# Patient Record
Sex: MALE | Race: WHITE | NOT HISPANIC OR LATINO | Employment: OTHER | URBAN - METROPOLITAN AREA
[De-identification: names, ages, dates, MRNs, and addresses within clinical notes are randomized per-mention and may not be internally consistent; named-entity substitution may affect disease eponyms.]

---

## 2017-06-28 ENCOUNTER — TELEPHONE (OUTPATIENT)
Dept: ONCOLOGY | Facility: CLINIC | Age: 72
End: 2017-06-28

## 2017-06-28 NOTE — TELEPHONE ENCOUNTER
Spoke with  Cruz to confirm loss of appetite.  Per Dr. Berman, patient is scheduled for yearly visit and since this is a new symptom since the last visit 12/2016.  Patient will need to be seen and assessed.  Okay to see him or Priscila.  Patient transferred to get appointment scheduled.

## 2017-06-28 NOTE — TELEPHONE ENCOUNTER
----- Message from Ramona Aguiar MA sent at 6/28/2017  9:47 AM EDT -----  Regarding: Cullen- lack of appetite, megace  Contact: [phone]  Pt wife called and states pt is doing well aside from his lack of appetite. She is wondering if Dr Berman could call in some Megace or something to give Francesco an appetite?  Family uses [facility]

## 2017-12-13 ENCOUNTER — APPOINTMENT (OUTPATIENT)
Dept: LAB | Facility: HOSPITAL | Age: 72
End: 2017-12-13

## 2017-12-13 ENCOUNTER — OFFICE VISIT (OUTPATIENT)
Dept: ONCOLOGY | Facility: CLINIC | Age: 72
End: 2017-12-13

## 2017-12-13 VITALS
TEMPERATURE: 98 F | SYSTOLIC BLOOD PRESSURE: 164 MMHG | HEART RATE: 90 BPM | DIASTOLIC BLOOD PRESSURE: 88 MMHG | WEIGHT: 154 LBS | HEIGHT: 67 IN | RESPIRATION RATE: 18 BRPM | BODY MASS INDEX: 24.17 KG/M2

## 2017-12-13 DIAGNOSIS — C15.5 MALIGNANT NEOPLASM OF LOWER THIRD OF ESOPHAGUS (HCC): Primary | ICD-10-CM

## 2017-12-13 LAB
ALBUMIN SERPL-MCNC: 4.2 G/DL (ref 3.2–4.8)
ALBUMIN/GLOB SERPL: 1.4 G/DL (ref 1.5–2.5)
ALP SERPL-CCNC: 64 U/L (ref 25–100)
ALT SERPL W P-5'-P-CCNC: 14 U/L (ref 7–40)
ANION GAP SERPL CALCULATED.3IONS-SCNC: 5 MMOL/L (ref 3–11)
AST SERPL-CCNC: 15 U/L (ref 0–33)
BILIRUB SERPL-MCNC: 0.5 MG/DL (ref 0.3–1.2)
BUN BLD-MCNC: 12 MG/DL (ref 9–23)
BUN/CREAT SERPL: 12 (ref 7–25)
CALCIUM SPEC-SCNC: 9.2 MG/DL (ref 8.7–10.4)
CHLORIDE SERPL-SCNC: 102 MMOL/L (ref 99–109)
CO2 SERPL-SCNC: 29 MMOL/L (ref 20–31)
CREAT BLD-MCNC: 1 MG/DL (ref 0.6–1.3)
ERYTHROCYTE [DISTWIDTH] IN BLOOD BY AUTOMATED COUNT: 14.8 % (ref 11.3–14.5)
GFR SERPL CREATININE-BSD FRML MDRD: 73 ML/MIN/1.73
GLOBULIN UR ELPH-MCNC: 3.1 GM/DL
GLUCOSE BLD-MCNC: 168 MG/DL (ref 70–100)
HCT VFR BLD AUTO: 49.2 % (ref 38.9–50.9)
HGB BLD-MCNC: 16.5 G/DL (ref 13.1–17.5)
LYMPHOCYTES # BLD AUTO: 2 10*3/MM3 (ref 0.6–4.8)
LYMPHOCYTES NFR BLD AUTO: 21 % (ref 24–44)
MCH RBC QN AUTO: 29.9 PG (ref 27–31)
MCHC RBC AUTO-ENTMCNC: 33.5 G/DL (ref 32–36)
MCV RBC AUTO: 89.1 FL (ref 80–99)
MONOCYTES # BLD AUTO: 0.6 10*3/MM3 (ref 0–1)
MONOCYTES NFR BLD AUTO: 6.5 % (ref 0–12)
NEUTROPHILS # BLD AUTO: 7 10*3/MM3 (ref 1.5–8.3)
NEUTROPHILS NFR BLD AUTO: 72.5 % (ref 41–71)
PLATELET # BLD AUTO: 246 10*3/MM3 (ref 150–450)
PMV BLD AUTO: 7.2 FL (ref 6–12)
POTASSIUM BLD-SCNC: 4.7 MMOL/L (ref 3.5–5.5)
PROT SERPL-MCNC: 7.3 G/DL (ref 5.7–8.2)
RBC # BLD AUTO: 5.52 10*6/MM3 (ref 4.2–5.76)
SODIUM BLD-SCNC: 136 MMOL/L (ref 132–146)
WBC NRBC COR # BLD: 9.7 10*3/MM3 (ref 3.5–10.8)

## 2017-12-13 PROCEDURE — 99214 OFFICE O/P EST MOD 30 MIN: CPT | Performed by: NURSE PRACTITIONER

## 2017-12-13 PROCEDURE — 36415 COLL VENOUS BLD VENIPUNCTURE: CPT | Performed by: NURSE PRACTITIONER

## 2017-12-13 PROCEDURE — 80053 COMPREHEN METABOLIC PANEL: CPT | Performed by: NURSE PRACTITIONER

## 2017-12-13 PROCEDURE — 85025 COMPLETE CBC W/AUTO DIFF WBC: CPT | Performed by: NURSE PRACTITIONER

## 2017-12-13 RX ORDER — ALBUTEROL SULFATE 2.5 MG/3ML
SOLUTION RESPIRATORY (INHALATION)
Refills: 0 | COMMUNITY
Start: 2017-11-29

## 2017-12-13 RX ORDER — HYDROCODONE BITARTRATE AND ACETAMINOPHEN 5; 325 MG/1; MG/1
TABLET ORAL
Refills: 0 | COMMUNITY
Start: 2017-11-29 | End: 2019-04-30

## 2017-12-13 NOTE — PROGRESS NOTES
"      PROBLEM LIST:  1. Adenocarcinoma of the distal esophagus.   a) Clinical stage T3N1M0, before neoadjuvant therapy.   b) Neoadjuvant chemoradiation.   c) Resection with stage [id number], stage [place]ic, resection 03/31/2008.       CHIEF COMPLAINT: Follow-up about esophageal cancer     Subjective     HISTORY OF PRESENT ILLNESS:   Mr. Arroyo is here for follow up evaluation of esophageal cancer. He complains of increasing mid epigastric abdominal pain over the past few months. He takes Nexium daily and Mylanta prn with minimal relief in symptoms.  He denies any unintentional weight loss, difficulty or painful swallowing. He maintains his usual daily activities.     Past Medical History, Past Surgical History, Social History, Family History have been reviewed and are without significant changes except as mentioned.    Review of Systems   A comprehensive 14 point review of systems was performed and was negative except as mentioned.    Medications:  The current medication list was reviewed in the EMR    ALLERGIES:  No Known Allergies    Objective      /88  Pulse 90  Temp 98 °F (36.7 °C)  Resp 18  Ht 170.2 cm (67\")  Wt 69.9 kg (154 lb)  BMI 24.12 kg/m2         General: well appearing, in no acute distress   HEENT: sclera anicteric, oropharynx clear  Lymphatics: no cervical, supraclavicular, or axillary adenopathy  Cardiovascular: regular rate and rhythm, no murmurs  Lungs: clear to auscultation bilaterally  Abdomen: soft, nontender, nondistended.  No palpable masses or organomegaly  Extremeties: no lower extremity edema, cords or calf tenderness  Skin: no rashes, lesions, bruising, or petechiae    RECENT LABS:  Hematology WBC   Date Value Ref Range Status   12/14/2015 8.69 3.50 - 10.80 K/mcL Final     Hemoglobin   Date Value Ref Range Status   12/14/2015 16.3 13.1 - 17.5 g/dL Final     Hematocrit   Date Value Ref Range Status   12/14/2015 48.8 38.9 - 50.9 % Final     MCV   Date Value Ref Range Status   12/14/2015 " 89.9 80.0 - 99.0 fL Final     Platelets   Date Value Ref Range Status   12/14/2015 216 150 - 450 K/mcL Final     Neutrophils Absolute   Date Value Ref Range Status   12/14/2015 5.81 1.50 - 8.30 K/mcL Final     Lymphocytes Absolute   Date Value Ref Range Status   12/14/2015 2.02 0.60 - 4.80 K/mcL Final     Monocytes Absolute   Date Value Ref Range Status   12/14/2015 0.66 0.00 - 1.00 K/mcL Final     Eosinophils Absolute   Date Value Ref Range Status   12/14/2015 0.13 0.10 - 0.30 K/mcL Final     Basophils Absolute   Date Value Ref Range Status   12/14/2015 0.05 0.00 - 0.20 K/mcL Final       Glucose   Date Value Ref Range Status   12/14/2015 150 (H) 70 - 100 mg/dL Final     Sodium   Date Value Ref Range Status   12/14/2015 141 132 - 146 mmol/L Final     Potassium   Date Value Ref Range Status   12/14/2015 4.6 3.5 - 5.5 mmol/L Final     CO2   Date Value Ref Range Status   12/14/2015 31 20 - 31 mmol/L Final     Chloride   Date Value Ref Range Status   12/14/2015 106 99 - 109 mmol/L Final     Anion Gap   Date Value Ref Range Status   12/14/2015 4 3 - 11 mmol/L Final     Creatinine   Date Value Ref Range Status   12/14/2015 1.0 0.6 - 1.3 mg/dL Final     BUN   Date Value Ref Range Status   12/14/2015 9 9 - 23 mg/dL Final     Calcium   Date Value Ref Range Status   12/14/2015 9.5 8.7 - 10.4 mg/dL Final     Alkaline Phosphatase   Date Value Ref Range Status   12/14/2015 93 25 - 100 Units/L Final     Total Protein   Date Value Ref Range Status   12/14/2015 7.0 5.7 - 8.2 g/dL Final     ALT (SGPT)   Date Value Ref Range Status   12/14/2015 17 7 - 40 Units/L Final     AST (SGOT)   Date Value Ref Range Status   12/14/2015 20 0 - 33 Units/L Final     Total Bilirubin   Date Value Ref Range Status   12/14/2015 0.5 0.3 - 1.2 mg/dL Final     Albumin   Date Value Ref Range Status   12/14/2015 4.1 3.2 - 4.8 g/dL Final       No results found for: LDH, URICACID       Assessment/Plan   IMPRESSION:   1. Esophageal cancer. He had been doing  well following the chemoradiation and resection. He recently complains of increasing abdominal pain after eating. We will obtain a ct scan of the abdomen to evaluate. If that is normal then we will look at repeat EGD.       PLAN:   1. CBC and CMP today. I will contact him with any adverse findings.  2. CT scan of abdomen with contrast within days.   3. We will see him back in 3months  for follow-up evaluation. He has been instructed to contact us in the interm if any new symptoms arise.                  NIMCO Rowell  [facility] Hematology and Oncology    12/13/2017          CC:

## 2017-12-17 ENCOUNTER — HOSPITAL ENCOUNTER (OUTPATIENT)
Dept: CT IMAGING | Facility: HOSPITAL | Age: 72
Discharge: HOME | End: 2017-12-17
Admitting: NURSE PRACTITIONER

## 2017-12-17 DIAGNOSIS — C15.5 MALIGNANT NEOPLASM OF LOWER THIRD OF ESOPHAGUS (HCC): ICD-10-CM

## 2017-12-17 PROCEDURE — 74160 CT ABDOMEN W/CONTRAST: CPT

## 2017-12-17 PROCEDURE — 0 IOPAMIDOL 61 % SOLUTION: Performed by: NURSE PRACTITIONER

## 2017-12-17 RX ADMIN — IOPAMIDOL 85 ML: 612 INJECTION, SOLUTION INTRAVENOUS at 10:30

## 2017-12-19 ENCOUNTER — TELEPHONE (OUTPATIENT)
Dept: ONCOLOGY | Facility: CLINIC | Age: 72
End: 2017-12-19

## 2017-12-19 NOTE — TELEPHONE ENCOUNTER
Called and left message on patient VM, informed him that the CT Scan results came back with no cancer and the only thing seen was his surgery.

## 2017-12-19 NOTE — TELEPHONE ENCOUNTER
----- Message from Eileen Ledezma sent at 12/19/2017 10:28 AM EST -----  Regarding: DON- SCAN RESULTS   Contact: [phone]  PATIENT IS WANTING RESULTS FROM CT SCAN HE HAD.

## 2018-03-19 ENCOUNTER — OFFICE VISIT (OUTPATIENT)
Dept: ONCOLOGY | Facility: CLINIC | Age: 73
End: 2018-03-19

## 2018-03-19 VITALS
TEMPERATURE: 97.6 F | HEIGHT: 67 IN | WEIGHT: 154 LBS | DIASTOLIC BLOOD PRESSURE: 96 MMHG | HEART RATE: 94 BPM | BODY MASS INDEX: 24.17 KG/M2 | RESPIRATION RATE: 18 BRPM | SYSTOLIC BLOOD PRESSURE: 140 MMHG

## 2018-03-19 DIAGNOSIS — C15.5 MALIGNANT NEOPLASM OF LOWER THIRD OF ESOPHAGUS (HCC): Primary | ICD-10-CM

## 2018-03-19 PROCEDURE — 99213 OFFICE O/P EST LOW 20 MIN: CPT | Performed by: NURSE PRACTITIONER

## 2018-03-19 NOTE — PROGRESS NOTES
"      PROBLEM LIST:  1. Adenocarcinoma of the distal esophagus.   a) Clinical stage T3N1M0, before neoadjuvant therapy.   b) Neoadjuvant chemoradiation.   c) Resection with stage [id number], stage Bengali, resection 03/31/2008.       CHIEF COMPLAINT: Follow-up about esophageal cancer     Subjective     HISTORY OF PRESENT ILLNESS:   Mr. Arroyo is here for follow up evaluation of esophageal cancer. He reports his mid epigastric abdominal pain has improved immensely since his last visit. He denies any unintentional weight loss, difficulty or painful swallowing. He maintains his usual daily activities. He complains of recent bronchitis with a productive cough but no fevers, chills or dyspnea. He completed an antibiotic approximately 2 weeks ago.     Past Medical History, Past Surgical History, Social History, Family History have been reviewed and are without significant changes except as mentioned.    Review of Systems   A comprehensive 14 point review of systems was performed and was negative except as mentioned.    Medications:  The current medication list was reviewed in the EMR    ALLERGIES:  No Known Allergies    Objective      /96   Pulse 94   Temp 97.6 °F (36.4 °C)   Resp 18   Ht 170.2 cm (67\")   Wt 69.9 kg (154 lb)   BMI 24.12 kg/m²          General: well appearing, in no acute distress   HEENT: sclera anicteric, oropharynx clear  Lymphatics: no cervical, supraclavicular, or axillary adenopathy  Cardiovascular: regular rate and rhythm, no murmurs  Lungs: scattered expiratory wheezes, no rales or rhonchi      Abdomen: soft, nontender, nondistended.  No palpable masses or organomegaly  Extremeties: no lower extremity edema, cords or calf tenderness  Skin: no rashes, lesions, bruising, or petechiae    RECENT LABS:  Hematology WBC   Date Value Ref Range Status   12/13/2017 9.70 3.50 - 10.80 10*3/mm3 Final     Hemoglobin   Date Value Ref Range Status   12/13/2017 16.5 13.1 - 17.5 g/dL Final     Hematocrit   Date " Value Ref Range Status   12/13/2017 49.2 38.9 - 50.9 % Final     MCV   Date Value Ref Range Status   12/13/2017 89.1 80.0 - 99.0 fL Final     RDW   Date Value Ref Range Status   12/13/2017 14.8 (H) 11.3 - 14.5 % Final     MPV   Date Value Ref Range Status   12/13/2017 7.2 6.0 - 12.0 fL Final     Platelets   Date Value Ref Range Status   12/13/2017 246 150 - 450 10*3/mm3 Final     Neutrophils, Absolute   Date Value Ref Range Status   12/13/2017 7.00 1.50 - 8.30 10*3/mm3 Final     Lymphocytes, Absolute   Date Value Ref Range Status   12/13/2017 2.00 0.60 - 4.80 10*3/mm3 Final     Monocytes, Absolute   Date Value Ref Range Status   12/13/2017 0.60 0.00 - 1.00 10*3/mm3 Final     Eosinophils Absolute   Date Value Ref Range Status   12/14/2015 0.13 0.10 - 0.30 K/mcL Final     Basophils Absolute   Date Value Ref Range Status   12/14/2015 0.05 0.00 - 0.20 K/mcL Final       Glucose   Date Value Ref Range Status   12/13/2017 168 (H) 70 - 100 mg/dL Final     Sodium   Date Value Ref Range Status   12/13/2017 136 132 - 146 mmol/L Final     Potassium   Date Value Ref Range Status   12/13/2017 4.7 3.5 - 5.5 mmol/L Final     CO2   Date Value Ref Range Status   12/13/2017 29.0 20.0 - 31.0 mmol/L Final     Chloride   Date Value Ref Range Status   12/13/2017 102 99 - 109 mmol/L Final     Anion Gap   Date Value Ref Range Status   12/13/2017 5.0 3.0 - 11.0 mmol/L Final     Creatinine   Date Value Ref Range Status   12/13/2017 1.00 0.60 - 1.30 mg/dL Final     BUN   Date Value Ref Range Status   12/13/2017 12 9 - 23 mg/dL Final     BUN/Creatinine Ratio   Date Value Ref Range Status   12/13/2017 12.0 7.0 - 25.0 Final     Calcium   Date Value Ref Range Status   12/13/2017 9.2 8.7 - 10.4 mg/dL Final     eGFR Non  Amer   Date Value Ref Range Status   12/13/2017 73 >60 mL/min/1.73 Final     Alkaline Phosphatase   Date Value Ref Range Status   12/13/2017 64 25 - 100 U/L Final     Total Protein   Date Value Ref Range Status   12/13/2017  "7.3 5.7 - 8.2 g/dL Final     ALT (SGPT)   Date Value Ref Range Status   12/13/2017 14 7 - 40 U/L Final     AST (SGOT)   Date Value Ref Range Status   12/13/2017 15 0 - 33 U/L Final     Total Bilirubin   Date Value Ref Range Status   12/13/2017 0.5 0.3 - 1.2 mg/dL Final     Albumin   Date Value Ref Range Status   12/13/2017 4.20 3.20 - 4.80 g/dL Final     Globulin   Date Value Ref Range Status   12/13/2017 3.1 gm/dL Final     A/G Ratio   Date Value Ref Range Status   12/13/2017 1.4 (L) 1.5 - 2.5 g/dL Final       No results found for: LDH, URICACID     CT Abdomen With Contrast [[id number]] Collected: 12/17/17 1337   Order Status: Completed Updated: 12/17/17 1341   Narrative:     EXAMINATION: CT ABDOMEN W CONTRAST - 12/17/2017     INDICATION:  C15.5-Malignant neoplasm of lower third of esophagus.  Abdominal pain. Follow-up exam.     TECHNIQUE: CT scan of the abdomen was performed with intravenous  contrast.     The radiation dose reduction device was turned on for each scan per the  ALARA (As Low as Reasonably Achievable) protocol.     COMPARISON: 11/29/2012.     FINDINGS: The most superior images demonstrate postinflammatory changes  particularly in the right lower lobe. There are postoperative changes  related to esophagectomy and gastric \"pull-through\" procedure. The  spleen is normal. There is mild diffuse fatty infiltration of the liver  but there is no focal abnormality. There is no ascites, aneurysm or  retroperitoneal lymphadenopathy. There is a benign simple cyst in the  right kidney. The left kidney is ptotic.      Impression:     There are no acute findings and there has been no change  since 11/29/2012. There are postoperative changes related to esophageal  surgery and there is mild fatty infiltration of the liver without focal  abnormality.     DICTATED:     12/17/2017  EDITED:          12/17/2017        This report was finalized on 12/17/2017 1:41 PM by Dr. Ravindra Carcamo MD.            Assessment/Plan "   IMPRESSION:   1. Esophageal cancer. He had been doing well following the chemoradiation and resection.    2. Wheezing and cough. Encourage use of Mucinex BID and albuterol inhaler prn cough and wheezing.       PLAN:   1. We will see him back in 1 year for follow-up evaluation. He has been instructed to contact us in the interm if any new symptoms arise.                  Arabella Mejias, APRJACE  [facility] Hematology and Oncology    3/19/2018          CC:

## 2018-10-02 ENCOUNTER — TRANSCRIBE ORDERS (OUTPATIENT)
Dept: ADMINISTRATIVE | Facility: HOSPITAL | Age: 73
End: 2018-10-02

## 2018-10-02 DIAGNOSIS — R31.29 MICROSCOPIC HEMATURIA: Primary | ICD-10-CM

## 2018-10-04 ENCOUNTER — TRANSCRIBE ORDERS (OUTPATIENT)
Dept: ADMINISTRATIVE | Facility: HOSPITAL | Age: 73
End: 2018-10-04

## 2018-10-04 ENCOUNTER — HOSPITAL ENCOUNTER (OUTPATIENT)
Dept: ULTRASOUND IMAGING | Facility: HOSPITAL | Age: 73
Discharge: HOME | End: 2018-10-04
Admitting: UROLOGY

## 2018-10-04 ENCOUNTER — HOSPITAL ENCOUNTER (OUTPATIENT)
Dept: GENERAL RADIOLOGY | Facility: HOSPITAL | Age: 73
Discharge: HOME | End: 2018-10-04

## 2018-10-04 DIAGNOSIS — R31.29 MICROSCOPIC HEMATURIA: ICD-10-CM

## 2018-10-04 DIAGNOSIS — R31.29 MICROSCOPIC HEMATURIA: Primary | ICD-10-CM

## 2018-10-04 PROCEDURE — 76775 US EXAM ABDO BACK WALL LIM: CPT

## 2018-10-04 PROCEDURE — 74018 RADEX ABDOMEN 1 VIEW: CPT

## 2019-03-08 ENCOUNTER — OFFICE VISIT (OUTPATIENT)
Dept: ONCOLOGY | Facility: CLINIC | Age: 74
End: 2019-03-08

## 2019-03-08 VITALS
BODY MASS INDEX: 24.17 KG/M2 | HEART RATE: 106 BPM | RESPIRATION RATE: 18 BRPM | WEIGHT: 154 LBS | DIASTOLIC BLOOD PRESSURE: 87 MMHG | OXYGEN SATURATION: 97 % | TEMPERATURE: 97.6 F | SYSTOLIC BLOOD PRESSURE: 143 MMHG | HEIGHT: 67 IN

## 2019-03-08 DIAGNOSIS — C15.5 MALIGNANT NEOPLASM OF LOWER THIRD OF ESOPHAGUS (HCC): Primary | ICD-10-CM

## 2019-03-08 PROCEDURE — 99214 OFFICE O/P EST MOD 30 MIN: CPT | Performed by: INTERNAL MEDICINE

## 2019-03-08 NOTE — PROGRESS NOTES
"      PROBLEM LIST:  1. Adenocarcinoma of the distal esophagus.   a) Clinical stage T3N1M0, before neoadjuvant therapy.   b) Neoadjuvant chemoradiation.   c) Resection with stage [id number], stage [place], resection 03/31/2008.       CHIEF COMPLAINT: Follow-up about esophageal cancer     Subjective     HISTORY OF PRESENT ILLNESS:   Mr. Arroyo is here for follow up evaluation of esophageal cancer.  He is 10 years out from his diagnosis of esophageal cancer.  He is having issues with abdominal distention and diarrhea.  This is been an ongoing issue and is concerned.  He had a CAT scan over a year ago that was normal.  Denies any major weight loss.      Past Medical History, Past Surgical History, Social History, Family History have been reviewed and are without significant changes except as mentioned.    Review of Systems   Constitutional: Negative.    HENT: Negative.    Eyes: Negative.    Respiratory: Negative.    Cardiovascular: Negative.    Gastrointestinal: Positive for abdominal pain and diarrhea.   Endocrine: Negative.    Genitourinary: Negative.    Musculoskeletal: Negative.    Skin: Negative.    Allergic/Immunologic: Negative.    Neurological: Negative.    Hematological: Negative.    Psychiatric/Behavioral: Negative.       A comprehensive 14 point review of systems was performed and was negative except as mentioned.    Medications:  The current medication list was reviewed in the EMR    ALLERGIES:  No Known Allergies    Objective      /87 Comment: RUE  Pulse 106   Temp 97.6 °F (36.4 °C) (Temporal)   Resp 18   Ht 170.2 cm (67\")   Wt 69.9 kg (154 lb)   SpO2 97% Comment: RA  BMI 24.12 kg/m²          General: well appearing, in no acute distress   HEENT: sclera anicteric, oropharynx clear  Lymphatics: no cervical, supraclavicular, or axillary adenopathy  Cardiovascular: regular rate and rhythm, no murmurs  Lungs: scattered expiratory wheezes, no rales or rhonchi      Abdomen: soft, nontender, nondistended.  " No palpable masses or organomegaly  Extremeties: no lower extremity edema, cords or calf tenderness  Skin: no rashes, lesions, bruising, or petechiae      Assessment/Plan   IMPRESSION:     1.  Esophageal cancer.  His risk of recurrence at this point is fairly low.  I suspect the symptoms that he is having is likely related to his esophagectomy.  I recommended he increase his fiber intake with Metamucil to see if that helps with the diarrhea issues.  He is having dumping syndrome.  Metamucil can slow the transition time.  If this does not improve then we can get a CAT scan to see if there is any cause.  He may require an EGD to make sure we are not missing an obvious sequela of his surgery.  I reviewed his CAT scan from December 2017.  I do not see any obvious cause of his issues.  The other possibility is that he has long-standing diabetes and he may have chronic diarrhea related to that.    I spent a total of 25 minutes in direct patient care, greater than 20 minutes (greater than 50%) were spent in coordination of care, and counseling the patient regarding esophageal cancer. Answered any questions patient had with medication and plan.      Greg Sykes MD  [facility] Hematology and Oncology    3/8/2019          CC:

## 2019-04-30 ENCOUNTER — APPOINTMENT (OUTPATIENT)
Dept: GENERAL RADIOLOGY | Facility: HOSPITAL | Age: 74
End: 2019-04-30

## 2019-04-30 ENCOUNTER — HOSPITAL ENCOUNTER (INPATIENT)
Facility: HOSPITAL | Age: 74
LOS: 9 days | Discharge: HOME | End: 2019-05-09
Attending: EMERGENCY MEDICINE | Admitting: THORACIC SURGERY (CARDIOTHORACIC VASCULAR SURGERY)

## 2019-04-30 DIAGNOSIS — R60.0 PEDAL EDEMA: ICD-10-CM

## 2019-04-30 DIAGNOSIS — Z74.09 IMPAIRED FUNCTIONAL MOBILITY, BALANCE, GAIT, AND ENDURANCE: ICD-10-CM

## 2019-04-30 DIAGNOSIS — I48.91 ATRIAL FIBRILLATION WITH RAPID VENTRICULAR RESPONSE (HCC): ICD-10-CM

## 2019-04-30 DIAGNOSIS — J44.9 CHRONIC OBSTRUCTIVE PULMONARY DISEASE, UNSPECIFIED COPD TYPE (HCC): ICD-10-CM

## 2019-04-30 DIAGNOSIS — I25.119 CORONARY ARTERY DISEASE INVOLVING NATIVE HEART WITH ANGINA PECTORIS, UNSPECIFIED VESSEL OR LESION TYPE (HCC): ICD-10-CM

## 2019-04-30 DIAGNOSIS — I50.23 ACUTE ON CHRONIC SYSTOLIC CHF (CONGESTIVE HEART FAILURE) (HCC): ICD-10-CM

## 2019-04-30 DIAGNOSIS — I51.9 LEFT VENTRICULAR DYSFUNCTION: ICD-10-CM

## 2019-04-30 DIAGNOSIS — I31.39 PERICARDIAL EFFUSION (NONINFLAMMATORY): Primary | ICD-10-CM

## 2019-04-30 DIAGNOSIS — I30.9 ACUTE PERICARDIAL EFFUSION: ICD-10-CM

## 2019-04-30 DIAGNOSIS — I48.91 ATRIAL FIBRILLATION WITH RVR (HCC): ICD-10-CM

## 2019-04-30 PROBLEM — R79.89 ELEVATED LACTIC ACID LEVEL: Status: ACTIVE | Noted: 2019-04-30

## 2019-04-30 PROBLEM — E87.1 HYPONATREMIA: Status: ACTIVE | Noted: 2019-04-30

## 2019-04-30 PROBLEM — E11.9 TYPE 2 DIABETES MELLITUS: Status: ACTIVE | Noted: 2019-04-30

## 2019-04-30 PROBLEM — J90 BILATERAL PLEURAL EFFUSION: Status: ACTIVE | Noted: 2019-04-30

## 2019-04-30 PROBLEM — K21.9 GERD WITHOUT ESOPHAGITIS: Status: ACTIVE | Noted: 2019-04-30

## 2019-04-30 PROBLEM — Z85.01 HISTORY OF ESOPHAGEAL CANCER: Status: ACTIVE | Noted: 2019-04-30

## 2019-04-30 PROBLEM — I50.9 ACUTE CHF (HCC): Status: ACTIVE | Noted: 2019-04-30

## 2019-04-30 LAB
ALBUMIN SERPL-MCNC: 3.6 G/DL (ref 3.5–5.2)
ALBUMIN/GLOB SERPL: 0.9 G/DL
ALP SERPL-CCNC: 98 U/L (ref 39–117)
ALT SERPL W P-5'-P-CCNC: 12 U/L (ref 1–41)
ANION GAP SERPL CALCULATED.3IONS-SCNC: 17 MMOL/L
APTT PPP: 30.6 SECONDS (ref 85–120)
AST SERPL-CCNC: 16 U/L (ref 1–40)
BASOPHILS # BLD AUTO: 0.03 10*3/MM3 (ref 0–0.2)
BASOPHILS # BLD AUTO: 0.04 10*3/MM3 (ref 0–0.2)
BASOPHILS NFR BLD AUTO: 0.3 % (ref 0–1.5)
BASOPHILS NFR BLD AUTO: 0.4 % (ref 0–1.5)
BILIRUB SERPL-MCNC: 0.6 MG/DL (ref 0.2–1.2)
BILIRUB UR QL STRIP: NEGATIVE
BUN BLD-MCNC: 13 MG/DL (ref 8–23)
BUN/CREAT SERPL: 15.3 (ref 7–25)
CALCIUM SPEC-SCNC: 9.3 MG/DL (ref 8.6–10.5)
CHLORIDE SERPL-SCNC: 89 MMOL/L (ref 98–107)
CLARITY UR: CLEAR
CO2 SERPL-SCNC: 27 MMOL/L (ref 22–29)
COLOR UR: YELLOW
CREAT BLD-MCNC: 0.85 MG/DL (ref 0.76–1.27)
D-LACTATE SERPL-SCNC: 1.6 MMOL/L (ref 0.5–2)
D-LACTATE SERPL-SCNC: 3.1 MMOL/L (ref 0.5–2)
DEPRECATED RDW RBC AUTO: 49.7 FL (ref 37–54)
DEPRECATED RDW RBC AUTO: 50.3 FL (ref 37–54)
EOSINOPHIL # BLD AUTO: 0.03 10*3/MM3 (ref 0–0.4)
EOSINOPHIL # BLD AUTO: 0.03 10*3/MM3 (ref 0–0.4)
EOSINOPHIL NFR BLD AUTO: 0.3 % (ref 0.3–6.2)
EOSINOPHIL NFR BLD AUTO: 0.3 % (ref 0.3–6.2)
ERYTHROCYTE [DISTWIDTH] IN BLOOD BY AUTOMATED COUNT: 16.9 % (ref 12.3–15.4)
ERYTHROCYTE [DISTWIDTH] IN BLOOD BY AUTOMATED COUNT: 17 % (ref 12.3–15.4)
GFR SERPL CREATININE-BSD FRML MDRD: 88 ML/MIN/1.73
GLOBULIN UR ELPH-MCNC: 4.1 GM/DL
GLUCOSE BLD-MCNC: 137 MG/DL (ref 65–99)
GLUCOSE BLDC GLUCOMTR-MCNC: 145 MG/DL (ref 70–130)
GLUCOSE UR STRIP-MCNC: NEGATIVE MG/DL
HCT VFR BLD AUTO: 36.8 % (ref 37.5–51)
HCT VFR BLD AUTO: 39.8 % (ref 37.5–51)
HGB BLD-MCNC: 12.2 G/DL (ref 13–17.7)
HGB BLD-MCNC: 13.3 G/DL (ref 13–17.7)
HGB UR QL STRIP.AUTO: NEGATIVE
HOLD SPECIMEN: NORMAL
IMM GRANULOCYTES # BLD AUTO: 0.06 10*3/MM3 (ref 0–0.05)
IMM GRANULOCYTES # BLD AUTO: 0.06 10*3/MM3 (ref 0–0.05)
IMM GRANULOCYTES NFR BLD AUTO: 0.6 % (ref 0–0.5)
IMM GRANULOCYTES NFR BLD AUTO: 0.6 % (ref 0–0.5)
INR PPP: 1.17 (ref 0.85–1.16)
KETONES UR QL STRIP: NEGATIVE
LEUKOCYTE ESTERASE UR QL STRIP.AUTO: NEGATIVE
LYMPHOCYTES # BLD AUTO: 1.16 10*3/MM3 (ref 0.7–3.1)
LYMPHOCYTES # BLD AUTO: 1.29 10*3/MM3 (ref 0.7–3.1)
LYMPHOCYTES NFR BLD AUTO: 10.8 % (ref 19.6–45.3)
LYMPHOCYTES NFR BLD AUTO: 13.1 % (ref 19.6–45.3)
MCH RBC QN AUTO: 26.5 PG (ref 26.6–33)
MCH RBC QN AUTO: 26.9 PG (ref 26.6–33)
MCHC RBC AUTO-ENTMCNC: 33.2 G/DL (ref 31.5–35.7)
MCHC RBC AUTO-ENTMCNC: 33.4 G/DL (ref 31.5–35.7)
MCV RBC AUTO: 80 FL (ref 79–97)
MCV RBC AUTO: 80.6 FL (ref 79–97)
MONOCYTES # BLD AUTO: 1 10*3/MM3 (ref 0.1–0.9)
MONOCYTES # BLD AUTO: 1.07 10*3/MM3 (ref 0.1–0.9)
MONOCYTES NFR BLD AUTO: 10.9 % (ref 5–12)
MONOCYTES NFR BLD AUTO: 9.3 % (ref 5–12)
NEUTROPHILS # BLD AUTO: 7.38 10*3/MM3 (ref 1.7–7)
NEUTROPHILS # BLD AUTO: 8.42 10*3/MM3 (ref 1.7–7)
NEUTROPHILS NFR BLD AUTO: 74.8 % (ref 42.7–76)
NEUTROPHILS NFR BLD AUTO: 78.6 % (ref 42.7–76)
NITRITE UR QL STRIP: NEGATIVE
NT-PROBNP SERPL-MCNC: 1689 PG/ML (ref 5–900)
PH UR STRIP.AUTO: 7 [PH] (ref 5–8)
PLATELET # BLD AUTO: 312 10*3/MM3 (ref 140–450)
PLATELET # BLD AUTO: 324 10*3/MM3 (ref 140–450)
PMV BLD AUTO: 8.6 FL (ref 6–12)
PMV BLD AUTO: 9.1 FL (ref 6–12)
POTASSIUM BLD-SCNC: 3.9 MMOL/L (ref 3.5–5.2)
PROCALCITONIN SERPL-MCNC: 0.05 NG/ML (ref 0.1–0.25)
PROT SERPL-MCNC: 7.7 G/DL (ref 6–8.5)
PROT UR QL STRIP: NEGATIVE
PROTHROMBIN TIME: 14.4 SECONDS (ref 11.2–14.3)
RBC # BLD AUTO: 4.6 10*6/MM3 (ref 4.14–5.8)
RBC # BLD AUTO: 4.94 10*6/MM3 (ref 4.14–5.8)
SODIUM BLD-SCNC: 133 MMOL/L (ref 136–145)
SP GR UR STRIP: 1.01 (ref 1–1.03)
TROPONIN T SERPL-MCNC: <0.01 NG/ML (ref 0–0.03)
TSH SERPL DL<=0.05 MIU/L-ACNC: 1.38 MIU/ML (ref 0.27–4.2)
UFH PPP CHRO-ACNC: 0.1 IU/ML (ref 0.3–0.7)
UROBILINOGEN UR QL STRIP: NORMAL
WBC NRBC COR # BLD: 10.71 10*3/MM3 (ref 3.4–10.8)
WBC NRBC COR # BLD: 9.86 10*3/MM3 (ref 3.4–10.8)
WHOLE BLOOD HOLD SPECIMEN: NORMAL
WHOLE BLOOD HOLD SPECIMEN: NORMAL

## 2019-04-30 PROCEDURE — 85025 COMPLETE CBC W/AUTO DIFF WBC: CPT | Performed by: EMERGENCY MEDICINE

## 2019-04-30 PROCEDURE — 85025 COMPLETE CBC W/AUTO DIFF WBC: CPT | Performed by: PHYSICIAN ASSISTANT

## 2019-04-30 PROCEDURE — 25010000002 HEPARIN (PORCINE) IN NACL 25000-0.45 UT/250ML-% SOLUTION: Performed by: PHYSICIAN ASSISTANT

## 2019-04-30 PROCEDURE — 25010000002 HEPARIN (PORCINE) PER 1000 UNITS: Performed by: PHYSICIAN ASSISTANT

## 2019-04-30 PROCEDURE — 84145 PROCALCITONIN (PCT): CPT | Performed by: PHYSICIAN ASSISTANT

## 2019-04-30 PROCEDURE — 99223 1ST HOSP IP/OBS HIGH 75: CPT | Performed by: FAMILY MEDICINE

## 2019-04-30 PROCEDURE — 84484 ASSAY OF TROPONIN QUANT: CPT | Performed by: EMERGENCY MEDICINE

## 2019-04-30 PROCEDURE — 81003 URINALYSIS AUTO W/O SCOPE: CPT | Performed by: PHYSICIAN ASSISTANT

## 2019-04-30 PROCEDURE — 84443 ASSAY THYROID STIM HORMONE: CPT | Performed by: PHYSICIAN ASSISTANT

## 2019-04-30 PROCEDURE — 99285 EMERGENCY DEPT VISIT HI MDM: CPT

## 2019-04-30 PROCEDURE — 83605 ASSAY OF LACTIC ACID: CPT | Performed by: PHYSICIAN ASSISTANT

## 2019-04-30 PROCEDURE — 80053 COMPREHEN METABOLIC PANEL: CPT | Performed by: EMERGENCY MEDICINE

## 2019-04-30 PROCEDURE — 63710000001 INSULIN LISPRO (HUMAN) PER 5 UNITS: Performed by: NURSE PRACTITIONER

## 2019-04-30 PROCEDURE — 85610 PROTHROMBIN TIME: CPT | Performed by: PHYSICIAN ASSISTANT

## 2019-04-30 PROCEDURE — 82962 GLUCOSE BLOOD TEST: CPT

## 2019-04-30 PROCEDURE — 83880 ASSAY OF NATRIURETIC PEPTIDE: CPT | Performed by: EMERGENCY MEDICINE

## 2019-04-30 PROCEDURE — 85730 THROMBOPLASTIN TIME PARTIAL: CPT | Performed by: PHYSICIAN ASSISTANT

## 2019-04-30 PROCEDURE — 71045 X-RAY EXAM CHEST 1 VIEW: CPT

## 2019-04-30 PROCEDURE — 25010000002 FUROSEMIDE PER 20 MG: Performed by: PHYSICIAN ASSISTANT

## 2019-04-30 PROCEDURE — 93005 ELECTROCARDIOGRAM TRACING: CPT | Performed by: EMERGENCY MEDICINE

## 2019-04-30 PROCEDURE — 85520 HEPARIN ASSAY: CPT | Performed by: PHYSICIAN ASSISTANT

## 2019-04-30 RX ORDER — IPRATROPIUM BROMIDE AND ALBUTEROL SULFATE 2.5; .5 MG/3ML; MG/3ML
3 SOLUTION RESPIRATORY (INHALATION) EVERY 4 HOURS PRN
Status: DISCONTINUED | OUTPATIENT
Start: 2019-04-30 | End: 2019-05-09 | Stop reason: HOSPADM

## 2019-04-30 RX ORDER — HEPARIN SODIUM 1000 [USP'U]/ML
60 INJECTION, SOLUTION INTRAVENOUS; SUBCUTANEOUS AS NEEDED
Status: DISCONTINUED | OUTPATIENT
Start: 2019-04-30 | End: 2019-04-30

## 2019-04-30 RX ORDER — FUROSEMIDE 10 MG/ML
20 INJECTION INTRAMUSCULAR; INTRAVENOUS ONCE
Status: COMPLETED | OUTPATIENT
Start: 2019-04-30 | End: 2019-04-30

## 2019-04-30 RX ORDER — HEPARIN SODIUM 1000 [USP'U]/ML
30 INJECTION, SOLUTION INTRAVENOUS; SUBCUTANEOUS AS NEEDED
Status: DISCONTINUED | OUTPATIENT
Start: 2019-04-30 | End: 2019-04-30

## 2019-04-30 RX ORDER — NICOTINE POLACRILEX 4 MG
15 LOZENGE BUCCAL
Status: DISCONTINUED | OUTPATIENT
Start: 2019-04-30 | End: 2019-05-07

## 2019-04-30 RX ORDER — SODIUM CHLORIDE 0.9 % (FLUSH) 0.9 %
3 SYRINGE (ML) INJECTION EVERY 12 HOURS SCHEDULED
Status: DISCONTINUED | OUTPATIENT
Start: 2019-04-30 | End: 2019-05-09 | Stop reason: HOSPADM

## 2019-04-30 RX ORDER — SODIUM CHLORIDE 0.9 % (FLUSH) 0.9 %
3-10 SYRINGE (ML) INJECTION AS NEEDED
Status: DISCONTINUED | OUTPATIENT
Start: 2019-04-30 | End: 2019-05-06

## 2019-04-30 RX ORDER — DOXYCYCLINE HYCLATE 100 MG/1
100 CAPSULE ORAL 2 TIMES DAILY
COMMUNITY
Start: 2019-04-20 | End: 2019-05-09 | Stop reason: HOSPADM

## 2019-04-30 RX ORDER — HYDROCODONE BITARTRATE AND ACETAMINOPHEN 5; 325 MG/1; MG/1
1 TABLET ORAL EVERY 8 HOURS PRN
COMMUNITY

## 2019-04-30 RX ORDER — DILTIAZEM HYDROCHLORIDE 5 MG/ML
10 INJECTION INTRAVENOUS ONCE
Status: COMPLETED | OUTPATIENT
Start: 2019-04-30 | End: 2019-04-30

## 2019-04-30 RX ORDER — SODIUM CHLORIDE 0.9 % (FLUSH) 0.9 %
10 SYRINGE (ML) INJECTION AS NEEDED
Status: DISCONTINUED | OUTPATIENT
Start: 2019-04-30 | End: 2019-05-06

## 2019-04-30 RX ORDER — DEXTROSE MONOHYDRATE 25 G/50ML
25 INJECTION, SOLUTION INTRAVENOUS
Status: DISCONTINUED | OUTPATIENT
Start: 2019-04-30 | End: 2019-05-07

## 2019-04-30 RX ORDER — FUROSEMIDE 40 MG/1
40 TABLET ORAL DAILY
COMMUNITY

## 2019-04-30 RX ORDER — DOXYCYCLINE 100 MG/1
100 CAPSULE ORAL EVERY 12 HOURS SCHEDULED
Status: COMPLETED | OUTPATIENT
Start: 2019-04-30 | End: 2019-05-01

## 2019-04-30 RX ORDER — FUROSEMIDE 10 MG/ML
20 INJECTION INTRAMUSCULAR; INTRAVENOUS ONCE
Status: COMPLETED | OUTPATIENT
Start: 2019-05-01 | End: 2019-05-01

## 2019-04-30 RX ORDER — GLYBURIDE-METFORMIN HYDROCHLORIDE 5; 500 MG/1; MG/1
1 TABLET ORAL 2 TIMES DAILY WITH MEALS
COMMUNITY
End: 2019-08-16

## 2019-04-30 RX ORDER — ASPIRIN 81 MG/1
81 TABLET ORAL DAILY
Status: DISCONTINUED | OUTPATIENT
Start: 2019-05-01 | End: 2019-05-09 | Stop reason: HOSPADM

## 2019-04-30 RX ORDER — ACETAMINOPHEN 325 MG/1
650 TABLET ORAL EVERY 6 HOURS PRN
Status: DISCONTINUED | OUTPATIENT
Start: 2019-04-30 | End: 2019-05-09 | Stop reason: HOSPADM

## 2019-04-30 RX ORDER — PANTOPRAZOLE SODIUM 40 MG/1
40 TABLET, DELAYED RELEASE ORAL EVERY MORNING
Status: DISCONTINUED | OUTPATIENT
Start: 2019-05-01 | End: 2019-05-09 | Stop reason: HOSPADM

## 2019-04-30 RX ORDER — NICOTINE 21 MG/24HR
1 PATCH, TRANSDERMAL 24 HOURS TRANSDERMAL
Status: DISCONTINUED | OUTPATIENT
Start: 2019-04-30 | End: 2019-05-09 | Stop reason: HOSPADM

## 2019-04-30 RX ORDER — HEPARIN SODIUM 1000 [USP'U]/ML
4000 INJECTION, SOLUTION INTRAVENOUS; SUBCUTANEOUS ONCE
Status: COMPLETED | OUTPATIENT
Start: 2019-04-30 | End: 2019-04-30

## 2019-04-30 RX ORDER — ASPIRIN 81 MG/1
81 TABLET ORAL DAILY
COMMUNITY

## 2019-04-30 RX ADMIN — DOXYCYCLINE 100 MG: 100 CAPSULE ORAL at 23:02

## 2019-04-30 RX ADMIN — HEPARIN SODIUM 12 UNITS/KG/HR: 10000 INJECTION, SOLUTION INTRAVENOUS at 17:42

## 2019-04-30 RX ADMIN — HEPARIN SODIUM 4000 UNITS: 1000 INJECTION, SOLUTION INTRAVENOUS; SUBCUTANEOUS at 17:42

## 2019-04-30 RX ADMIN — SODIUM CHLORIDE, PRESERVATIVE FREE 3 ML: 5 INJECTION INTRAVENOUS at 23:03

## 2019-04-30 RX ADMIN — FUROSEMIDE 20 MG: 10 INJECTION, SOLUTION INTRAMUSCULAR; INTRAVENOUS at 17:15

## 2019-04-30 RX ADMIN — DILTIAZEM HYDROCHLORIDE 10 MG: 5 INJECTION INTRAVENOUS at 17:20

## 2019-04-30 RX ADMIN — DILTIAZEM HYDROCHLORIDE 5 MG/HR: 5 INJECTION INTRAVENOUS at 19:03

## 2019-05-01 ENCOUNTER — APPOINTMENT (OUTPATIENT)
Dept: CARDIOLOGY | Facility: HOSPITAL | Age: 74
End: 2019-05-01

## 2019-05-01 LAB
ANION GAP SERPL CALCULATED.3IONS-SCNC: 14 MMOL/L
BASOPHILS # BLD AUTO: 0.04 10*3/MM3 (ref 0–0.2)
BASOPHILS NFR BLD AUTO: 0.5 % (ref 0–1.5)
BH CV ECHO MEAS - AO ROOT AREA (BSA CORRECTED): 1.8
BH CV ECHO MEAS - AO ROOT AREA: 8.2 CM^2
BH CV ECHO MEAS - AO ROOT DIAM: 3.2 CM
BH CV ECHO MEAS - BSA(HAYCOCK): 1.8 M^2
BH CV ECHO MEAS - BSA(HAYCOCK): 1.8 M^2
BH CV ECHO MEAS - BSA: 1.8 M^2
BH CV ECHO MEAS - BSA: 1.8 M^2
BH CV ECHO MEAS - BZI_BMI: 25 KILOGRAMS/M^2
BH CV ECHO MEAS - BZI_BMI: 25 KILOGRAMS/M^2
BH CV ECHO MEAS - BZI_METRIC_HEIGHT: 167.6 CM
BH CV ECHO MEAS - BZI_METRIC_HEIGHT: 167.6 CM
BH CV ECHO MEAS - BZI_METRIC_WEIGHT: 70.3 KG
BH CV ECHO MEAS - BZI_METRIC_WEIGHT: 70.3 KG
BH CV ECHO MEAS - EDV(CUBED): 227 ML
BH CV ECHO MEAS - EDV(MOD-SP2): 162 ML
BH CV ECHO MEAS - EDV(MOD-SP4): 117 ML
BH CV ECHO MEAS - EDV(TEICH): 186.9 ML
BH CV ECHO MEAS - EF(CUBED): 21.9 %
BH CV ECHO MEAS - EF(MOD-BP): 44 %
BH CV ECHO MEAS - EF(MOD-SP2): 47.5 %
BH CV ECHO MEAS - EF(MOD-SP4): 40.2 %
BH CV ECHO MEAS - EF(TEICH): 17.2 %
BH CV ECHO MEAS - ESV(CUBED): 177.3 ML
BH CV ECHO MEAS - ESV(MOD-SP2): 85 ML
BH CV ECHO MEAS - ESV(MOD-SP4): 70 ML
BH CV ECHO MEAS - ESV(TEICH): 154.8 ML
BH CV ECHO MEAS - FS: 7.9 %
BH CV ECHO MEAS - IVS/LVPW: 0.85
BH CV ECHO MEAS - IVSD: 0.72 CM
BH CV ECHO MEAS - LAD MAJOR: 4.8 CM
BH CV ECHO MEAS - LAT PEAK E' VEL: 4.5 CM/SEC
BH CV ECHO MEAS - LATERAL E/E' RATIO: 17.1
BH CV ECHO MEAS - LV DIASTOLIC VOL/BSA (35-75): 65.2 ML/M^2
BH CV ECHO MEAS - LV IVRT: 0.08 SEC
BH CV ECHO MEAS - LV MASS(C)D: 186.7 GRAMS
BH CV ECHO MEAS - LV MASS(C)DI: 104 GRAMS/M^2
BH CV ECHO MEAS - LV SYSTOLIC VOL/BSA (12-30): 39 ML/M^2
BH CV ECHO MEAS - LVIDD: 6.1 CM
BH CV ECHO MEAS - LVIDS: 5.6 CM
BH CV ECHO MEAS - LVLD AP2: 7.6 CM
BH CV ECHO MEAS - LVLD AP4: 7.3 CM
BH CV ECHO MEAS - LVLS AP2: 6.2 CM
BH CV ECHO MEAS - LVLS AP4: 6.4 CM
BH CV ECHO MEAS - LVOT AREA (M): 3.5 CM^2
BH CV ECHO MEAS - LVOT AREA: 3.4 CM^2
BH CV ECHO MEAS - LVOT DIAM: 2.1 CM
BH CV ECHO MEAS - LVPWD: 0.85 CM
BH CV ECHO MEAS - MED PEAK E' VEL: 4.5 CM/SEC
BH CV ECHO MEAS - MEDIAL E/E' RATIO: 17.1
BH CV ECHO MEAS - MV A MAX VEL: 104.9 CM/SEC
BH CV ECHO MEAS - MV DEC TIME: 0.2 SEC
BH CV ECHO MEAS - MV E MAX VEL: 79.6 CM/SEC
BH CV ECHO MEAS - MV E/A: 0.76
BH CV ECHO MEAS - PA ACC SLOPE: 815.3 CM/SEC^2
BH CV ECHO MEAS - PA ACC TIME: 0.1 SEC
BH CV ECHO MEAS - PA PR(ACCEL): 33.1 MMHG
BH CV ECHO MEAS - PULM A REVS VEL: 23.8 CM/SEC
BH CV ECHO MEAS - PULM DIAS VEL: 29.9 CM/SEC
BH CV ECHO MEAS - PULM S/D: 1.3
BH CV ECHO MEAS - PULM SYS VEL: 39.4 CM/SEC
BH CV ECHO MEAS - RAP SYSTOLE: 15 MMHG
BH CV ECHO MEAS - SI(CUBED): 27.7 ML/M^2
BH CV ECHO MEAS - SI(MOD-SP2): 42.9 ML/M^2
BH CV ECHO MEAS - SI(MOD-SP4): 26.2 ML/M^2
BH CV ECHO MEAS - SI(TEICH): 17.9 ML/M^2
BH CV ECHO MEAS - SV(CUBED): 49.7 ML
BH CV ECHO MEAS - SV(MOD-SP2): 77 ML
BH CV ECHO MEAS - SV(MOD-SP4): 47 ML
BH CV ECHO MEAS - SV(TEICH): 32.1 ML
BH CV ECHO MEAS - TAPSE (>1.6): 1.3 CM2
BH CV ECHO MEASUREMENTS AVERAGE E/E' RATIO: 17.69
BH CV VAS BP LEFT ARM: NORMAL MMHG
BH CV VAS BP LEFT ARM: NORMAL MMHG
BH CV XLRA - RV BASE: 3.9 CM
BH CV XLRA - RV LENGTH: 8.1 CM
BH CV XLRA - RV MID: 3.4 CM
BH CV XLRA - TDI S': 12.5 CM/SEC
BUN BLD-MCNC: 11 MG/DL (ref 8–23)
BUN/CREAT SERPL: 13.3 (ref 7–25)
CALCIUM SPEC-SCNC: 9.1 MG/DL (ref 8.6–10.5)
CHLORIDE SERPL-SCNC: 93 MMOL/L (ref 98–107)
CHOLEST SERPL-MCNC: 98 MG/DL (ref 0–200)
CO2 SERPL-SCNC: 29 MMOL/L (ref 22–29)
CREAT BLD-MCNC: 0.83 MG/DL (ref 0.76–1.27)
DEPRECATED RDW RBC AUTO: 51.1 FL (ref 37–54)
EOSINOPHIL # BLD AUTO: 0.04 10*3/MM3 (ref 0–0.4)
EOSINOPHIL NFR BLD AUTO: 0.5 % (ref 0.3–6.2)
ERYTHROCYTE [DISTWIDTH] IN BLOOD BY AUTOMATED COUNT: 16.9 % (ref 12.3–15.4)
GFR SERPL CREATININE-BSD FRML MDRD: 91 ML/MIN/1.73
GLUCOSE BLD-MCNC: 151 MG/DL (ref 65–99)
GLUCOSE BLDC GLUCOMTR-MCNC: 134 MG/DL (ref 70–130)
GLUCOSE BLDC GLUCOMTR-MCNC: 138 MG/DL (ref 70–130)
GLUCOSE BLDC GLUCOMTR-MCNC: 154 MG/DL (ref 70–130)
GLUCOSE BLDC GLUCOMTR-MCNC: 371 MG/DL (ref 70–130)
HBA1C MFR BLD: 7.7 % (ref 4.8–5.6)
HCT VFR BLD AUTO: 40.2 % (ref 37.5–51)
HDLC SERPL-MCNC: 36 MG/DL (ref 40–60)
HGB BLD-MCNC: 12.9 G/DL (ref 13–17.7)
IMM GRANULOCYTES # BLD AUTO: 0.05 10*3/MM3 (ref 0–0.05)
IMM GRANULOCYTES NFR BLD AUTO: 0.6 % (ref 0–0.5)
LDLC SERPL CALC-MCNC: 47 MG/DL (ref 0–100)
LDLC/HDLC SERPL: 1.3 {RATIO}
LEFT ATRIUM VOLUME INDEX: 29.5 ML/M^2
LEFT ATRIUM VOLUME: 53 ML
LV EF 2D ECHO EST: 30 %
LYMPHOCYTES # BLD AUTO: 1.37 10*3/MM3 (ref 0.7–3.1)
LYMPHOCYTES NFR BLD AUTO: 16.7 % (ref 19.6–45.3)
MAGNESIUM SERPL-MCNC: 1.6 MG/DL (ref 1.6–2.4)
MAXIMAL PREDICTED HEART RATE: 146 BPM
MCH RBC QN AUTO: 26.5 PG (ref 26.6–33)
MCHC RBC AUTO-ENTMCNC: 32.1 G/DL (ref 31.5–35.7)
MCV RBC AUTO: 82.5 FL (ref 79–97)
MONOCYTES # BLD AUTO: 0.88 10*3/MM3 (ref 0.1–0.9)
MONOCYTES NFR BLD AUTO: 10.7 % (ref 5–12)
NEUTROPHILS # BLD AUTO: 5.89 10*3/MM3 (ref 1.7–7)
NEUTROPHILS NFR BLD AUTO: 71.6 % (ref 42.7–76)
PLATELET # BLD AUTO: 291 10*3/MM3 (ref 140–450)
PMV BLD AUTO: 8.8 FL (ref 6–12)
POTASSIUM BLD-SCNC: 4 MMOL/L (ref 3.5–5.2)
RBC # BLD AUTO: 4.87 10*6/MM3 (ref 4.14–5.8)
SODIUM BLD-SCNC: 136 MMOL/L (ref 136–145)
STRESS TARGET HR: 124 BPM
TRIGL SERPL-MCNC: 76 MG/DL (ref 0–150)
TROPONIN T SERPL-MCNC: <0.01 NG/ML (ref 0–0.03)
TROPONIN T SERPL-MCNC: <0.01 NG/ML (ref 0–0.03)
UFH PPP CHRO-ACNC: 0.12 IU/ML (ref 0.3–0.7)
UFH PPP CHRO-ACNC: 0.21 IU/ML (ref 0.3–0.7)
UFH PPP CHRO-ACNC: 0.3 IU/ML (ref 0.3–0.7)
UFH PPP CHRO-ACNC: 0.46 IU/ML (ref 0.3–0.7)
VLDLC SERPL-MCNC: 15.2 MG/DL
WBC NRBC COR # BLD: 8.22 10*3/MM3 (ref 3.4–10.8)

## 2019-05-01 PROCEDURE — 25010000002 FUROSEMIDE PER 20 MG: Performed by: NURSE PRACTITIONER

## 2019-05-01 PROCEDURE — 82962 GLUCOSE BLOOD TEST: CPT

## 2019-05-01 PROCEDURE — 83735 ASSAY OF MAGNESIUM: CPT | Performed by: NURSE PRACTITIONER

## 2019-05-01 PROCEDURE — 5A2204Z RESTORATION OF CARDIAC RHYTHM, SINGLE: ICD-10-PCS | Performed by: INTERNAL MEDICINE

## 2019-05-01 PROCEDURE — 84484 ASSAY OF TROPONIN QUANT: CPT | Performed by: NURSE PRACTITIONER

## 2019-05-01 PROCEDURE — 99232 SBSQ HOSP IP/OBS MODERATE 35: CPT | Performed by: INTERNAL MEDICINE

## 2019-05-01 PROCEDURE — 85520 HEPARIN ASSAY: CPT | Performed by: INTERNAL MEDICINE

## 2019-05-01 PROCEDURE — 80061 LIPID PANEL: CPT | Performed by: NURSE PRACTITIONER

## 2019-05-01 PROCEDURE — 86900 BLOOD TYPING SEROLOGIC ABO: CPT

## 2019-05-01 PROCEDURE — 25010000002 MIDAZOLAM PER 1 MG: Performed by: INTERNAL MEDICINE

## 2019-05-01 PROCEDURE — B24BZZ4 ULTRASONOGRAPHY OF HEART WITH AORTA, TRANSESOPHAGEAL: ICD-10-PCS | Performed by: INTERNAL MEDICINE

## 2019-05-01 PROCEDURE — 25010000002 DIGOXIN PER 500 MCG: Performed by: INTERNAL MEDICINE

## 2019-05-01 PROCEDURE — 93320 DOPPLER ECHO COMPLETE: CPT | Performed by: INTERNAL MEDICINE

## 2019-05-01 PROCEDURE — 25010000002 AMIODARONE IN DEXTROSE 5% 360-4.14 MG/200ML-% SOLUTION: Performed by: INTERNAL MEDICINE

## 2019-05-01 PROCEDURE — 85025 COMPLETE CBC W/AUTO DIFF WBC: CPT | Performed by: NURSE PRACTITIONER

## 2019-05-01 PROCEDURE — 99222 1ST HOSP IP/OBS MODERATE 55: CPT | Performed by: INTERNAL MEDICINE

## 2019-05-01 PROCEDURE — 80048 BASIC METABOLIC PNL TOTAL CA: CPT | Performed by: NURSE PRACTITIONER

## 2019-05-01 PROCEDURE — 99153 MOD SED SAME PHYS/QHP EA: CPT

## 2019-05-01 PROCEDURE — 92960 CARDIOVERSION ELECTRIC EXT: CPT | Performed by: INTERNAL MEDICINE

## 2019-05-01 PROCEDURE — 93306 TTE W/DOPPLER COMPLETE: CPT | Performed by: INTERNAL MEDICINE

## 2019-05-01 PROCEDURE — 93312 ECHO TRANSESOPHAGEAL: CPT | Performed by: INTERNAL MEDICINE

## 2019-05-01 PROCEDURE — 83036 HEMOGLOBIN GLYCOSYLATED A1C: CPT | Performed by: NURSE PRACTITIONER

## 2019-05-01 PROCEDURE — 63710000001 INSULIN LISPRO (HUMAN) PER 5 UNITS: Performed by: NURSE PRACTITIONER

## 2019-05-01 PROCEDURE — 99152 MOD SED SAME PHYS/QHP 5/>YRS: CPT

## 2019-05-01 PROCEDURE — 85520 HEPARIN ASSAY: CPT

## 2019-05-01 PROCEDURE — 93312 ECHO TRANSESOPHAGEAL: CPT

## 2019-05-01 PROCEDURE — 92960 CARDIOVERSION ELECTRIC EXT: CPT

## 2019-05-01 PROCEDURE — 93325 DOPPLER ECHO COLOR FLOW MAPG: CPT

## 2019-05-01 PROCEDURE — 93306 TTE W/DOPPLER COMPLETE: CPT

## 2019-05-01 PROCEDURE — 25010000002 AMIODARONE IN DEXTROSE 5% 150-4.21 MG/100ML-% SOLUTION: Performed by: INTERNAL MEDICINE

## 2019-05-01 PROCEDURE — 93321 DOPPLER ECHO F-UP/LMTD STD: CPT

## 2019-05-01 PROCEDURE — 86901 BLOOD TYPING SEROLOGIC RH(D): CPT

## 2019-05-01 PROCEDURE — 25010000002 HEPARIN (PORCINE) PER 1000 UNITS

## 2019-05-01 PROCEDURE — 25010000002 HEPARIN (PORCINE) IN NACL 25000-0.45 UT/250ML-% SOLUTION

## 2019-05-01 PROCEDURE — 93325 DOPPLER ECHO COLOR FLOW MAPG: CPT | Performed by: INTERNAL MEDICINE

## 2019-05-01 RX ORDER — HEPARIN SODIUM 1000 [USP'U]/ML
4000 INJECTION, SOLUTION INTRAVENOUS; SUBCUTANEOUS ONCE
Status: COMPLETED | OUTPATIENT
Start: 2019-05-01 | End: 2019-05-01

## 2019-05-01 RX ORDER — MAGNESIUM SULFATE HEPTAHYDRATE 40 MG/ML
4 INJECTION, SOLUTION INTRAVENOUS AS NEEDED
Status: DISCONTINUED | OUTPATIENT
Start: 2019-05-01 | End: 2019-05-08

## 2019-05-01 RX ORDER — MIDAZOLAM HYDROCHLORIDE 1 MG/ML
INJECTION INTRAMUSCULAR; INTRAVENOUS
Status: COMPLETED | OUTPATIENT
Start: 2019-05-01 | End: 2019-05-01

## 2019-05-01 RX ORDER — DIGOXIN 0.25 MG/ML
250 INJECTION INTRAMUSCULAR; INTRAVENOUS ONCE
Status: COMPLETED | OUTPATIENT
Start: 2019-05-01 | End: 2019-05-01

## 2019-05-01 RX ORDER — NALOXONE HYDROCHLORIDE 0.4 MG/ML
INJECTION, SOLUTION INTRAMUSCULAR; INTRAVENOUS; SUBCUTANEOUS
Status: DISCONTINUED
Start: 2019-05-01 | End: 2019-05-01 | Stop reason: WASHOUT

## 2019-05-01 RX ORDER — MAGNESIUM SULFATE HEPTAHYDRATE 40 MG/ML
2 INJECTION, SOLUTION INTRAVENOUS AS NEEDED
Status: DISCONTINUED | OUTPATIENT
Start: 2019-05-01 | End: 2019-05-08

## 2019-05-01 RX ORDER — FENTANYL CITRATE 50 UG/ML
INJECTION, SOLUTION INTRAMUSCULAR; INTRAVENOUS
Status: DISCONTINUED
Start: 2019-05-01 | End: 2019-05-06

## 2019-05-01 RX ORDER — FLUMAZENIL 0.1 MG/ML
INJECTION INTRAVENOUS
Status: DISCONTINUED
Start: 2019-05-01 | End: 2019-05-01 | Stop reason: WASHOUT

## 2019-05-01 RX ORDER — MIDAZOLAM HYDROCHLORIDE 1 MG/ML
INJECTION INTRAMUSCULAR; INTRAVENOUS
Status: DISCONTINUED
Start: 2019-05-01 | End: 2019-05-06

## 2019-05-01 RX ADMIN — FUROSEMIDE 20 MG: 10 INJECTION, SOLUTION INTRAMUSCULAR; INTRAVENOUS at 08:57

## 2019-05-01 RX ADMIN — MIDAZOLAM HYDROCHLORIDE 1 MG: 1 INJECTION, SOLUTION INTRAMUSCULAR; INTRAVENOUS at 14:40

## 2019-05-01 RX ADMIN — INSULIN LISPRO 2 UNITS: 100 INJECTION, SOLUTION INTRAVENOUS; SUBCUTANEOUS at 11:43

## 2019-05-01 RX ADMIN — MAGNESIUM SULFATE HEPTAHYDRATE 4 G: 40 INJECTION, SOLUTION INTRAVENOUS at 11:43

## 2019-05-01 RX ADMIN — PANTOPRAZOLE SODIUM 40 MG: 40 TABLET, DELAYED RELEASE ORAL at 08:58

## 2019-05-01 RX ADMIN — METHOHEXITAL SODIUM 20 MG: 500 INJECTION, POWDER, LYOPHILIZED, FOR SOLUTION INTRAMUSCULAR; INTRAVENOUS; RECTAL at 14:24

## 2019-05-01 RX ADMIN — HEPARIN SODIUM 4000 UNITS: 1000 INJECTION, SOLUTION INTRAVENOUS; SUBCUTANEOUS at 03:41

## 2019-05-01 RX ADMIN — DOXYCYCLINE 100 MG: 100 CAPSULE ORAL at 20:39

## 2019-05-01 RX ADMIN — HEPARIN SODIUM 16 UNITS/KG/HR: 10000 INJECTION, SOLUTION INTRAVENOUS at 16:04

## 2019-05-01 RX ADMIN — AMIODARONE HYDROCHLORIDE 150 MG: 1.5 INJECTION, SOLUTION INTRAVENOUS at 15:10

## 2019-05-01 RX ADMIN — AMIODARONE HYDROCHLORIDE 0.5 MG/MIN: 1.8 INJECTION, SOLUTION INTRAVENOUS at 20:40

## 2019-05-01 RX ADMIN — MIDAZOLAM HYDROCHLORIDE 1 MG: 1 INJECTION, SOLUTION INTRAMUSCULAR; INTRAVENOUS at 14:36

## 2019-05-01 RX ADMIN — METHOHEXITAL SODIUM 20 MG: 500 INJECTION, POWDER, LYOPHILIZED, FOR SOLUTION INTRAMUSCULAR; INTRAVENOUS; RECTAL at 14:42

## 2019-05-01 RX ADMIN — INSULIN LISPRO 6 UNITS: 100 INJECTION, SOLUTION INTRAVENOUS; SUBCUTANEOUS at 20:39

## 2019-05-01 RX ADMIN — SODIUM CHLORIDE, PRESERVATIVE FREE 3 ML: 5 INJECTION INTRAVENOUS at 09:00

## 2019-05-01 RX ADMIN — METHOHEXITAL SODIUM 10 MG: 500 INJECTION, POWDER, LYOPHILIZED, FOR SOLUTION INTRAMUSCULAR; INTRAVENOUS; RECTAL at 14:36

## 2019-05-01 RX ADMIN — DIGOXIN 250 MCG: 0.25 INJECTION INTRAMUSCULAR; INTRAVENOUS at 11:34

## 2019-05-01 RX ADMIN — MIDAZOLAM HYDROCHLORIDE 1 MG: 1 INJECTION, SOLUTION INTRAMUSCULAR; INTRAVENOUS at 14:28

## 2019-05-01 RX ADMIN — AMIODARONE HYDROCHLORIDE 1 MG/MIN: 1.8 INJECTION, SOLUTION INTRAVENOUS at 15:17

## 2019-05-01 RX ADMIN — DOXYCYCLINE 100 MG: 100 CAPSULE ORAL at 08:59

## 2019-05-01 RX ADMIN — MIDAZOLAM HYDROCHLORIDE 1 MG: 1 INJECTION, SOLUTION INTRAMUSCULAR; INTRAVENOUS at 14:24

## 2019-05-01 RX ADMIN — ASPIRIN 81 MG: 81 TABLET, COATED ORAL at 08:58

## 2019-05-01 RX ADMIN — SODIUM CHLORIDE, PRESERVATIVE FREE 3 ML: 5 INJECTION INTRAVENOUS at 20:39

## 2019-05-01 NOTE — CONSULTS
[facility] at [facility] - Cardiology Consult    Francesco Arroyo  1945  [id number]/1    Admit Date:  2019      PCP:  Alex Velez DO Req MD:  Dr. Palacios - Hospitalist  Consulting MD:  Dr. Piyush Avitia    19    CC:  BLE Edema, fatigue  Reason for Consult: Atrial Fibrillation with RVR    PROBLEM LIST/PMHx:  1.  Atrial fibrillation with RVR (CMS/HCC)   A.  Presumed paroxysmal   B.  CHADS2 Vasc = 3.  2.  Dyslipidemia  3.  History of esophageal cancer  4.  Type 2 diabetes mellitus (CMS/[facility])  5.  COPD (chronic obstructive pulmonary disease) (CMS/[facility])  6.  GERD without esophagitis  7.  Bilateral pleural effusion  8.  Ongoing Smoking Tobacco Abuse      Allergies:  has No Known Allergies.    Medications Prior to Admission   Medication Sig Dispense Refill Last Dose   • aspirin 81 MG EC tablet Take 81 mg by mouth Daily.      • [] doxycycline (VIBRAMYCIN) 100 MG capsule Take 100 mg by mouth 2 (Two) Times a Day.      • furosemide (LASIX) 20 MG tablet Take 20 mg by mouth Daily.      • glyBURIDE-metFORMIN (GLUCOVANCE) 5-500 MG per tablet Take 1 tablet by mouth 2 (Two) Times a Day With Meals.      • HYDROcodone-acetaminophen (NORCO) 5-325 MG per tablet Take 1 tablet by mouth Every 8 (Eight) Hours As Needed.      • albuterol (PROVENTIL) (2.5 MG/3ML) 0.083% nebulizer solution USE CONTENTS OF 1 VIAL VIA NEBULIZER EVERY 6 HOURS  0 Taking   • esomeprazole (nexIUM) 40 MG capsule TAKE ONE CAPSULE BY MOUTH EVERY DAY  3 Taking         Current Hospital Scheduled Meds:  aspirin 81 mg Oral Daily   doxycycline 100 mg Oral Q12H   insulin lispro 0-7 Units Subcutaneous 4x Daily With Meals & Nightly   nicotine 1 patch Transdermal Q24H   pantoprazole 40 mg Oral QAM   pharmacy consult - MTM  Does not apply Daily   sodium chloride 3 mL Intravenous Q12H     Continuous Infusions:  diltiaZEM 5-15 mg/hr Last Rate: Stopped (19)   heparin 16 Units/kg/hr Last Rate: 12 Units/kg/hr  (04/30/19 9899)   Pharmacy to Dose Heparin         CARDIAC RISK FACTORS:   advanced age (older than 55 for men, 65 for women), diabetes mellitus, dyslipidemia, male gender, sedentary lifestyle and smoking/ tobacco exposure.         HPI:  Francesco Arroyo  is a 73 y.o.  male with PMH significant for esophageal cancer, NIDDM2, HLP, CAD, COPD with ongoing tobacco abuse, and GERD.  He presents to the ED with complaint of fatigue and bilateral lower extremity edema.  He states that he has had intermittent lower extremity edema since January.  But over the last 10 days he has had increased bilateral lower extremity edema that has not resolved.  He was evaluated by PCP and given oral Lasix,  which he has taken for the past 2 days without any relief.  He denies any SOA, chest pain, or fever.  He does admit to being more fatigued than usual.  Upon arrival to [facility] ED, he was found to be in  A. fib with RVR.  He wass started on Cardizem and heparin drip.  Additionally he was given 20 mg IV Lasix with good urinary output.  He was admitted to hospital medicine for further evaluation.    Cardiology has been asked to see Mr. Arroyo in consultation for further evaluation and management of the atrial fibrillation and acute CHF.  Echo has been ordered by the admitting team.  Mr. Arroyo states that back in January he developed an upper respiratory infection which was initially thought to be pneumonia.  He was treated with bronchitis and admitted for observation at [facility].  He had several EKGs at that time which were reportedly normal but he was advised to follow-up with cardiology.  As an outpatient, he followed up with Dr. Jacobo and was scheduled for what sounds to be a stress test.  He states no further intervention was performed but he was advised to take a statin.  He trialed this for approximately 3 weeks but developed lower extremity edema and stopped the medication.  He also consulted with  "pulmonology at that time but has not followed up stating that his symptoms never improved.  He has had continued intermittent swelling since then with significant increase over the last 3 weeks.  As noted above, upon arrival to the ED was found to be in atrial fibrillation with RVR.  Patient is unaware of his arrhythmia other than his acute CHF symptoms likely resulting from the rhythm.  He denies chest pain or exertional angina, he denies dyspnea orthopnea.  He denies palpitations or irregular heartbeats.  He does admit to fatigue and again, lower extremity edema.  He has had no change in bowel or bladder habits.  Denies stroke or TIA-like symptoms.  Denies any recent fevers, chills.    As far as esophageal cancer is concerned, he states he has a follow-up with Dr. Hinds this coming Friday.    Social History     Socioeconomic History   • Marital status:      Spouse name: Not on file   • Number of children: Not on file   • Years of education: Not on file   • Highest education level: Not on file   Tobacco Use   • Smoking status: Current Every Day Smoker     Years: 0.50     Types: Cigarettes   • Smokeless tobacco: Never Used   Substance and Sexual Activity   • Alcohol use: Yes     Frequency: Never     Comment: occ   • Drug use: No   • Sexual activity: Defer     History reviewed. No pertinent family history.  Past Surgical History:   Procedure Laterality Date   • ESOPHAGUS SURGERY     • OTHER SURGICAL HISTORY  03/31/2008    Resection of esophageal cancer     ROS: Pertinent items are noted in HPI, all other systems reviewed and negative     Objective     height is 167.6 cm (65.98\") and weight is 70.4 kg (155 lb 4.8 oz). His oral temperature is 97.9 °F (36.6 °C). His blood pressure is 115/95 and his pulse is 130 (abnormal). His respiration is 20 and oxygen saturation is 96%.  No intake or output data in the 24 hours ending 05/01/19 0939      Physical Exam:  General Appearance:    Alert, cooperative, in no acute " distress   Head:    Normocephalic, without obvious abnormality, atraumatic   Eyes:            Lids and lashes normal, conjunctivae and sclerae normal, no   icterus, no pallor, corneas clear, PERRLA   Ears:    Ears appear intact with no abnormalities noted   Throat:   No oral lesions, no thrush, oral mucosa moist   Neck:   No adenopathy, supple, trachea midline, no thyromegaly, no     carotid bruit, no JVD   Back:     No kyphosis present, no scoliosis present, no skin lesions,       erythema or scars, no tenderness to percussion or                   palpation,   range of motion normal   Lungs:     Clear to auscultation,respirations regular, even and                   unlabored    Heart:   Irregular irregular rhythm with tachycardic rate, normal S1 and S2, no  murmur, no gallop, no rub, no click       Abdomen:     Normal bowel sounds, no masses, no organomegaly, soft        non-tender, non-distended, no guarding, no rebound                 tenderness       Extremities:   Moves all extremities well,  no cyanosis, no redness, 1+ edema   Pulses:   Pulses palpable and equal bilaterally   Skin:   No bleeding, bruising or rash   Lymph nodes:   No palpable adenopathy   Neurologic:   Cranial nerves 2 - 12 grossly intact, sensation intact, DTR        present and equal bilaterally          Results Review:  I personally reviewed the patient's clinical results.  Results from last 7 days   Lab Units 05/01/19  0608   WBC 10*3/mm3 8.22   HEMOGLOBIN g/dL 12.9*   HEMATOCRIT % 40.2   PLATELETS 10*3/mm3 291     Results from last 7 days   Lab Units 05/01/19  0608 04/30/19  1528   SODIUM mmol/L 136 133*   POTASSIUM mmol/L 4.0 3.9   CHLORIDE mmol/L 93* 89*   CO2 mmol/L 29.0 27.0   BUN mg/dL 11 13   CREATININE mg/dL 0.83 0.85   CALCIUM mg/dL 9.1 9.3   BILIRUBIN mg/dL  --  0.6   ALK PHOS U/L  --  98   ALT (SGPT) U/L  --  12   AST (SGOT) U/L  --  16   GLUCOSE mg/dL 151* 137*     Troponin x 2:  <0.01      Results from last 7 days   Lab Units  "04/30/19  1528   INR  1.17*     Results from last 7 days   Lab Units 04/30/19  1528   TSH mIU/mL 1.380     Results from last 7 days   Lab Units 05/01/19  0608   CHOLESTEROL mg/dL 98   TRIGLYCERIDES mg/dL 76   HDL CHOL mg/dL 36*   LDL CHOL mg/dL 47     Results from last 7 days   Lab Units 04/30/19  1528   PROBNP pg/mL 1,689.0*       Radiology:  Imaging Results (last 72 hours)     Procedure Component Value Units Date/Time    XR Chest 1 View [[id number]] Collected:  04/30/19 1635     Updated:  04/30/19 1859    Narrative:       EXAMINATION: XR CHEST 1 VW-04/30/2019:      INDICATION: SOA, triage protocol.      COMPARISON: NONE.     FINDINGS: Cardiac size borderline enlarged. Chronic lung changes without  focal opacification or consolidation. Probable trace bilateral pleural  effusions with mild interstitial prominence. No pneumothorax.           Impression:       Borderline cardiomegaly with trace bilateral pleural  effusions and chronic lung changes.     D:  04/30/2019  E:  04/30/2019        This report was finalized on 4/30/2019 6:56 PM by Dr. Yared Lemus.               Tele:  Atrial Fibrillation    Assessment/Plan     1.  Atrial Fibrillation with RVR   -  Presumed paroxysmal   -  CHADS2 Vasc = 3.  Coninue heparin gtt for now and switch to NOAC post procedure.   -  Proceed with LENKA/ECV.  Risks and benefits reviewed.  Further recommendations based on outcomes.   -  Trial digoxin 0.5mg IV x1 for now for rate control.    2.  Ongoing tobacco abuse with COPD   -Cessation education has been provided to the patient.    3.  Acute CHF, likely related to #1   -Echo has been performed and we will review.   -Agree with short-term Lasix   -Swelling will likely improve once patient is in normal sinus rhythm    4.  Dyslipidemia   -  Lipid panel reviewed.   -  Patient has previously trialed statin therapy but developed swelling around the same time.  He stopped medication on his own secondary to this \"side effects.\"  Advised him that " is not a side effect of the statin therapy but after reviewing his lipid panel, will proceed with appropriate dietary measures at this time only.        I discussed the patients findings and my recommendations with the patient, any present family members, and the nursing staff.  Piyush Avitia MD saw and examined patient, verified hx and PE, read all radiographic studies, reviewed labs and micro data, and formulated dx, plan for treatment and all medical decision making.      Nadine Zavaleta PA-C, functioning as a scribe for Piyush Avitia MD  05/01/19 9:39 AM  I agree with the above assessment and plan.  Purely the patient's been feeling bad for about a month with progressive dyspnea progressive lower remedy edema and then developed some orthopnea.  The patient does not feel palpitations.  He denies tightness heaviness squeezing pressure chest jaw throat or arms.  All his echocardiogram his EF in the 30s and his LV is dilated at 6.0.  No obvious significant valve disease is noted.  His rate is fast up to 150 while he is in the room.  Prior to this illness he does have COPD with wheezing daily.  The patient is a former smoker for 60 years.  Physical exam General he is a thin white male with resting tachypnea but no overt dyspnea and no acute distress, his heart rates 142.  HEENT-there is no significant JVP noted sclera no icterus  Cardiovascular-he is tachycardic and irregular without a murmur he has pitting lower extremity edema  Respiratory-equal bilateral symmetrical expansion and some wheezes are noted.  We will go and give the patient some IV dig.  Continue diuresis.  Plan for LENKA cardioversion today.  The patient will need to be on Eliquis.. Since the patient has significant lung disease will have to be very careful about which beta-blocker and/or antiarrhythmic drug we choose

## 2019-05-01 NOTE — PROGRESS NOTES
HEPARIN INFUSION  Francesco Arroyo is a  73 y.o. male receiving heparin infusion.     Therapy for: cardiac, new onset afib  Patient Weight: 68 kg  Initial Bolus (Y/N):   yes  Any Bolus (Y/N):   yes        Signs or Symptoms of Bleeding: no      Cardiac or Other (Not VTE)   Initial Bolus: 60 units/kg (Max 4,000 units)  Initial rate: 12 units/kg/hr (Max 1,000 units/hr)   Anti-Xa (IU/mL) Bolus Dose Stop Infusion Rate Change Repeat Anti-Xa      ?0.19 60 units/kg 0 hrs Increase rate by   4 units/kg/hr 6 hrs       0.2 - 0.29  30 units/kg 0 hrs Increase rate by 2 units/kg/hr 6 hrs    0.3 - 0.7 0 0 hrs No change 6 hrs      0.71 - 0.99 0  0 hrs Decrease rate by 2 units/kg/hr 6 hrs            ?1 0 Hold 1 hr Decrease rate by 3 units/kg/hr 6 hrs        Results from last 7 days   Lab Units 05/01/19  0608 04/30/19  1958 04/30/19  1528   INR   --   --  1.17*   HEMOGLOBIN g/dL 12.9* 12.2* 13.3   HEMATOCRIT % 40.2 36.8* 39.8   PLATELETS 10*3/mm3 291 324 312          Date   Time   Anti-Xa Current Rate (Unit/kg/hr) Bolus   (Units) Rate Change   (Unit/kg/hr) New Rate (Unit/kg/hr) Next   Anti-Xa Comments  Pump Check Daily   4-30 1730 0.10 -- 4000 -- 12 0000 New start in er. Spoke w/ Elda    5/1/19 01:12 0.12 12 4000 +4 16 09:00 Discussed w/ nurse   5/1 0901 0.46 16 -- -- 16 1500 D/w Angelica, pump weight and rate verified                                                                                                                                                                                                           Brittany Schmidt [facility]  5/1/2019  1:17 PM

## 2019-05-01 NOTE — PROGRESS NOTES
[facility] Medicine Services  PROGRESS NOTE    Patient Name: Francesco Arroyo  : 1945  MRN: [id number]    Date of Admission: 2019  Length of Stay: 1  Primary Care Physician: Alex Velez DO    Subjective   Subjective     CC:  Atrial fibrillation    HPI:  Still having some palpitations. Denies chest pain. Feels hungry.    Review of Systems  Gen- No fevers, chills  CV- No chest pain, + palpitations  Resp- No cough, dyspnea  GI- No N/V/D, abd pain      Otherwise ROS is negative except as mentioned in the HPI.    Objective   Objective     Vital Signs:   Temp:  [97.7 °F (36.5 °C)-98.7 °F (37.1 °C)] 98.3 °F (36.8 °C)  Heart Rate:  [] 104  Resp:  [20-22] 20  BP: ()/() 142/106        Physical Exam:  Constitutional -no acute distress, non toxic, in bed, thin male  HEENT-NCAT, mucous membranes moist  CV-irregularly irregular, mild tachycardia  Resp-grossly clear bilaterally  Abd-soft, non-tender, non-distended, normo active bowel sounds  Ext-No lower extremity cyanosis, clubbing or edema bilaterally  Neuro-alert and oriented, speech clear, moves all extremities   Psych-normal affect   Skin- No rash on exposed UE or LE bilaterally        Results Reviewed:  I have personally reviewed current lab, radiology, and data and agree.    Results from last 7 days   Lab Units 19  0619  1528   WBC 10*3/mm3 8.22 9.86 10.71   HEMOGLOBIN g/dL 12.9* 12.2* 13.3   HEMATOCRIT % 40.2 36.8* 39.8   PLATELETS 10*3/mm3 291 324 312   INR   --   --  1.17*     Results from last 7 days   Lab Units 19  0619  1528   SODIUM mmol/L 136 133*   POTASSIUM mmol/L 4.0 3.9   CHLORIDE mmol/L 93* 89*   CO2 mmol/L 29.0 27.0   BUN mg/dL 11 13   CREATININE mg/dL 0.83 0.85   GLUCOSE mg/dL 151* 137*   CALCIUM mg/dL 9.1 9.3   ALT (SGPT) U/L  --  12   AST (SGOT) U/L  --  16     Estimated Creatinine Clearance: 77.6 mL/min (by C-G formula based on SCr of 0.83  mg/dL).    No results found for: BNP    Microbiology Results Abnormal     None          Imaging Results (last 24 hours)     Procedure Component Value Units Date/Time    XR Chest 1 View [[id number]] Collected:  04/30/19 1635     Updated:  04/30/19 3849    Narrative:       EXAMINATION: XR CHEST 1 VW-04/30/2019:      INDICATION: SOA, triage protocol.      COMPARISON: NONE.     FINDINGS: Cardiac size borderline enlarged. Chronic lung changes without  focal opacification or consolidation. Probable trace bilateral pleural  effusions with mild interstitial prominence. No pneumothorax.           Impression:       Borderline cardiomegaly with trace bilateral pleural  effusions and chronic lung changes.     D:  04/30/2019  E:  04/30/2019        This report was finalized on 4/30/2019 6:56 PM by Dr. Yared Lemus.             Results for orders placed during the hospital encounter of 04/30/19   Adult Transthoracic Echo Complete With Contrast if Necessary Per Protocol    Narrative · The left ventricular cavity is mild-to-moderately dilated.  · There is mild calcification of the aortic valve mainly affecting the   non, left and right coronary cusp(s).  · Left ventricular diastolic dysfunction (grade I a) consistent with   impaired relaxation.  · Adjacent to the right ventricle and adjacent to the right atrium.  · Estimated EF = 30%.  · There is a moderate size pericardial effusion with mild right atrial and   mild right ventricular diastolic collapse.  · Of note the history as the patient has A. fib with RVR. However there   are E and A waves present on the tracing so this may be atrial flutter as   opposed to A. fib  · There is moderate left ventricular enlargement and global LV hypokinesis          I have reviewed the medications:    Current Facility-Administered Medications:   •  acetaminophen (TYLENOL) tablet 650 mg, 650 mg, Oral, Q6H PRN, Justine Bueno R, APRN  •  [COMPLETED] amiodarone in dextrose 5% (NEXTERONE) loading dose  150mg/100mL, 150 mg, Intravenous, Once, 150 mg at 05/01/19 1510 **FOLLOWED BY** amiodarone (NEXTERONE) 360 mg/200 mL (1.8 mg/mL) infusion, 1 mg/min, Intravenous, Continuous, Last Rate: 33.3 mL/hr at 05/01/19 1517, 1 mg/min at 05/01/19 1517 **FOLLOWED BY** amiodarone (NEXTERONE) 360 mg/200 mL (1.8 mg/mL) infusion, 0.5 mg/min, Intravenous, Continuous, Juan Carlos Coleman MD  •  aspirin EC tablet 81 mg, 81 mg, Oral, Daily, Justine Bueno APRN, 81 mg at 05/01/19 0858  •  dextrose (D50W) 25 g/ 50mL Intravenous Solution 25 g, 25 g, Intravenous, Q15 Min PRN, Justine Bueno APRN  •  dextrose (GLUTOSE) oral gel 15 g, 15 g, Oral, Q15 Min PRN, Justine Bueno APRN  •  diltiaZEM (CARDIZEM) 125 mg in sodium chloride 0.9 % 125 mL (1 mg/mL) infusion, 5-15 mg/hr, Intravenous, Titrated, Wilmar Gross PA, Stopped at 04/30/19 1953  •  doxycycline (MONODOX) capsule 100 mg, 100 mg, Oral, Q12H, Justine Bueno APRN, 100 mg at 05/01/19 0859  •  fentaNYL citrate (PF) (SUBLIMAZE) 100 MCG/2ML injection  - ADS Override Pull, , , ,   •  glucagon (human recombinant) (GLUCAGEN DIAGNOSTIC) injection 1 mg, 1 mg, Subcutaneous, PRN, Justine Bueno APRN  •  heparin 08828 units/250 mL (100 units/mL) in 0.45 % NaCl infusion, 16 Units/kg/hr, Intravenous, Titrated, Levon Heller [facility], Last Rate: 10.88 mL/hr at 05/01/19 1604, 16 Units/kg/hr at 05/01/19 1604  •  insulin lispro (humaLOG) injection 0-7 Units, 0-7 Units, Subcutaneous, 4x Daily With Meals & Nightly, Justine Bueno APRN, Stopped at 05/01/19 1800  •  ipratropium-albuterol (DUO-NEB) nebulizer solution 3 mL, 3 mL, Nebulization, Q4H PRN, Justine Bueno, APRN  •  Magnesium Sulfate 2 gram Bolus, followed by 8 gram infusion (total Mg dose 10 grams)- Mg less than or equal to 1mg/dL, 2 g, Intravenous, PRN **OR** Magnesium Sulfate 2 gram / 50mL Infusion (GIVE X 3 BAGS TO EQUAL 6GM TOTAL DOSE) - Mg 1.1 - 1.5 mg/dl, 2 g, Intravenous, PRN **OR** Magnesium Sulfate 4 gram  infusion- Mg 1.6-1.9 mg/dL, 4 g, Intravenous, PRN, Rodrigo Palacios MD, Last Rate: 25 mL/hr at 05/01/19 1143, 4 g at 05/01/19 1143  •  methohexital (BREVITAL) 50 MG/5ML injection solution prefilled syringe  - ADS Override Pull, , , ,   •  midazolam (VERSED) 2 MG/2ML injection  - ADS Override Pull, , , ,   •  nicotine (NICODERM CQ) 21 MG/24HR patch 1 patch, 1 patch, Transdermal, Q24H, Justine Bueno APRN, Stopped at 05/01/19 0901  •  pantoprazole (PROTONIX) EC tablet 40 mg, 40 mg, Oral, QAM, Justine Bueno APRN, 40 mg at 05/01/19 0858  •  Pharmacy Consult - [place], , Does not apply, Daily, Brittany Schmidt, [facility]  •  Pharmacy to Dose Heparin, , Does not apply, Continuous PRN, Nury Tariq, [facility]  •  sodium chloride 0.9 % flush 10 mL, 10 mL, Intravenous, PRN, Jamie Colon MD  •  sodium chloride 0.9 % flush 3 mL, 3 mL, Intravenous, Q12H, Justine Bueno APRN, 3 mL at 05/01/19 0900  •  sodium chloride 0.9 % flush 3-10 mL, 3-10 mL, Intravenous, PRN, Justine Bueno APRN    aspirin 81 mg Oral Daily   doxycycline 100 mg Oral Q12H   fentaNYL citrate (PF)      insulin lispro 0-7 Units Subcutaneous 4x Daily With Meals & Nightly   methohexital      midazolam      nicotine 1 patch Transdermal Q24H   pantoprazole 40 mg Oral QAM   pharmacy consult - MTM  Does not apply Daily   sodium chloride 3 mL Intravenous Q12H         Assessment/Plan   Assessment / Plan     Active Hospital Problems    Diagnosis POA   • **Atrial fibrillation with RVR (CMS/HCC) [I48.91] Yes   • History of esophageal cancer [Z85.01] Yes   • Type 2 diabetes mellitus (CMS/HCC) [E11.9] Yes   • COPD (chronic obstructive pulmonary disease) (CMS/HCC) [J44.9] Yes   • GERD without esophagitis [K21.9] Yes   • Elevated lactic acid level [R79.89] Yes   • Acute CHF (CMS/HCC) [I50.9] Yes   • Hyponatremia [E87.1] Yes   • Bilateral pleural effusion [J90] Yes          Brief Hospital Course to date:  Francesco Arroyo is a 73 y.o. male presents with LE edema, hyponatremia,  and afib RVR    Atrial fibrillation with RVR  - cardioversion attempt today - currently getting amiodarone load  - IV heparin  - replace K and mag  - possible repeat cardioversion tomorrow    Acute CHF, systolic  - EF 30%  - lasix prn  - LV function may improve with rate control and possible conversion to sinus rhythm    Hyponatremia  - improved    DMII  - a1c 7.7    COPD    Bronchitis  - complete course of doxycycline     Tobacco abuse  - counseled cessation      DVT Prophylaxis:  heparin    Disposition: I expect the patient to be discharged home in 1-3 days    CODE STATUS:   Code Status and Medical Interventions:   Ordered at: 04/30/19 2041     Code Status:    CPR     Medical Interventions (Level of Support Prior to Arrest):    Full         Electronically signed by Rodrigo Palacios MD, 05/01/19, 5:08 PM.

## 2019-05-01 NOTE — PLAN OF CARE
Problem: Patient Care Overview  Goal: Plan of Care Review  Outcome: Ongoing (interventions implemented as appropriate)   05/01/19 6904   Coping/Psychosocial   Plan of Care Reviewed With patient;daughter   Plan of Care Review   Progress improving   OTHER   Outcome Summary Pt in afib currently rate currently in low 100s after IV digoxin given. Pt went for LENKA with ECV which was unsuccessful. Heparin gtt at 16u/kg/hr. Amio bolus given and currently on gtt. NPO after MN again for repeat ECV. No complaints currently. VSS on RA. Will continue to monitor.

## 2019-05-01 NOTE — PROCEDURES
PRE-ELECTROPHYSIOLOGY STUDY DIAGNOSIS: Atrial fibrillation.    PROCEDURE PERFORMED: External electrical cardioversion.    Anesthesia: Sedation with:  1. 4 mg Versed  2. 50 mg Brevital    Estimated Blood Loss: Less than 10 mL     Specimens: None    PROCEDURE IN DETAIL: The patient was brought into the [facility] in a fasting  state. The patient was given moderate sedation during which he received 200  joules of external electrical cardioversion x2  that failed to convert atrial flutter   to normal sinus heart rhythm.  LENKA was performed prior to cardioversion there was no evidence of left atrial or left atrial appendage thrombus.    IMPRESSION: Unsuccessful cardioversion, the patient remains in atrial flutter.  We will continue anticoagulation with a heparin drip.  We will load the patient on amiodarone and then reattempt cardioversion

## 2019-05-02 ENCOUNTER — APPOINTMENT (OUTPATIENT)
Dept: PULMONOLOGY | Facility: HOSPITAL | Age: 74
End: 2019-05-02

## 2019-05-02 ENCOUNTER — APPOINTMENT (OUTPATIENT)
Dept: CT IMAGING | Facility: HOSPITAL | Age: 74
End: 2019-05-02

## 2019-05-02 ENCOUNTER — APPOINTMENT (OUTPATIENT)
Dept: CARDIOLOGY | Facility: HOSPITAL | Age: 74
End: 2019-05-02

## 2019-05-02 PROBLEM — I25.119 CORONARY ARTERY DISEASE INVOLVING NATIVE HEART WITH ANGINA PECTORIS (HCC): Status: ACTIVE | Noted: 2019-04-30

## 2019-05-02 PROBLEM — I31.39 PERICARDIAL EFFUSION (NONINFLAMMATORY): Status: ACTIVE | Noted: 2019-04-30

## 2019-05-02 PROBLEM — I51.9 LEFT VENTRICULAR DYSFUNCTION: Status: ACTIVE | Noted: 2019-04-30

## 2019-05-02 PROBLEM — I50.23 ACUTE ON CHRONIC SYSTOLIC CHF (CONGESTIVE HEART FAILURE): Status: ACTIVE | Noted: 2019-04-30

## 2019-05-02 LAB
ABO GROUP BLD: NORMAL
ABO GROUP BLD: NORMAL
AMPHET+METHAMPHET UR QL: NEGATIVE
AMPHETAMINES UR QL: NEGATIVE
ANION GAP SERPL CALCULATED.3IONS-SCNC: 14 MMOL/L
ARTERIAL PATENCY WRIST A: ABNORMAL
ATMOSPHERIC PRESS: ABNORMAL MMHG
BARBITURATES UR QL SCN: NEGATIVE
BASE EXCESS BLDA CALC-SCNC: 6.9 MMOL/L (ref 0–2)
BDY SITE: ABNORMAL
BENZODIAZ UR QL SCN: NEGATIVE
BH CV ECHO MEAS - BSA(HAYCOCK): 1.8 M^2
BH CV ECHO MEAS - BSA: 1.7 M^2
BH CV ECHO MEAS - BZI_BMI: 23.4 KILOGRAMS/M^2
BH CV ECHO MEAS - BZI_METRIC_HEIGHT: 167.6 CM
BH CV ECHO MEAS - BZI_METRIC_WEIGHT: 65.8 KG
BH CV XLRA MEAS LEFT DIST CCA EDV: 13.5 CM/SEC
BH CV XLRA MEAS LEFT DIST CCA PSV: 53.3 CM/SEC
BH CV XLRA MEAS LEFT DIST ICA EDV: 17.7 CM/SEC
BH CV XLRA MEAS LEFT DIST ICA PSV: 70.2 CM/SEC
BH CV XLRA MEAS LEFT ICA/CCA RATIO: 1.4
BH CV XLRA MEAS LEFT MID CCA EDV: 11.3 CM/SEC
BH CV XLRA MEAS LEFT MID CCA PSV: 79.8 CM/SEC
BH CV XLRA MEAS LEFT MID ICA EDV: 20.6 CM/SEC
BH CV XLRA MEAS LEFT MID ICA PSV: 74.2 CM/SEC
BH CV XLRA MEAS LEFT PROX CCA EDV: 11.9 CM/SEC
BH CV XLRA MEAS LEFT PROX CCA PSV: 80.5 CM/SEC
BH CV XLRA MEAS LEFT PROX ECA EDV: 0 CM/SEC
BH CV XLRA MEAS LEFT PROX ECA PSV: 75.5 CM/SEC
BH CV XLRA MEAS LEFT PROX ICA EDV: 10.4 CM/SEC
BH CV XLRA MEAS LEFT PROX ICA PSV: 42.1 CM/SEC
BH CV XLRA MEAS LEFT PROX SCLA EDV: 0 CM/SEC
BH CV XLRA MEAS LEFT PROX SCLA PSV: 114.4 CM/SEC
BH CV XLRA MEAS LEFT VERTEBRAL A EDV: 10.1 CM/SEC
BH CV XLRA MEAS LEFT VERTEBRAL A PSV: 42.4 CM/SEC
BH CV XLRA MEAS RIGHT DIST CCA EDV: 12.7 CM/SEC
BH CV XLRA MEAS RIGHT DIST CCA PSV: 76 CM/SEC
BH CV XLRA MEAS RIGHT DIST ICA EDV: 17.6 CM/SEC
BH CV XLRA MEAS RIGHT DIST ICA PSV: 69.5 CM/SEC
BH CV XLRA MEAS RIGHT ICA/CCA RATIO: 0.75
BH CV XLRA MEAS RIGHT MID CCA EDV: 11.4 CM/SEC
BH CV XLRA MEAS RIGHT MID CCA PSV: 72.9 CM/SEC
BH CV XLRA MEAS RIGHT MID ICA EDV: 7.7 CM/SEC
BH CV XLRA MEAS RIGHT MID ICA PSV: 26.7 CM/SEC
BH CV XLRA MEAS RIGHT PROX CCA EDV: 11.4 CM/SEC
BH CV XLRA MEAS RIGHT PROX CCA PSV: 60.7 CM/SEC
BH CV XLRA MEAS RIGHT PROX ECA EDV: 0 CM/SEC
BH CV XLRA MEAS RIGHT PROX ECA PSV: 106.8 CM/SEC
BH CV XLRA MEAS RIGHT PROX ICA EDV: 10.9 CM/SEC
BH CV XLRA MEAS RIGHT PROX ICA PSV: 56.8 CM/SEC
BH CV XLRA MEAS RIGHT PROX SCLA EDV: 0 CM/SEC
BH CV XLRA MEAS RIGHT PROX SCLA PSV: 181 CM/SEC
BH CV XLRA MEAS RIGHT VERTEBRAL A EDV: 0 CM/SEC
BH CV XLRA MEAS RIGHT VERTEBRAL A PSV: 60.1 CM/SEC
BLD GP AB SCN SERPL QL: NEGATIVE
BODY TEMPERATURE: 37 C
BUN BLD-MCNC: 12 MG/DL (ref 8–23)
BUN/CREAT SERPL: 14.1 (ref 7–25)
BUPRENORPHINE SERPL-MCNC: NEGATIVE NG/ML
CALCIUM SPEC-SCNC: 8.7 MG/DL (ref 8.6–10.5)
CANNABINOIDS SERPL QL: NEGATIVE
CHLORIDE SERPL-SCNC: 93 MMOL/L (ref 98–107)
CO2 BLDA-SCNC: 30.4 MMOL/L (ref 23–27)
CO2 SERPL-SCNC: 25 MMOL/L (ref 22–29)
COCAINE UR QL: NEGATIVE
COHGB MFR BLD: 0.8 % (ref 0–2)
CREAT BLD-MCNC: 0.85 MG/DL (ref 0.76–1.27)
GFR SERPL CREATININE-BSD FRML MDRD: 88 ML/MIN/1.73
GLUCOSE BLD-MCNC: 217 MG/DL (ref 65–99)
GLUCOSE BLDC GLUCOMTR-MCNC: 182 MG/DL (ref 70–130)
GLUCOSE BLDC GLUCOMTR-MCNC: 187 MG/DL (ref 70–130)
GLUCOSE BLDC GLUCOMTR-MCNC: 215 MG/DL (ref 70–130)
GLUCOSE BLDC GLUCOMTR-MCNC: 221 MG/DL (ref 70–130)
HCO3 BLDA-SCNC: 29.3 MMOL/L (ref 20–26)
HCT VFR BLD CALC: 39.3 %
HGB BLDA-MCNC: 12.8 G/DL (ref 13.5–17.5)
HOROWITZ INDEX BLD+IHG-RTO: 21 %
MAGNESIUM SERPL-MCNC: 1.8 MG/DL (ref 1.6–2.4)
METHADONE UR QL SCN: NEGATIVE
METHGB BLD QL: 0.7 % (ref 0–1.5)
MODALITY: ABNORMAL
NOTE: ABNORMAL
NT-PROBNP SERPL-MCNC: 1810 PG/ML (ref 5–900)
OPIATES UR QL: NEGATIVE
OXYCODONE UR QL SCN: NEGATIVE
OXYHGB MFR BLDV: 93 % (ref 94–99)
PA ADP PRP-ACNC: 250 PRU
PCO2 BLDA: 33.6 MM HG
PCO2 TEMP ADJ BLD: 33.6 MM HG (ref 35–48)
PCP UR QL SCN: NEGATIVE
PH BLDA: 7.55 PH UNITS (ref 7.35–7.45)
PH, TEMP CORRECTED: 7.55 PH UNITS
PO2 BLDA: 70.5 MM HG (ref 83–108)
PO2 TEMP ADJ BLD: 70.5 MM HG (ref 83–108)
POTASSIUM BLD-SCNC: 3.2 MMOL/L (ref 3.5–5.2)
PROPOXYPH UR QL: NEGATIVE
RH BLD: POSITIVE
RH BLD: POSITIVE
SODIUM BLD-SCNC: 132 MMOL/L (ref 136–145)
T&S EXPIRATION DATE: NORMAL
TRICYCLICS UR QL SCN: NEGATIVE
UFH PPP CHRO-ACNC: 0.27 IU/ML (ref 0.3–0.7)
UFH PPP CHRO-ACNC: 0.35 IU/ML (ref 0.3–0.7)
VENTILATOR MODE: ABNORMAL

## 2019-05-02 PROCEDURE — 25010000002 AMIODARONE IN DEXTROSE 5% 360-4.14 MG/200ML-% SOLUTION: Performed by: INTERNAL MEDICINE

## 2019-05-02 PROCEDURE — 83735 ASSAY OF MAGNESIUM: CPT | Performed by: INTERNAL MEDICINE

## 2019-05-02 PROCEDURE — 25010000002 ENOXAPARIN PER 10 MG: Performed by: INTERNAL MEDICINE

## 2019-05-02 PROCEDURE — 86923 COMPATIBILITY TEST ELECTRIC: CPT

## 2019-05-02 PROCEDURE — 93005 ELECTROCARDIOGRAM TRACING: CPT | Performed by: PHYSICIAN ASSISTANT

## 2019-05-02 PROCEDURE — 99223 1ST HOSP IP/OBS HIGH 75: CPT | Performed by: THORACIC SURGERY (CARDIOTHORACIC VASCULAR SURGERY)

## 2019-05-02 PROCEDURE — 80048 BASIC METABOLIC PNL TOTAL CA: CPT | Performed by: INTERNAL MEDICINE

## 2019-05-02 PROCEDURE — 83880 ASSAY OF NATRIURETIC PEPTIDE: CPT | Performed by: INTERNAL MEDICINE

## 2019-05-02 PROCEDURE — 0 IOPAMIDOL PER 1 ML: Performed by: INTERNAL MEDICINE

## 2019-05-02 PROCEDURE — C1894 INTRO/SHEATH, NON-LASER: HCPCS | Performed by: INTERNAL MEDICINE

## 2019-05-02 PROCEDURE — 93880 EXTRACRANIAL BILAT STUDY: CPT | Performed by: INTERNAL MEDICINE

## 2019-05-02 PROCEDURE — 25010000002 FENTANYL CITRATE (PF) 100 MCG/2ML SOLUTION: Performed by: INTERNAL MEDICINE

## 2019-05-02 PROCEDURE — 94010 BREATHING CAPACITY TEST: CPT | Performed by: INTERNAL MEDICINE

## 2019-05-02 PROCEDURE — 99232 SBSQ HOSP IP/OBS MODERATE 35: CPT | Performed by: INTERNAL MEDICINE

## 2019-05-02 PROCEDURE — 93880 EXTRACRANIAL BILAT STUDY: CPT

## 2019-05-02 PROCEDURE — 82962 GLUCOSE BLOOD TEST: CPT

## 2019-05-02 PROCEDURE — 86900 BLOOD TYPING SEROLOGIC ABO: CPT | Performed by: PHYSICIAN ASSISTANT

## 2019-05-02 PROCEDURE — 86901 BLOOD TYPING SEROLOGIC RH(D): CPT | Performed by: PHYSICIAN ASSISTANT

## 2019-05-02 PROCEDURE — 85576 BLOOD PLATELET AGGREGATION: CPT | Performed by: PHYSICIAN ASSISTANT

## 2019-05-02 PROCEDURE — 80306 DRUG TEST PRSMV INSTRMNT: CPT | Performed by: PHYSICIAN ASSISTANT

## 2019-05-02 PROCEDURE — 93458 L HRT ARTERY/VENTRICLE ANGIO: CPT | Performed by: INTERNAL MEDICINE

## 2019-05-02 PROCEDURE — 25010000002 MIDAZOLAM PER 1 MG: Performed by: INTERNAL MEDICINE

## 2019-05-02 PROCEDURE — 85520 HEPARIN ASSAY: CPT

## 2019-05-02 PROCEDURE — 85520 HEPARIN ASSAY: CPT | Performed by: INTERNAL MEDICINE

## 2019-05-02 PROCEDURE — C1769 GUIDE WIRE: HCPCS | Performed by: INTERNAL MEDICINE

## 2019-05-02 PROCEDURE — 86850 RBC ANTIBODY SCREEN: CPT | Performed by: PHYSICIAN ASSISTANT

## 2019-05-02 PROCEDURE — 4A023N7 MEASUREMENT OF CARDIAC SAMPLING AND PRESSURE, LEFT HEART, PERCUTANEOUS APPROACH: ICD-10-PCS | Performed by: INTERNAL MEDICINE

## 2019-05-02 PROCEDURE — 71275 CT ANGIOGRAPHY CHEST: CPT

## 2019-05-02 PROCEDURE — 82805 BLOOD GASES W/O2 SATURATION: CPT

## 2019-05-02 PROCEDURE — 93010 ELECTROCARDIOGRAM REPORT: CPT | Performed by: INTERNAL MEDICINE

## 2019-05-02 PROCEDURE — 94010 BREATHING CAPACITY TEST: CPT

## 2019-05-02 PROCEDURE — 25010000002 HEPARIN (PORCINE) PER 1000 UNITS: Performed by: INTERNAL MEDICINE

## 2019-05-02 PROCEDURE — 63710000001 INSULIN LISPRO (HUMAN) PER 5 UNITS: Performed by: NURSE PRACTITIONER

## 2019-05-02 PROCEDURE — 36600 WITHDRAWAL OF ARTERIAL BLOOD: CPT

## 2019-05-02 PROCEDURE — B2111ZZ FLUOROSCOPY OF MULTIPLE CORONARY ARTERIES USING LOW OSMOLAR CONTRAST: ICD-10-PCS | Performed by: INTERNAL MEDICINE

## 2019-05-02 PROCEDURE — B2151ZZ FLUOROSCOPY OF LEFT HEART USING LOW OSMOLAR CONTRAST: ICD-10-PCS | Performed by: INTERNAL MEDICINE

## 2019-05-02 RX ORDER — CHOLECALCIFEROL (VITAMIN D3) 125 MCG
5 CAPSULE ORAL NIGHTLY PRN
Status: DISCONTINUED | OUTPATIENT
Start: 2019-05-02 | End: 2019-05-09 | Stop reason: HOSPADM

## 2019-05-02 RX ORDER — SODIUM CHLORIDE 9 MG/ML
INJECTION, SOLUTION INTRAVENOUS CONTINUOUS PRN
Status: COMPLETED | OUTPATIENT
Start: 2019-05-02 | End: 2019-05-02

## 2019-05-02 RX ORDER — LIDOCAINE HYDROCHLORIDE 10 MG/ML
INJECTION, SOLUTION EPIDURAL; INFILTRATION; INTRACAUDAL; PERINEURAL AS NEEDED
Status: DISCONTINUED | OUTPATIENT
Start: 2019-05-02 | End: 2019-05-02 | Stop reason: HOSPADM

## 2019-05-02 RX ORDER — FENTANYL CITRATE 50 UG/ML
INJECTION, SOLUTION INTRAMUSCULAR; INTRAVENOUS AS NEEDED
Status: DISCONTINUED | OUTPATIENT
Start: 2019-05-02 | End: 2019-05-02 | Stop reason: HOSPADM

## 2019-05-02 RX ORDER — MIDAZOLAM HYDROCHLORIDE 1 MG/ML
INJECTION INTRAMUSCULAR; INTRAVENOUS AS NEEDED
Status: DISCONTINUED | OUTPATIENT
Start: 2019-05-02 | End: 2019-05-02 | Stop reason: HOSPADM

## 2019-05-02 RX ORDER — ACETAMINOPHEN 325 MG/1
650 TABLET ORAL EVERY 4 HOURS PRN
Status: DISCONTINUED | OUTPATIENT
Start: 2019-05-02 | End: 2019-05-09 | Stop reason: HOSPADM

## 2019-05-02 RX ORDER — POTASSIUM CHLORIDE 750 MG/1
40 CAPSULE, EXTENDED RELEASE ORAL DAILY
Status: COMPLETED | OUTPATIENT
Start: 2019-05-02 | End: 2019-05-04

## 2019-05-02 RX ADMIN — INSULIN LISPRO 3 UNITS: 100 INJECTION, SOLUTION INTRAVENOUS; SUBCUTANEOUS at 17:54

## 2019-05-02 RX ADMIN — NICOTINE 1 PATCH: 21 PATCH, EXTENDED RELEASE TRANSDERMAL at 08:23

## 2019-05-02 RX ADMIN — IOPAMIDOL 90 ML: 755 INJECTION, SOLUTION INTRAVENOUS at 17:20

## 2019-05-02 RX ADMIN — ASPIRIN 81 MG: 81 TABLET, COATED ORAL at 08:23

## 2019-05-02 RX ADMIN — AMIODARONE HYDROCHLORIDE 0.5 MG/MIN: 1.8 INJECTION, SOLUTION INTRAVENOUS at 08:47

## 2019-05-02 RX ADMIN — SODIUM CHLORIDE, PRESERVATIVE FREE 3 ML: 5 INJECTION INTRAVENOUS at 08:23

## 2019-05-02 RX ADMIN — PANTOPRAZOLE SODIUM 40 MG: 40 TABLET, DELAYED RELEASE ORAL at 05:53

## 2019-05-02 RX ADMIN — POTASSIUM CHLORIDE 40 MEQ: 750 CAPSULE, EXTENDED RELEASE ORAL at 08:32

## 2019-05-02 RX ADMIN — INSULIN LISPRO 2 UNITS: 100 INJECTION, SOLUTION INTRAVENOUS; SUBCUTANEOUS at 08:27

## 2019-05-02 RX ADMIN — ENOXAPARIN SODIUM 40 MG: 40 INJECTION SUBCUTANEOUS at 21:06

## 2019-05-02 RX ADMIN — MAGNESIUM SULFATE HEPTAHYDRATE 4 G: 40 INJECTION, SOLUTION INTRAVENOUS at 08:26

## 2019-05-02 RX ADMIN — INSULIN LISPRO 3 UNITS: 100 INJECTION, SOLUTION INTRAVENOUS; SUBCUTANEOUS at 12:00

## 2019-05-02 NOTE — PROGRESS NOTES
HEPARIN INFUSION  Francesco Arroyo is a  73 y.o. male receiving heparin infusion.     Therapy for: cardiac, new onset afib  Patient Weight: 68 kg  Initial Bolus (Y/N):   yes  Any Bolus (Y/N):   yes        Signs or Symptoms of Bleeding: no      Cardiac or Other (Not VTE)   Initial Bolus: 60 units/kg (Max 4,000 units)  Initial rate: 12 units/kg/hr (Max 1,000 units/hr)   Anti-Xa (IU/mL) Bolus Dose Stop Infusion Rate Change Repeat Anti-Xa      ?0.19 60 units/kg 0 hrs Increase rate by   4 units/kg/hr 6 hrs       0.2 - 0.29  30 units/kg 0 hrs Increase rate by 2 units/kg/hr 6 hrs    0.3 - 0.7 0 0 hrs No change 6 hrs      0.71 - 0.99 0  0 hrs Decrease rate by 2 units/kg/hr 6 hrs            ?1 0 Hold 1 hr Decrease rate by 3 units/kg/hr 6 hrs        Results from last 7 days   Lab Units 05/01/19  0608 04/30/19  1958 04/30/19  1528   INR   --   --  1.17*   HEMOGLOBIN g/dL 12.9* 12.2* 13.3   HEMATOCRIT % 40.2 36.8* 39.8   PLATELETS 10*3/mm3 291 324 312          Date   Time   Anti-Xa Current Rate (Unit/kg/hr) Bolus   (Units) Rate Change   (Unit/kg/hr) New Rate (Unit/kg/hr) Next   Anti-Xa Comments  Pump Check Daily   4-30 1730 0.10 -- 4000 -- 12 0000 New start in er. Spoke w/ Elda    5/1/19 01:12 0.12 12 4000 +4 16 09:00 Discussed w/ nurse   5/1 0901 0.46 16 -- -- 16 1500 D/w Angelica pump weight and rate verified   5/01 16:06 0.3 16 -- -- 16 22:00 Pt currently in CVOU   5.1 2132 0.21 16  +2 18 0600 S/w nurse   5/2 0633 0.35 18 -- -- 18 1200 D/w RN   5/2 1135 0.27 18 --  OFF -- D/w Angelica MCCLAIN heparin turned off at ~1115. Patient currently in CVOU and heparin is still off post cath. Currently unsure of plan going forward. RN to call pharmacy when the patient returns to the floor with plan.                                                                                                                                                                Brittany Schmidt RPH  5/2/2019  3:27 PM

## 2019-05-02 NOTE — CONSULTS
CTS Consult  Francesco Arroyo  [id number]  1945    Patient Care Team:  Alex Velez DO as PCP - General (Family Medicine)    CC: I feel pretty good but I get short of breath easily      Reason for Consult: Two-vessel CAD    HPI: 73-year-old  male being seen at the request of Dr. Lit Layton for evaluation of a large pericardial effusion with early signs of tamponade (per echocardiogram), two-vessel coronary artery disease, and cardiomyopathy of undetermined etiology.  This man has a past history of esophageal carcinoma and underwent radiation, chemotherapy, and ultimate resection 11 years ago.  He has done well since that time and continues in his usual state of health until the past 6 months to a year.  He has noted increased increase in fatigue.  Over the past 3 or 4 weeks he has had shortness of breath with activity and has also noted increased swelling in his lower extremities.  Swelling in his lower extremities has increased dramatically in the past week or 2.  Because of general malaise, shortness of breath, and the previously noted swelling in his lower extremities he presented to the emergency department and was admitted diagnosis of congestive heart failure, atrial fibrillation, and volume overload.  Since admission he has had an extensive work-up.  He has undergone diuresis and is now essentially asymptomatic.  He has a significant past history of COPD, pleural fibrillation, and esophageal carcinoma.  He has not had anginal quality chest pain but does have shortness of breath with activity.  He has not had episodes of chest pressure, diaphoresis, syncope, nausea, or vomiting.      Atrial fibrillation with RVR (CMS/HCC)    History of esophageal cancer    Type 2 diabetes mellitus (CMS/[facility])    COPD (chronic obstructive pulmonary disease) (CMS/[facility])    GERD without esophagitis    Elevated lactic acid level    Acute CHF (CMS/HCC)    Hyponatremia    Bilateral pleural effusion     Pericardial effusion (noninflammatory)    Acute on chronic systolic CHF (congestive heart failure) (CMS/HCC)    Left ventricular dysfunction    Coronary artery disease involving native heart with angina pectoris (CMS/[facility])      Review of Systems:  General: No anorexia, no weight loss, general malaise and weakness.  No fever chills or night sweats.  HEENT: No headaches no visual changes no hearing loss no rhinitis no pharyngitis  Pulmonary: No shortness of breath, no cough, no hemoptysis  Heart: Frequent runs of palpitations,  (+) malaise, (+) shortness of breath, (+) atrial fibrillation, no bradycardia, no syncope.  No anginal quality chest pain.  Gastrointestinal: No nausea, vomiting, diarrhea, or constipation. No acholic stool, no jaundice, frequent epigastric distress (heartburn), eructation  Renal: No dysuria, no frequency, no hematuria.  Skin: No rash, no skin lesions, no skin tumors.  Neurologic: No seizures, no muscle weakness, no sensory deficit, no amaurosis,  Psychiatric: No anxiety, no history of psychosis  Hematologic: No bleeding history, no ease of bruising, no history of blood disorder.  All other systems were reviewed and are negative.    History  Past Medical History:   Diagnosis Date   • Esophageal cancer (CMS/[facility]) 11/28/2016     Past Surgical History:   Procedure Laterality Date   • CARDIAC CATHETERIZATION N/A 5/2/2019    Procedure: Left Heart Cath;  Surgeon: Jayme Castano MD;  Location: [facility] INVASIVE LOCATION;  Service: Cardiovascular   • ESOPHAGUS SURGERY     • OTHER SURGICAL HISTORY  03/31/2008    Resection of esophageal cancer     History reviewed. No pertinent family history.  Social History     Tobacco Use   • Smoking status: Current Every Day Smoker     Years: 0.50     Types: Cigarettes   • Smokeless tobacco: Never Used   Substance Use Topics   • Alcohol use: Yes     Frequency: Never     Comment: occ   • Drug use: No     Medications Prior to Admission   Medication Sig Dispense  Refill Last Dose   • aspirin 81 MG EC tablet Take 81 mg by mouth Daily.      • [] doxycycline (VIBRAMYCIN) 100 MG capsule Take 100 mg by mouth 2 (Two) Times a Day.      • furosemide (LASIX) 20 MG tablet Take 20 mg by mouth Daily.      • glyBURIDE-metFORMIN (GLUCOVANCE) 5-500 MG per tablet Take 1 tablet by mouth 2 (Two) Times a Day With Meals.      • HYDROcodone-acetaminophen (NORCO) 5-325 MG per tablet Take 1 tablet by mouth Every 8 (Eight) Hours As Needed.      • albuterol (PROVENTIL) (2.5 MG/3ML) 0.083% nebulizer solution USE CONTENTS OF 1 VIAL VIA NEBULIZER EVERY 6 HOURS  0 Taking   • esomeprazole (nexIUM) 40 MG capsule TAKE ONE CAPSULE BY MOUTH EVERY DAY  3 Taking     Allergies:  Patient has no known allergies.    Objective    Vital Signs  Temp:  [97.8 °F (36.6 °C)-98.8 °F (37.1 °C)] 98.3 °F (36.8 °C)  Heart Rate:  [] 102  Resp:  [18-20] 20  BP: (100-133)/(69-92) 103/69    Physical Exam:  General Appearance: alert, appears stated age and cooperative  Head: normocephalic, without obvious abnormality and atraumatic  Ears/Nose: no rhinitis, external ears normal  Throat:  no oral lesions, no pharyngitis  Neck: no adenopathy, suppple, trachea midline, no thyromegaly, no carotid bruit and no JVD  Lungs: clear to auscultation, respirations regular, respirations even and respirations unlabored  Heart: regular rhythm & normal rate, normal S1, S2, no murmur  Abdomen: normal bowel sounds, no masses, soft, non-tender, well-healed midline abdominal incision and right thoracotomy incision  Extremities: moves extremities well and no edema  Pulses: pulses palpable and equal bilaterally (femoral, DP, PT)  Skin: no bleeding, bruising or rash  Neurologic: mental status orientated to person, place, time and situation, CN intact, no motor or sensory loss          Data Review:  Results from last 7 days   Lab Units 19  0608   WBC 10*3/mm3 8.22   HEMOGLOBIN g/dL 12.9*   HEMATOCRIT % 40.2   PLATELETS 10*3/mm3 291      Results from last 7 days   Lab Units 05/02/19  0633   SODIUM mmol/L 132*   POTASSIUM mmol/L 3.2*   CHLORIDE mmol/L 93*   CO2 mmol/L 25.0   BUN mg/dL 12   CREATININE mg/dL 0.85   GLUCOSE mg/dL 217*   CALCIUM mg/dL 8.7     Coagulation:   Lab Results   Component Value Date    INR 1.17 (H) 04/30/2019     Cardiac markers:   Results from last 7 days   Lab Units 05/01/19  0608   TROPONIN T ng/mL <0.010     ABGs:   Results from last 7 days   Lab Units 05/02/19  0925   BASE EXCESS ART mmol/L 6.9*         Imaging Results (last 72 hours)     Procedure Component Value Units Date/Time    XR Chest 1 View [[id number]] Collected:  04/30/19 1635     Updated:  04/30/19 1859    Narrative:       EXAMINATION: XR CHEST 1 VW-04/30/2019:      INDICATION: SOA, triage protocol.      COMPARISON: NONE.     FINDINGS: Cardiac size borderline enlarged. Chronic lung changes without  focal opacification or consolidation. Probable trace bilateral pleural  effusions with mild interstitial prominence. No pneumothorax.           Impression:       Borderline cardiomegaly with trace bilateral pleural  effusions and chronic lung changes.     D:  04/30/2019  E:  04/30/2019        This report was finalized on 4/30/2019 6:56 PM by Dr. Yared Lemus.               Imaging: Chest x-ray, CT scan of the abdomen, transthoracic echocardiogram, transesophageal echocardiogram, and cardiac catheterization reviewed.  CT scan of the chest is pending at the time of this dictation.  Pulmonary function tests have been done but are not available.    Assessment:  Coronary artery disease with high-grade LAD stenosis  Cardiomyopathy (ischemic versus nonischemic)  COPD (severe)  Mediastinal radiation for esophageal carcinoma (remote)  Chronic atrial fibrillation        Plan:  This gentleman will require this gentleman will require a thorough work-up in anticipation of possible pericardiectomy and coronary artery bypass surgery.  Distal coronary disease, decreased ejection  fraction, possible pericardial constriction secondary to radiation therapy, severe COPD portend significant elevation risk factors for morbidity or mortality.  I have discussed this in detail with the patient and with his wife.  Further work-up will be accomplished prior to any contemplated surgery.  Patient is aware of potential surgical risks including death, infection, bleeding, stroke, heart attack, etc. he states that if surgery is necessary he is ready to proceed. I have reviewed, verified, and confirmed the above history and current status.  I have examined the patient and confirmed the above physical findings.Above plan and treatment regimen discussed in detail with patient.  Options of treatment, attendant risks vs benefits, and my recommendations were discussed and all questions answered.      Zaid Pearson MD  05/02/19  4:40 PM

## 2019-05-02 NOTE — PLAN OF CARE
Problem: Patient Care Overview  Goal: Plan of Care Review  Outcome: Ongoing (interventions implemented as appropriate)   05/02/19 9715   Coping/Psychosocial   Plan of Care Reviewed With patient;spouse   Plan of Care Review   Progress no change   OTHER   Outcome Summary Pt had heart cath today which indicated need for CABGx2. CTSx following. Pericardial window and CABG scheduled for possibly next week. Amio gtt continues for now per Dr. Avitia. Pt NPO after MN for now per cards. Mag replaced. VSS. Will continue to monitor.

## 2019-05-02 NOTE — NURSING NOTE
Patient Name:  Francesco Arroyo  :  1945  DOS:  19    Active Hospital Problems    Diagnosis   • **Atrial fibrillation with RVR (CMS/HCC)   • History of esophageal cancer   • Type 2 diabetes mellitus (CMS/HCC)   • COPD (chronic obstructive pulmonary disease) (CMS/HCC)   • GERD without esophagitis   • Elevated lactic acid level   • Acute CHF (CMS/HCC)   • Hyponatremia   • Bilateral pleural effusion       Consult has been received.  Medical records have been reviewed. Patient with history of DM, COPD, esophageal cancer, GERD, tobacco abuse admitted with afib with RVR and acute CHF. Patient is a good candidate for the [facility].  Education provided.  Education time 20 min.  Patient to be scheduled follow up 1 week post discharge.    Echo Results:  LENKA  · Left ventricular systolic function is severely decreased. Estimated EF appears to be in the range of 21 - 25%.  · There is left ventricular global hypokinesis noted.  · Left atrial cavity size is mildly dilated. There is no spontaneous echo contrast present. The left atrial appendage was visualized through multiple planes. Left atrial appendage was found to be singularly lobar in nature. Doppler interrogation shows normal flow within the left atrial appendage. No evidence of a left atrial appendage thrombus was present  · There is moderate (grade 4) plaque in the aortic arch present.  · There is a moderate (1-2cm) pericardial effusion adjacent to the right ventricle adjacent to the right atrium. There is right atrial free wall collapse present intermittently. No right ventricular collapse.  TTE  · The left ventricular cavity is mild-to-moderately dilated.  · There is mild calcification of the aortic valve mainly affecting the non, left and right coronary cusp(s).  · Left ventricular diastolic dysfunction (grade I a) consistent with impaired relaxation.  · Adjacent to the right ventricle and adjacent to the right atrium.  · Estimated EF =  30%.  · There is a moderate size pericardial effusion with mild right atrial and mild right ventricular diastolic collapse.  · Of note the history as the patient has A. fib with RVR. However there are E and A waves present on the tracing so this may be atrial flutter as opposed to A. fib  · There is moderate left ventricular enlargement and global LV hypokinesis  NYHA Class: III    Heart Failure Quality Measures    ACE I, ARB, ARNI (if EF <40%)     If no contraindications:  hypotension    Evidence-based Beta Blocker (EF<40%)    (Bisoprolol, Carvedilol, Metoprolol Succinate)  If no contraindications:  Hypotension, considering once rates better controlled, BP stable and pulmonary evaluation    Aldosterone Antagonist (EF <40%)  If no contraindications:  Hypotension    Heart Failure Education    Medications management and adherance, Low Na diet, Signs / symptoms, Daily weight monitoring, Role of [facility] and when to call and EF teaching    If EF < 35%  New onset HF    Atrial fibrillation / Atrial flutter:  Quality Measure    CHADS-VASc Score:  CHADS-VASc Risk Assessment            3       Total Score        1 CHF    1 DM    1 Age 65-74            Anticoagulation for CHADS-VASc >/=2    If no contraindications:  Heparin    Statin Therapy (for patients with a history of CAD, CVA/TIA, PVD, DM)  If no contraindications:  Patient recently trialed on unknown statin and thought he was intolerant but symptoms likely secondary to acute CHF. Will need to start statin once symptoms stabilize    Atrial fibrillation / Atrial flutter education:   Signs / symptoms, Stroke prevention and Role of [facility] and when to call

## 2019-05-02 NOTE — PROGRESS NOTES
Continued Stay Note  [facility]     Patient Name: Francesco Arroyo  MRN: [id number]  Today's Date: 5/2/2019    Admit Date: 4/30/2019    Discharge Plan     Row Name 05/02/19 1418       Plan    Plan  HOME    Patient/Family in Agreement with Plan  yes    Plan Comments  Met with pt prior to procedure today.  His goal is to return home upon DC.  No immediate needs identified/voiced.  CM will cont to follow hospital course and assist with DCP as appropriate.      Final Discharge Disposition Code  30 - still a patient        Discharge Codes    No documentation.       Expected Discharge Date and Time     Expected Discharge Date Expected Discharge Time    May 4, 2019             Julieta Mckenna

## 2019-05-02 NOTE — PROGRESS NOTES
[facility] Medicine Services  PROGRESS NOTE    Patient Name: Francesco Arroyo  : 1945  MRN: [id number]    Date of Admission: 2019  Length of Stay: 2  Primary Care Physician: Alex Velez DO    Subjective   Subjective     CC:  Atrial fibrillation    HPI:  Tired after procedures today. Denies chest pain. Feels hungry, no nausea.    Review of Systems  Gen- No fevers, chills  CV- No chest pain, + palpitations  Resp- No cough, dyspnea  GI- No N/V/D, abd pain      Otherwise ROS is negative except as mentioned in the HPI.    Objective   Objective     Vital Signs:   Temp:  [97.8 °F (36.6 °C)-98.8 °F (37.1 °C)] 98.3 °F (36.8 °C)  Heart Rate:  [] 109  Resp:  [18-20] 20  BP: (100-133)/() 125/81        Physical Exam:  Constitutional -thin male, a bit cachetic, in bed  HEENT-NCAT, mucous membranes moist  CV-irregularly irreg, no mumur  Resp-grossly clear bilaterally  Abd-soft, non-tender, non-distended, normo active bowel sounds  Ext-No lower extremity cyanosis, clubbing or edema bilaterally  Neuro-alert and oriented, speech clear, moves all extremities   Psych-normal affect  Skin- No rash on exposed UE or LE bilaterally        Results Reviewed:  I have personally reviewed current lab, radiology, and data and agree.    Results from last 7 days   Lab Units 19  0619  1528   WBC 10*3/mm3 8.22 9.86 10.71   HEMOGLOBIN g/dL 12.9* 12.2* 13.3   HEMATOCRIT % 40.2 36.8* 39.8   PLATELETS 10*3/mm3 291 324 312   INR   --   --  1.17*     Results from last 7 days   Lab Units 19  0633 19  0608 19  1528   SODIUM mmol/L 132* 136 133*   POTASSIUM mmol/L 3.2* 4.0 3.9   CHLORIDE mmol/L 93* 93* 89*   CO2 mmol/L 25.0 29.0 27.0   BUN mg/dL 12 11 13   CREATININE mg/dL 0.85 0.83 0.85   GLUCOSE mg/dL 217* 151* 137*   CALCIUM mg/dL 8.7 9.1 9.3   ALT (SGPT) U/L  --   --  12   AST (SGOT) U/L  --   --  16     Estimated Creatinine Clearance: 71 mL/min  (by C-G formula based on SCr of 0.85 mg/dL).    No results found for: BNP    Microbiology Results Abnormal     None          Imaging Results (last 24 hours)     Procedure Component Value Units Date/Time    CT Angiogram Chest With & Without Contrast [[id number]] Collected:  05/02/19 1757     Updated:  05/02/19 1757    Narrative:       EXAMINATION: CT ANGIOGRAM CHEST W/WO CONTRAST - 05/02/2019      INDICATION: Pericarditis.     TECHNIQUE: Spiral acquisition 3 mm post-IV contrast images through the  chest with coronal and sagittal 2D reconstructions.     The radiation dose reduction device was turned on for each scan per the  ALARA (As Low as Reasonably Achievable) protocol.     COMPARISON: 11/29/2012 CT scan.     FINDINGS: History indicates pericarditis, cardiomyopathy, pericardial  effusion.     Note is made of patient's gastric pull-up. There are shotty mediastinal  lymph nodes which are unchanged from 2012. There is a small to moderate  pericardial effusion. There is good contrast opacification of the  pulmonary arteries. No filling defects are seen to suggest pulmonary  embolic disease.     Lung window images show small symmetric pleural effusions and mild right  basilar bullous change. There is minimal associated atelectasis.     Included images of the upper abdomen show prominent left liver lobe and  slightly nodular liver capsule, equivocal for cirrhosis. The spleen is  not enlarged. Dense material in the gallbladder could represent  gallbladder sludge or vicarious excretion of contrast. Pancreas appears  atrophic and contains multiple calcifications consistent with chronic  pancreatitis. No acute pancreatitis is seen. Adrenal glands appear  unremarkable. Left kidney is not identified. Right kidney appears  hyperplastic.       Impression:       1. Small to moderate pericardial effusion.  2. Small bilateral pleural effusions.  3. Stable shotty mediastinal lymph nodes since 2012.  4. Changes of chronic  "pancreatitis. No evidence of acute pancreatitis.  5. Normal variant contrast excretion into the gallbladder versus  gallbladder \"sludge.\"  6. No evidence of pulmonary embolus or other acute disease elsewhere.     DICTATED:   2019  EDITED/ls :   2019              Results for orders placed during the hospital encounter of 19   Adult Transesophageal Echo (LENKA) W/ Cont if Necessary Per Protocol    Narrative · Left ventricular systolic function is severely decreased. Estimated EF   appears to be in the range of 21 - 25%.  · There is left ventricular global hypokinesis noted.  · Left atrial cavity size is mildly dilated. There is no spontaneous echo   contrast present. The left atrial appendage was visualized through   multiple planes. Left atrial appendage was found to be singularly lobar in   nature. Doppler interrogation shows normal flow within the left atrial   appendage. No evidence of a left atrial appendage thrombus was present  · There is moderate (grade 4) plaque in the aortic arch present.  · There is a moderate (1-2cm) pericardial effusion adjacent to the right   ventricle adjacent to the right atrium. There is right atrial free wall   collapse present intermittently. No right ventricular collapse.          I have reviewed the medications:    Current Facility-Administered Medications:   •  acetaminophen (TYLENOL) tablet 650 mg, 650 mg, Oral, Q6H PRN, Justine Bueno, APRN  •  acetaminophen (TYLENOL) tablet 650 mg, 650 mg, Oral, Q4H PRN, Jayme Castano MD  •  [COMPLETED] amiodarone in dextrose 5% (NEXTERONE) loading dose 150mg/100mL, 150 mg, Intravenous, Once, 150 mg at 19 1510 **FOLLOWED BY** [] amiodarone (NEXTERONE) 360 mg/200 mL (1.8 mg/mL) infusion, 1 mg/min, Intravenous, Continuous, Last Rate: 33.3 mL/hr at 19 1517, 1 mg/min at 19 1517 **FOLLOWED BY** amiodarone (NEXTERONE) 360 mg/200 mL (1.8 mg/mL) infusion, 0.5 mg/min, Intravenous, Continuous, [place], " Juan Carlos Mota MD, Last Rate: 16.67 mL/hr at 05/02/19 0847, 0.5 mg/min at 05/02/19 0847  •  aspirin EC tablet 81 mg, 81 mg, Oral, Daily, Justine Bueno APRN, 81 mg at 05/02/19 0823  •  dextrose (D50W) 25 g/ 50mL Intravenous Solution 25 g, 25 g, Intravenous, Q15 Min PRN, Justine Bueno APRN  •  dextrose (GLUTOSE) oral gel 15 g, 15 g, Oral, Q15 Min PRN, Justine Bueno APRN  •  diltiaZEM (CARDIZEM) 125 mg in sodium chloride 0.9 % 125 mL (1 mg/mL) infusion, 5-15 mg/hr, Intravenous, Titrated, Wilmar Gross PA, Stopped at 04/30/19 1953  •  enoxaparin (LOVENOX) syringe 40 mg, 40 mg, Subcutaneous, Once, Piyush Avitia MD  •  fentaNYL citrate (PF) (SUBLIMAZE) 100 MCG/2ML injection  - ADS Override Pull, , , ,   •  glucagon (human recombinant) (GLUCAGEN DIAGNOSTIC) injection 1 mg, 1 mg, Subcutaneous, PRN, Justine Bueno APRN  •  insulin lispro (humaLOG) injection 0-7 Units, 0-7 Units, Subcutaneous, 4x Daily With Meals & Nightly, Justine Bueno APRN, 3 Units at 05/02/19 1754  •  ipratropium-albuterol (DUO-NEB) nebulizer solution 3 mL, 3 mL, Nebulization, Q4H PRN, Justine Bueno APRN  •  Magnesium Sulfate 2 gram Bolus, followed by 8 gram infusion (total Mg dose 10 grams)- Mg less than or equal to 1mg/dL, 2 g, Intravenous, PRN **OR** Magnesium Sulfate 2 gram / 50mL Infusion (GIVE X 3 BAGS TO EQUAL 6GM TOTAL DOSE) - Mg 1.1 - 1.5 mg/dl, 2 g, Intravenous, PRN **OR** Magnesium Sulfate 4 gram infusion- Mg 1.6-1.9 mg/dL, 4 g, Intravenous, PRN, Rodrigo Palacios MD, Last Rate: 25 mL/hr at 05/02/19 0826, 4 g at 05/02/19 0826  •  melatonin tablet 5 mg, 5 mg, Oral, Nightly PRN, Rodrigo Palacios MD  •  methohexital (BREVITAL) 50 MG/5ML injection solution prefilled syringe  - ADS Override Pull, , , ,   •  midazolam (VERSED) 2 MG/2ML injection  - ADS Override Pull, , , ,   •  nicotine (NICODERM CQ) 21 MG/24HR patch 1 patch, 1 patch, Transdermal, Q24H, Justine Bueno, APRN, 1 patch at 05/02/19 0823  •   pantoprazole (PROTONIX) EC tablet 40 mg, 40 mg, Oral, QAM, Justine Bueno APRN, 40 mg at 05/02/19 0553  •  Pharmacy Consult - MTM, , Does not apply, Daily, Brittany Schmidt, [facility]  •  Pharmacy to Dose Heparin, , Does not apply, Continuous PRN, Nury Tariq, [facility]  •  potassium chloride (MICRO-K) CR capsule 40 mEq, 40 mEq, Oral, Daily, Piyush Avitia MD, 40 mEq at 05/02/19 0832  •  sodium chloride 0.9 % flush 10 mL, 10 mL, Intravenous, PRN, Jamie Colon MD  •  sodium chloride 0.9 % flush 3 mL, 3 mL, Intravenous, Q12H, Justine Bueno APRN, 3 mL at 05/02/19 0823  •  sodium chloride 0.9 % flush 3-10 mL, 3-10 mL, Intravenous, PRN, Justine Bueno APRN    aspirin 81 mg Oral Daily   enoxaparin 40 mg Subcutaneous Once   fentaNYL citrate (PF)      insulin lispro 0-7 Units Subcutaneous 4x Daily With Meals & Nightly   methohexital      midazolam      nicotine 1 patch Transdermal Q24H   pantoprazole 40 mg Oral QAM   pharmacy consult - MTM  Does not apply Daily   potassium chloride 40 mEq Oral Daily   sodium chloride 3 mL Intravenous Q12H         Assessment/Plan   Assessment / Plan     Active Hospital Problems    Diagnosis POA   • **Atrial fibrillation with RVR (CMS/[facility]) [I48.91] Yes   • History of esophageal cancer [Z85.01] Yes   • Type 2 diabetes mellitus (CMS/[facility]) [E11.9] Yes   • COPD (chronic obstructive pulmonary disease) (CMS/[facility]) [J44.9] Yes   • GERD without esophagitis [K21.9] Yes   • Elevated lactic acid level [R79.89] Yes   • Acute CHF (CMS/[facility]) [I50.9] Yes   • Hyponatremia [E87.1] Yes   • Bilateral pleural effusion [J90] Yes   • Pericardial effusion (noninflammatory) [I31.3] Unknown     Added automatically from request for surgery [id number]     • Acute on chronic systolic CHF (congestive heart failure) (CMS/[facility]) [I50.23] Unknown     Added automatically from request for surgery [id number]     • Left ventricular dysfunction [I51.9] Unknown     Added automatically from request for surgery [id number]     •  Coronary artery disease involving native heart with angina pectoris (CMS/[facility]) [I25.119] Unknown     Added automatically from request for surgery [id number]            Brief Hospital Course to date:  Francesco Arroyo is a 73 y.o. male presents with LE edema, hyponatremia, and afib RVR    Pericardial effusion  - possible pericariectomy planned    Multivessel CAD  - CABG evaluation underway    Atrial fibrillation with RVR  - amiodarone and cardizem    Acute CHF, systolic  - EF 25%  - lasix prn    Hyponatremia  - improved    DMII  - a1c 7.7    COPD    Bronchitis  - complete course of doxycycline     Tobacco abuse  - counseled cessation      DVT Prophylaxis: lovenox    Disposition: I expect the patient to be discharged TBD    CODE STATUS:   Code Status and Medical Interventions:   Ordered at: 04/30/19 2041     Code Status:    CPR     Medical Interventions (Level of Support Prior to Arrest):    Full         Electronically signed by Rodrigo Palacios MD, 05/02/19, 6:37 PM.

## 2019-05-02 NOTE — PLAN OF CARE
Problem: Patient Care Overview  Goal: Plan of Care Review  Outcome: Ongoing (interventions implemented as appropriate)   05/02/19 0258   Coping/Psychosocial   Plan of Care Reviewed With patient   Plan of Care Review   Progress improving   OTHER   Outcome Summary Pt. in A fib. Rate 110s-130s. Amio and hep gtt infusing. NPO. Resting well. Will continue to monitor.      Goal: Individualization and Mutuality  Outcome: Ongoing (interventions implemented as appropriate)    Goal: Discharge Needs Assessment  Outcome: Ongoing (interventions implemented as appropriate)    Goal: Interprofessional Rounds/Family Conf  Outcome: Ongoing (interventions implemented as appropriate)      Problem: Fall Risk (Adult)  Goal: Identify Related Risk Factors and Signs and Symptoms  Outcome: Ongoing (interventions implemented as appropriate)    Goal: Absence of Fall  Outcome: Ongoing (interventions implemented as appropriate)      Problem: Skin Injury Risk (Adult)  Goal: Identify Related Risk Factors and Signs and Symptoms  Outcome: Ongoing (interventions implemented as appropriate)    Goal: Skin Health and Integrity  Outcome: Ongoing (interventions implemented as appropriate)

## 2019-05-02 NOTE — PROGRESS NOTES
[facility] at [facility]  IP Progress Note      Chief Complaint/Reason for service: A. fib RVR, LV dysfunction, pericardial effusion    Subjective   Subjective: The patient is awake and alert.  He states his weight is gone down since his been here.  States his breathing is only slightly improved.  He states his breathing is close to baseline.  He states that over the last several months he has significant dyspnea with any activity and does wheeze frequently.  He is never seen a lung doctor.  He has smoked for 60 years    Past medical, surgical, social and family history reviewed in the patient's electronic medical record.    Objective     Vital Sign Min/Max for last 24 hours  Temp  Min: 97.7 °F (36.5 °C)  Max: 98.8 °F (37.1 °C)   BP  Min: 100/71  Max: 169/102   Pulse  Min: 94  Max: 162   Resp  Min: 18  Max: 20   SpO2  Min: 90 %  Max: 99 %   Flow (L/min)  Min: 2  Max: 4      Intake/Output Summary (Last 24 hours) at 2019 0728  Last data filed at 2019 1700  Gross per 24 hour   Intake 360 ml   Output 375 ml   Net -15 ml             Current Facility-Administered Medications:   •  acetaminophen (TYLENOL) tablet 650 mg, 650 mg, Oral, Q6H PRN, Justine Bueno APRN  •  [COMPLETED] amiodarone in dextrose 5% (NEXTERONE) loading dose 150mg/100mL, 150 mg, Intravenous, Once, 150 mg at 19 **FOLLOWED BY** [] amiodarone (NEXTERONE) 360 mg/200 mL (1.8 mg/mL) infusion, 1 mg/min, Intravenous, Continuous, Last Rate: 33.3 mL/hr at 19, 1 mg/min at 19 **FOLLOWED BY** amiodarone (NEXTERONE) 360 mg/200 mL (1.8 mg/mL) infusion, 0.5 mg/min, Intravenous, Continuous, Juan Carlos Coleman MD, Last Rate: 16.67 mL/hr at 19, 0.5 mg/min at 19  •  aspirin EC tablet 81 mg, 81 mg, Oral, Daily, Justine Bueno APRN, 81 mg at 19 0858  •  dextrose (D50W) 25 g/ 50mL Intravenous Solution 25 g, 25 g, Intravenous, Q15 Min PRN, Justine Bueno, [facility]  •   dextrose (GLUTOSE) oral gel 15 g, 15 g, Oral, Q15 Min PRN, Justine Bueno APRN  •  diltiaZEM (CARDIZEM) 125 mg in sodium chloride 0.9 % 125 mL (1 mg/mL) infusion, 5-15 mg/hr, Intravenous, Titrated, Wilmar Gross PA, Stopped at 04/30/19 1953  •  fentaNYL citrate (PF) (SUBLIMAZE) 100 MCG/2ML injection  - ADS Override Pull, , , ,   •  glucagon (human recombinant) (GLUCAGEN DIAGNOSTIC) injection 1 mg, 1 mg, Subcutaneous, PRN, Justine Bueno APRN  •  heparin 33436 units/250 mL (100 units/mL) in 0.45 % NaCl infusion, 18 Units/kg/hr, Intravenous, Titrated, Rodrigo Palacios MD, Last Rate: 12.24 mL/hr at 05/01/19 2316, 18 Units/kg/hr at 05/01/19 2316  •  insulin lispro (humaLOG) injection 0-7 Units, 0-7 Units, Subcutaneous, 4x Daily With Meals & Nightly, Justine Bueno APRN, 6 Units at 05/01/19 2039  •  ipratropium-albuterol (DUO-NEB) nebulizer solution 3 mL, 3 mL, Nebulization, Q4H PRN, Justine Bueno APRN  •  Magnesium Sulfate 2 gram Bolus, followed by 8 gram infusion (total Mg dose 10 grams)- Mg less than or equal to 1mg/dL, 2 g, Intravenous, PRN **OR** Magnesium Sulfate 2 gram / 50mL Infusion (GIVE X 3 BAGS TO EQUAL 6GM TOTAL DOSE) - Mg 1.1 - 1.5 mg/dl, 2 g, Intravenous, PRN **OR** Magnesium Sulfate 4 gram infusion- Mg 1.6-1.9 mg/dL, 4 g, Intravenous, PRN, Rodrigo Palacios MD, Last Rate: 25 mL/hr at 05/01/19 1143, 4 g at 05/01/19 1143  •  methohexital (BREVITAL) 50 MG/5ML injection solution prefilled syringe  - ADS Override Pull, , , ,   •  midazolam (VERSED) 2 MG/2ML injection  - ADS Override Pull, , , ,   •  nicotine (NICODERM CQ) 21 MG/24HR patch 1 patch, 1 patch, Transdermal, Q24H, Justine Bueno APRN, Stopped at 05/01/19 0901  •  pantoprazole (PROTONIX) EC tablet 40 mg, 40 mg, Oral, QAM, Justine Bueno APRN, 40 mg at 05/02/19 0553  •  Pharmacy Consult - MTM, , Does not apply, Daily, Brittany Schmidt, [facility]  •  Pharmacy to Dose Heparin, , Does not apply, Continuous PRN, Nury Tariq, [facility]  •   sodium chloride 0.9 % flush 10 mL, 10 mL, Intravenous, PRN, Jamie Colon MD  •  sodium chloride 0.9 % flush 3 mL, 3 mL, Intravenous, Q12H, Justine Bueno APRN, 3 mL at 05/01/19 2039  •  sodium chloride 0.9 % flush 3-10 mL, 3-10 mL, Intravenous, PRN, Justine Bueno APRN    Physical Exam: General thin white male pleasant heart rate is above 100 and irregular wearing nasal O2 mild resting tachypnea no respiratory distress        HEENT: Appears to be some JVP       Respiratory: Equal bilateral symmetrical expansion with few crackles wheezes posteriorly especially on the right and some rhonchi       Cardiovascular: Tachycardic and irregular without any significant murmur, no lower extremity edema, there is some pitting sacral edema       GI: Positive bowel sounds       Lower Extremities: No lesions       Neuro: Cranial nerves II through XII appear grossly normal he moves all 4 extremities       Skin: Warm and dry with no edema palpation but does have some sacral edema       Psych: Pleasant affect    Results Review: Eyes and nose are equal.  Heart rate is improved overall but still in the high 90s and 100s.    Radiology Results:  Imaging Results (last 72 hours)     Procedure Component Value Units Date/Time    XR Chest 1 View [[id number]] Collected:  04/30/19 1635     Updated:  04/30/19 1859    Narrative:       EXAMINATION: XR CHEST 1 VW-04/30/2019:      INDICATION: SOA, triage protocol.      COMPARISON: NONE.     FINDINGS: Cardiac size borderline enlarged. Chronic lung changes without  focal opacification or consolidation. Probable trace bilateral pleural  effusions with mild interstitial prominence. No pneumothorax.           Impression:       Borderline cardiomegaly with trace bilateral pleural  effusions and chronic lung changes.     D:  04/30/2019  E:  04/30/2019        This report was finalized on 4/30/2019 6:56 PM by Dr. Yared Lemus.             EKG: Sinus rhythm    ECHO: LV DD with EF of 30 to 35%    Tele:  There are frequent runs of tachycardia but there are P waves noted to suggest this may represent a form of MAT.  There is no significant ventricular arrhythmias    Assessment   Assessment/Plan: 1 this patient came in with heart failure symptoms for the last 1 month.  His echo showed LV dysfunction and it was thought that atrial arrhythmias were the cause of the LV dysfunction.  He underwent a LENKA cardioversion yesterday.  Post cardioversion he was still tachycardic.  I reviewed the EKG which looks like there are P waves everywhere to suggest this is an MAT.  Getting a history from him and his wife yesterday it sounds like he does get short of breath pretty easily and does wheeze daily.  He was given IV amiodarone yesterday.. I will consult pulmonary today  2 the patient O2 sat is is good but he continues to have a tacky cardia so his lung disease may be worse than what we have documented.  I will review the case with Dr. Pearson to discuss the significance of the pericardial effusion  Review the EKGs and telemetry with EP  LVAD with dilated LV-the patient needs ischemic evaluation    Piyush Avitia MD  05/02/19  7:28 AM

## 2019-05-03 LAB
ANION GAP SERPL CALCULATED.3IONS-SCNC: 12 MMOL/L
BUN BLD-MCNC: 11 MG/DL (ref 8–23)
BUN/CREAT SERPL: 14.5 (ref 7–25)
CALCIUM SPEC-SCNC: 8.8 MG/DL (ref 8.6–10.5)
CHLORIDE SERPL-SCNC: 95 MMOL/L (ref 98–107)
CHOLEST SERPL-MCNC: 102 MG/DL (ref 0–200)
CO2 SERPL-SCNC: 26 MMOL/L (ref 22–29)
CREAT BLD-MCNC: 0.76 MG/DL (ref 0.76–1.27)
DEPRECATED RDW RBC AUTO: 52 FL (ref 37–54)
ERYTHROCYTE [DISTWIDTH] IN BLOOD BY AUTOMATED COUNT: 17.2 % (ref 12.3–15.4)
GFR SERPL CREATININE-BSD FRML MDRD: 100 ML/MIN/1.73
GLUCOSE BLD-MCNC: 205 MG/DL (ref 65–99)
GLUCOSE BLDC GLUCOMTR-MCNC: 189 MG/DL (ref 70–130)
GLUCOSE BLDC GLUCOMTR-MCNC: 205 MG/DL (ref 70–130)
GLUCOSE BLDC GLUCOMTR-MCNC: 237 MG/DL (ref 70–130)
GLUCOSE BLDC GLUCOMTR-MCNC: 299 MG/DL (ref 70–130)
HCT VFR BLD AUTO: 37.2 % (ref 37.5–51)
HDLC SERPL-MCNC: 38 MG/DL (ref 40–60)
HGB BLD-MCNC: 12.1 G/DL (ref 13–17.7)
LDLC SERPL CALC-MCNC: 48 MG/DL (ref 0–100)
LDLC/HDLC SERPL: 1.26 {RATIO}
MAGNESIUM SERPL-MCNC: 2 MG/DL (ref 1.6–2.4)
MCH RBC QN AUTO: 26.7 PG (ref 26.6–33)
MCHC RBC AUTO-ENTMCNC: 32.5 G/DL (ref 31.5–35.7)
MCV RBC AUTO: 82.1 FL (ref 79–97)
PA ADP PRP-ACNC: 266 PRU
PLATELET # BLD AUTO: 254 10*3/MM3 (ref 140–450)
PMV BLD AUTO: 9.1 FL (ref 6–12)
POTASSIUM BLD-SCNC: 3.5 MMOL/L (ref 3.5–5.2)
RBC # BLD AUTO: 4.53 10*6/MM3 (ref 4.14–5.8)
SODIUM BLD-SCNC: 133 MMOL/L (ref 136–145)
TRIGL SERPL-MCNC: 80 MG/DL (ref 0–150)
VLDLC SERPL-MCNC: 16 MG/DL
WBC NRBC COR # BLD: 8.6 10*3/MM3 (ref 3.4–10.8)

## 2019-05-03 PROCEDURE — 85576 BLOOD PLATELET AGGREGATION: CPT | Performed by: PHYSICIAN ASSISTANT

## 2019-05-03 PROCEDURE — 83735 ASSAY OF MAGNESIUM: CPT | Performed by: INTERNAL MEDICINE

## 2019-05-03 PROCEDURE — 80061 LIPID PANEL: CPT | Performed by: PHYSICIAN ASSISTANT

## 2019-05-03 PROCEDURE — 99232 SBSQ HOSP IP/OBS MODERATE 35: CPT | Performed by: INTERNAL MEDICINE

## 2019-05-03 PROCEDURE — 82962 GLUCOSE BLOOD TEST: CPT

## 2019-05-03 PROCEDURE — 25010000002 ENOXAPARIN PER 10 MG: Performed by: INTERNAL MEDICINE

## 2019-05-03 PROCEDURE — 25010000002 FUROSEMIDE PER 20 MG: Performed by: INTERNAL MEDICINE

## 2019-05-03 PROCEDURE — 94640 AIRWAY INHALATION TREATMENT: CPT

## 2019-05-03 PROCEDURE — 85027 COMPLETE CBC AUTOMATED: CPT | Performed by: INTERNAL MEDICINE

## 2019-05-03 PROCEDURE — 80048 BASIC METABOLIC PNL TOTAL CA: CPT | Performed by: INTERNAL MEDICINE

## 2019-05-03 PROCEDURE — 94799 UNLISTED PULMONARY SVC/PX: CPT

## 2019-05-03 PROCEDURE — 99231 SBSQ HOSP IP/OBS SF/LOW 25: CPT | Performed by: THORACIC SURGERY (CARDIOTHORACIC VASCULAR SURGERY)

## 2019-05-03 PROCEDURE — 63710000001 INSULIN LISPRO (HUMAN) PER 5 UNITS: Performed by: NURSE PRACTITIONER

## 2019-05-03 PROCEDURE — 99221 1ST HOSP IP/OBS SF/LOW 40: CPT | Performed by: INTERNAL MEDICINE

## 2019-05-03 RX ORDER — IPRATROPIUM BROMIDE AND ALBUTEROL SULFATE 2.5; .5 MG/3ML; MG/3ML
3 SOLUTION RESPIRATORY (INHALATION)
Status: DISCONTINUED | OUTPATIENT
Start: 2019-05-03 | End: 2019-05-09 | Stop reason: HOSPADM

## 2019-05-03 RX ORDER — POTASSIUM CHLORIDE 750 MG/1
40 CAPSULE, EXTENDED RELEASE ORAL ONCE
Status: DISCONTINUED | OUTPATIENT
Start: 2019-05-03 | End: 2019-05-03

## 2019-05-03 RX ORDER — FUROSEMIDE 10 MG/ML
40 INJECTION INTRAMUSCULAR; INTRAVENOUS DAILY
Status: DISCONTINUED | OUTPATIENT
Start: 2019-05-03 | End: 2019-05-05

## 2019-05-03 RX ORDER — AMIODARONE HYDROCHLORIDE 200 MG/1
400 TABLET ORAL EVERY 12 HOURS SCHEDULED
Status: DISCONTINUED | OUTPATIENT
Start: 2019-05-03 | End: 2019-05-08

## 2019-05-03 RX ADMIN — AMIODARONE HYDROCHLORIDE 400 MG: 200 TABLET ORAL at 21:05

## 2019-05-03 RX ADMIN — MELATONIN TAB 5 MG 5 MG: 5 TAB at 21:05

## 2019-05-03 RX ADMIN — IPRATROPIUM BROMIDE AND ALBUTEROL SULFATE 3 ML: 2.5; .5 SOLUTION RESPIRATORY (INHALATION) at 12:38

## 2019-05-03 RX ADMIN — IPRATROPIUM BROMIDE AND ALBUTEROL SULFATE 3 ML: 2.5; .5 SOLUTION RESPIRATORY (INHALATION) at 16:27

## 2019-05-03 RX ADMIN — INSULIN LISPRO 4 UNITS: 100 INJECTION, SOLUTION INTRAVENOUS; SUBCUTANEOUS at 17:10

## 2019-05-03 RX ADMIN — NICOTINE 1 PATCH: 21 PATCH, EXTENDED RELEASE TRANSDERMAL at 08:45

## 2019-05-03 RX ADMIN — INSULIN LISPRO 3 UNITS: 100 INJECTION, SOLUTION INTRAVENOUS; SUBCUTANEOUS at 08:44

## 2019-05-03 RX ADMIN — ASPIRIN 81 MG: 81 TABLET, COATED ORAL at 08:45

## 2019-05-03 RX ADMIN — SODIUM CHLORIDE, PRESERVATIVE FREE 3 ML: 5 INJECTION INTRAVENOUS at 21:06

## 2019-05-03 RX ADMIN — INSULIN LISPRO 2 UNITS: 100 INJECTION, SOLUTION INTRAVENOUS; SUBCUTANEOUS at 12:07

## 2019-05-03 RX ADMIN — FUROSEMIDE 40 MG: 10 INJECTION, SOLUTION INTRAMUSCULAR; INTRAVENOUS at 08:44

## 2019-05-03 RX ADMIN — MELATONIN TAB 5 MG 5 MG: 5 TAB at 00:07

## 2019-05-03 RX ADMIN — POTASSIUM CHLORIDE 40 MEQ: 750 CAPSULE, EXTENDED RELEASE ORAL at 08:45

## 2019-05-03 RX ADMIN — ENOXAPARIN SODIUM 40 MG: 40 INJECTION SUBCUTANEOUS at 17:10

## 2019-05-03 RX ADMIN — AMIODARONE HYDROCHLORIDE 400 MG: 200 TABLET ORAL at 08:45

## 2019-05-03 RX ADMIN — IPRATROPIUM BROMIDE AND ALBUTEROL SULFATE 3 ML: 2.5; .5 SOLUTION RESPIRATORY (INHALATION) at 19:51

## 2019-05-03 RX ADMIN — SODIUM CHLORIDE, PRESERVATIVE FREE 3 ML: 5 INJECTION INTRAVENOUS at 08:47

## 2019-05-03 RX ADMIN — INSULIN LISPRO 3 UNITS: 100 INJECTION, SOLUTION INTRAVENOUS; SUBCUTANEOUS at 21:18

## 2019-05-03 RX ADMIN — PANTOPRAZOLE SODIUM 40 MG: 40 TABLET, DELAYED RELEASE ORAL at 08:45

## 2019-05-03 RX ADMIN — ACETAMINOPHEN 650 MG: 325 TABLET ORAL at 21:05

## 2019-05-03 NOTE — PROGRESS NOTES
[facility] Medicine Services  PROGRESS NOTE    Patient Name: Francesco Arroyo  : 1945  MRN: [id number]    Date of Admission: 2019  Length of Stay: 3  Primary Care Physician: Alex Velez DO    Subjective   Subjective     CC:  Atrial fibrillation    HPI:  Tired after procedures today. Denies chest pain. Feels hungry, no nausea.    Review of Systems  Gen- No fevers, chills  CV- No chest pain, + palpitations  Resp- No cough, dyspnea  GI- No N/V/D, abd pain      Otherwise ROS is negative except as mentioned in the HPI.    Objective   Objective     Vital Signs:   Temp:  [96.6 °F (35.9 °C)-98.6 °F (37 °C)] 98.3 °F (36.8 °C)  Heart Rate:  [] 96  Resp:  [16-18] 18  BP: (108-128)/(70-92) 108/70        Physical Exam:  Constitutional - nontoxic, thin male, in bed  HEENT-NCAT, mucous membranes moist  CV-RRR, no murmur  Resp-diminished bilaterally  Abd-soft, non-tender, non-distended, normo active bowel sounds  Ext-No lower extremity cyanosis, clubbing but trace edema LE bilat  Neuro-alert and oriented, speech clear, moves all extremities   Psych-normal affect   Skin- No rash on exposed UE or LE bilaterally          Results Reviewed:  I have personally reviewed current lab, radiology, and data and agree.    Results from last 7 days   Lab Units 19  05019  0619  1528   WBC 10*3/mm3 8.60 8.22 9.86 10.71   HEMOGLOBIN g/dL 12.1* 12.9* 12.2* 13.3   HEMATOCRIT % 37.2* 40.2 36.8* 39.8   PLATELETS 10*3/mm3 254 291 324 312   INR   --   --   --  1.17*     Results from last 7 days   Lab Units 19  0501 19  0619  1528   SODIUM mmol/L 133* 132* 136 133*   POTASSIUM mmol/L 3.5 3.2* 4.0 3.9   CHLORIDE mmol/L 95* 93* 93* 89*   CO2 mmol/L 26.0 25.0 29.0 27.0   BUN mg/dL 11 12 11 13   CREATININE mg/dL 0.76 0.85 0.83 0.85   GLUCOSE mg/dL 205* 217* 151* 137*   CALCIUM mg/dL 8.8 8.7 9.1 9.3   ALT (SGPT) U/L  --   --   --   12   AST (SGOT) U/L  --   --   --  16     Estimated Creatinine Clearance: 75.5 mL/min (by C-G formula based on SCr of 0.76 mg/dL).    No results found for: BNP    Microbiology Results Abnormal     None          Imaging Results (last 24 hours)     Procedure Component Value Units Date/Time    CT Angiogram Chest With & Without Contrast [[id number]] Collected:  05/02/19 1757     Updated:  05/03/19 1757    Narrative:       EXAMINATION: CT ANGIOGRAM CHEST W/WO CONTRAST - 05/02/2019      INDICATION: Pericarditis.     TECHNIQUE: Spiral acquisition 3 mm post-IV contrast images through the  chest with coronal and sagittal 2D reconstructions.     The radiation dose reduction device was turned on for each scan per the  ALARA (As Low as Reasonably Achievable) protocol.     COMPARISON: 11/29/2012 CT scan.     FINDINGS: History indicates pericarditis, cardiomyopathy, pericardial  effusion.     Note is made of patient's gastric pull-up. There are shotty mediastinal  lymph nodes which are unchanged from 2012. There is a small to moderate  pericardial effusion. There is good contrast opacification of the  pulmonary arteries. No filling defects are seen to suggest pulmonary  embolic disease.     Lung window images show small symmetric pleural effusions and mild right  basilar bullous change. There is minimal associated atelectasis.     Included images of the upper abdomen show prominent left liver lobe and  slightly nodular liver capsule, equivocal for cirrhosis. The spleen is  not enlarged. Dense material in the gallbladder could represent  gallbladder sludge or vicarious excretion of contrast. Pancreas appears  atrophic and contains multiple calcifications consistent with chronic  pancreatitis. No acute pancreatitis is seen. Adrenal glands appear  unremarkable. Left kidney is not identified. Right kidney appears  hyperplastic.       Impression:       1. Small to moderate pericardial effusion.  2. Small bilateral pleural  "effusions.  3. Stable shotty mediastinal lymph nodes since 2012.  4. Changes of chronic pancreatitis. No evidence of acute pancreatitis.  5. Normal variant contrast excretion into the gallbladder versus  gallbladder \"sludge.\"  6. No evidence of pulmonary embolus or other acute disease elsewhere.     DICTATED:   05/02/2019  EDITED/ls :   05/02/2019      This report was finalized on 5/3/2019 5:53 PM by DR. James Gaspar MD.             Results for orders placed during the hospital encounter of 04/30/19   Adult Transesophageal Echo (LENKA) W/ Cont if Necessary Per Protocol    Narrative · Left ventricular systolic function is severely decreased. Estimated EF   appears to be in the range of 21 - 25%.  · There is left ventricular global hypokinesis noted.  · Left atrial cavity size is mildly dilated. There is no spontaneous echo   contrast present. The left atrial appendage was visualized through   multiple planes. Left atrial appendage was found to be singularly lobar in   nature. Doppler interrogation shows normal flow within the left atrial   appendage. No evidence of a left atrial appendage thrombus was present  · There is moderate (grade 4) plaque in the aortic arch present.  · There is a moderate (1-2cm) pericardial effusion adjacent to the right   ventricle adjacent to the right atrium. There is right atrial free wall   collapse present intermittently. No right ventricular collapse.          I have reviewed the medications:    Current Facility-Administered Medications:   •  acetaminophen (TYLENOL) tablet 650 mg, 650 mg, Oral, Q6H PRN, Justine Bueno, APRN  •  acetaminophen (TYLENOL) tablet 650 mg, 650 mg, Oral, Q4H PRN, Jayme Castano MD  •  amiodarone (PACERONE) tablet 400 mg, 400 mg, Oral, Q12H, Piyush Avitia MD, 400 mg at 05/03/19 0845  •  aspirin EC tablet 81 mg, 81 mg, Oral, Daily, Justine Bueno APRN, 81 mg at 05/03/19 0845  •  dextrose (D50W) 25 g/ 50mL Intravenous Solution 25 g, 25 g, Intravenous, " Q15 Min PRN, Justine Bueno APRN  •  dextrose (GLUTOSE) oral gel 15 g, 15 g, Oral, Q15 Min PRN, Justine Bueno APRN  •  enoxaparin (LOVENOX) syringe 40 mg, 40 mg, Subcutaneous, Q24H, Piyush Avitia MD, 40 mg at 05/03/19 1710  •  fentaNYL citrate (PF) (SUBLIMAZE) 100 MCG/2ML injection  - ADS Override Pull, , , ,   •  furosemide (LASIX) injection 40 mg, 40 mg, Intravenous, Daily, Piyush Avitia MD, 40 mg at 05/03/19 0844  •  glucagon (human recombinant) (GLUCAGEN DIAGNOSTIC) injection 1 mg, 1 mg, Subcutaneous, PRN, Justine Bueno APRN  •  insulin lispro (humaLOG) injection 0-7 Units, 0-7 Units, Subcutaneous, 4x Daily With Meals & Nightly, Justine Bueno APRN, 4 Units at 05/03/19 1710  •  ipratropium-albuterol (DUO-NEB) nebulizer solution 3 mL, 3 mL, Nebulization, Q4H PRN, Justine Bueno APRN  •  ipratropium-albuterol (DUO-NEB) nebulizer solution 3 mL, 3 mL, Nebulization, 4x Daily - RT, Kisha Salinas PA-C, 3 mL at 05/03/19 1627  •  Magnesium Sulfate 2 gram Bolus, followed by 8 gram infusion (total Mg dose 10 grams)- Mg less than or equal to 1mg/dL, 2 g, Intravenous, PRN **OR** Magnesium Sulfate 2 gram / 50mL Infusion (GIVE X 3 BAGS TO EQUAL 6GM TOTAL DOSE) - Mg 1.1 - 1.5 mg/dl, 2 g, Intravenous, PRN **OR** Magnesium Sulfate 4 gram infusion- Mg 1.6-1.9 mg/dL, 4 g, Intravenous, PRN, Rodrigo Palacios MD, Last Rate: 25 mL/hr at 05/02/19 0826, 4 g at 05/02/19 0826  •  melatonin tablet 5 mg, 5 mg, Oral, Nightly PRN, Rodrigo Palacios MD, 5 mg at 05/03/19 0007  •  methohexital (BREVITAL) 50 MG/5ML injection solution prefilled syringe  - ADS Override Pull, , , ,   •  midazolam (VERSED) 2 MG/2ML injection  - ADS Override Pull, , , ,   •  nicotine (NICODERM CQ) 21 MG/24HR patch 1 patch, 1 patch, Transdermal, Q24H, Justine Bueno APRN, 1 patch at 05/03/19 0845  •  pantoprazole (PROTONIX) EC tablet 40 mg, 40 mg, Oral, QAM, Justine Bueno APRN, 40 mg at 05/03/19 0845  •  Pharmacy Consult - [facility],  , Does not apply, Daily, Brittany Schmidt, [facility]  •  Pharmacy to Dose Heparin, , Does not apply, Continuous PRN, Nury Tariq, [facility]  •  potassium chloride (MICRO-K) CR capsule 40 mEq, 40 mEq, Oral, Daily, Piyush Avitia MD, 40 mEq at 05/03/19 0845  •  sodium chloride 0.9 % flush 10 mL, 10 mL, Intravenous, PRN, Jamie Colon MD  •  sodium chloride 0.9 % flush 3 mL, 3 mL, Intravenous, Q12H, Justine Bueno APRN, 3 mL at 05/03/19 0847  •  sodium chloride 0.9 % flush 3-10 mL, 3-10 mL, Intravenous, PRN, Justine Bueno, APRN    amiodarone 400 mg Oral Q12H   aspirin 81 mg Oral Daily   enoxaparin 40 mg Subcutaneous Q24H   fentaNYL citrate (PF)      furosemide 40 mg Intravenous Daily   insulin lispro 0-7 Units Subcutaneous 4x Daily With Meals & Nightly   ipratropium-albuterol 3 mL Nebulization 4x Daily - RT   methohexital      midazolam      nicotine 1 patch Transdermal Q24H   pantoprazole 40 mg Oral QAM   pharmacy consult - [facility]TM  Does not apply Daily   potassium chloride 40 mEq Oral Daily   sodium chloride 3 mL Intravenous Q12H         Assessment/Plan   Assessment / Plan     Active Hospital Problems    Diagnosis POA   • **Atrial fibrillation with RVR (CMS/[facility]) [I48.91] Yes   • History of esophageal cancer [Z85.01] Yes   • Type 2 diabetes mellitus (CMS/[facility]) [E11.9] Yes   • COPD (chronic obstructive pulmonary disease), Severe physiology, FEV1 0.9L (34%) [J44.9] Yes   • GERD without esophagitis [K21.9] Yes   • Elevated lactic acid level [R79.89] Yes   • Acute CHF (CMS/[facility]) [I50.9] Yes   • Hyponatremia [E87.1] Yes   • Bilateral pleural effusion [J90] Yes   • Pericardial effusion (noninflammatory) [I31.3] Unknown     Added automatically from request for surgery [id number]     • Acute on chronic systolic CHF (congestive heart failure) (CMS/[facility]) [I50.23] Unknown     Added automatically from request for surgery [id number]     • Left ventricular dysfunction [I51.9] Unknown     Added automatically from request for surgery  [id number]     • Coronary artery disease involving native heart with angina pectoris (CMS/[facility]) [I25.119] Unknown     Added automatically from request for surgery [id number]            Brief Hospital Course to date:  Francesco Arroyo is a 73 y.o. male presents with LE edema, hyponatremia, and afib RVR    Pericardial effusion  - possible pericariectomy planned    Multivessel CAD  - CABG evaluation underway - possible surgery Monday    Atrial fibrillation with RVR  - amiodarone and cardizem  - replace electrolytes, check K and mag in am    Acute CHF, systolic  - EF 25%  - lasix prn    Hyponatremia  - improved, fluid restrict in anticipation of surgery     DMII  - a1c 7.7    COPD    Bronchitis  - complete course of doxycycline     Tobacco abuse  - counseled cessation      DVT Prophylaxis: lovenox    Disposition: I expect the patient to be discharged TBD    CODE STATUS:   Code Status and Medical Interventions:   Ordered at: 04/30/19 2041     Code Status:    CPR     Medical Interventions (Level of Support Prior to Arrest):    Full         Electronically signed by Rodrigo Palacios MD, 05/03/19, 6:05 PM.

## 2019-05-03 NOTE — CONSULTS
Inpatient Pulmonology Consult  Consult performed by: Brant Adame MD  Consult ordered by: Jayme Castano MD        Pulmonary Medicine Consultation     Patient Care Team:  Alex Velez DO as PCP - General (Family Medicine)    Reason for Consultation: COPD    Subjective   History of Present Illness:  This is a 74-year-old former smoker of many years who presented to the hospital after several weeks of worsened fatigue and dyspnea.  In addition, he noted that despite some diuresis he was beginning to swell in his lower extremities.  He was admitted to the hospital several days ago and was found to be in a rapid atrial rhythm.  Initially it was felt that this represented atrial fibrillation but on further investigation it seems to be multifocal atrial tachycardia.  He does have a history of emphysema but has not seen anyone for this.  At home he does take bronchodilator therapy.  Since he has been here, broncho-dilator therapy has benefited him.  Initially he had been wheezing although states that he is feeling much better now.  His investigation thus far has included an echocardiogram which shows depressed left ventricular ejection fraction of 30% as well as what appears to be a calcified pericardium with pericardial effusion.  He also underwent left heart catheterization which shows multivessel disease.  He also underwent a cardioversion from his atrial arrhythmia.  He is in the process of being prepped for coronary artery bypass grafting as well as probable pericardectomy on Monday per Dr. Pearson.  I was consulted for further investigation of his COPD and maximization in anticipation of his surgery.    Spirometry performed at bedside shows severe COPD with an FEV1 of 0.9 L which is a 4% of predicted.  He states that he has been admitted to the hospital in the past for breathing problems although it has been at least 6 months since his last visit to a healthcare provider for this particular  problem.    I reviewed his laboratory work including an arterial blood gas.  He does not show signs of CO2 retention.    Review of Systems:  Pertinent items are noted in HPI, all other systems reviewed and negative    Past Medical History:  Past Medical History:   Diagnosis Date   • Esophageal cancer (CMS/HCC) 11/28/2016     Past Surgical History:   Procedure Laterality Date   • CARDIAC CATHETERIZATION N/A 5/2/2019    Procedure: Left Heart Cath;  Surgeon: Jayme Castano MD;  Location: [facility] CATH INVASIVE LOCATION;  Service: Cardiovascular   • ESOPHAGUS SURGERY     • OTHER SURGICAL HISTORY  03/31/2008    Resection of esophageal cancer       Allergies:  No Known Allergies    Medications:  No current facility-administered medications on file prior to encounter.      Current Outpatient Medications on File Prior to Encounter   Medication Sig Dispense Refill   • aspirin 81 MG EC tablet Take 81 mg by mouth Daily.     • furosemide (LASIX) 20 MG tablet Take 20 mg by mouth Daily.     • glyBURIDE-metFORMIN (GLUCOVANCE) 5-500 MG per tablet Take 1 tablet by mouth 2 (Two) Times a Day With Meals.     • HYDROcodone-acetaminophen (NORCO) 5-325 MG per tablet Take 1 tablet by mouth Every 8 (Eight) Hours As Needed.     • albuterol (PROVENTIL) (2.5 MG/3ML) 0.083% nebulizer solution USE CONTENTS OF 1 VIAL VIA NEBULIZER EVERY 6 HOURS  0   • esomeprazole (nexIUM) 40 MG capsule TAKE ONE CAPSULE BY MOUTH EVERY DAY  3       Pharmacy to Dose Heparin        amiodarone 400 mg Oral Q12H   aspirin 81 mg Oral Daily   enoxaparin 40 mg Subcutaneous Q24H   fentaNYL citrate (PF)      furosemide 40 mg Intravenous Daily   insulin lispro 0-7 Units Subcutaneous 4x Daily With Meals & Nightly   ipratropium-albuterol 3 mL Nebulization 4x Daily - RT   methohexital      midazolam      nicotine 1 patch Transdermal Q24H   pantoprazole 40 mg Oral QAM   pharmacy consult - MTM  Does not apply Daily   potassium chloride 40 mEq Oral Daily   sodium chloride 3 mL  Intravenous Q12H       Social History:  Social History     Socioeconomic History   • Marital status:      Spouse name: Not on file   • Number of children: Not on file   • Years of education: Not on file   • Highest education level: Not on file   Tobacco Use   • Smoking status: Current Every Day Smoker     Years: 0.50     Types: Cigarettes   • Smokeless tobacco: Never Used   Substance and Sexual Activity   • Alcohol use: Yes     Frequency: Never     Comment: occ   • Drug use: No   • Sexual activity: Defer       Family History:  History reviewed. No pertinent family history.  Family history is reviewed and is not contributory to the case.    Objective   Objective     Physical Exam:  Vitals:    05/03/19 1627   BP:    Pulse: 96   Resp:    Temp:    SpO2:      Physical Exam   Constitutional: He is oriented to person, place, and time. He appears well-developed and well-nourished.   HENT:   Head: Normocephalic and atraumatic.   Mouth/Throat: Oropharynx is clear and moist. No oropharyngeal exudate.   Eyes: EOM are normal. Pupils are equal, round, and reactive to light.   Neck: Normal range of motion. Neck supple. No JVD present. No tracheal deviation present. No thyromegaly present.   Cardiovascular: Normal rate and regular rhythm. Exam reveals no friction rub.   No murmur heard.  Pulmonary/Chest: Effort normal.   Decreased air movement bilaterally.  There are no wheezes, rales, or rhonchi heard.  Chest wall is nontender.   Abdominal: Soft. Bowel sounds are normal. He exhibits no distension and no mass. There is no tenderness. There is no guarding.   Musculoskeletal: Normal range of motion. He exhibits edema.   1+ pitting edema to lower extremities.   Neurological: He is oriented to person, place, and time.   Skin: Skin is warm. Capillary refill takes less than 2 seconds. He is not diaphoretic. No erythema.   Psychiatric: He has a normal mood and affect. His behavior is normal. Thought content normal.       Lab  Results/Imaging/Diagnostics:  Results from last 7 days   Lab Units 05/03/19  0501 05/01/19  0608 04/30/19  1958   WBC 10*3/mm3 8.60 8.22 9.86   HEMOGLOBIN g/dL 12.1* 12.9* 12.2*   PLATELETS 10*3/mm3 254 291 324     Results from last 7 days   Lab Units 05/03/19  0501 05/02/19  0633 05/01/19  0608   SODIUM mmol/L 133* 132* 136   POTASSIUM mmol/L 3.5 3.2* 4.0   CO2 mmol/L 26.0 25.0 29.0   BUN mg/dL 11 12 11   CREATININE mg/dL 0.76 0.85 0.83   MAGNESIUM mg/dL 2.0 1.8 1.6   GLUCOSE mg/dL 205* 217* 151*     Estimated Creatinine Clearance: 75.5 mL/min (by C-G formula based on SCr of 0.76 mg/dL).    Results from last 7 days   Lab Units 05/01/19  0608   HEMOGLOBIN A1C % 7.70*     Results from last 7 days   Lab Units 05/02/19  0925   PH, ARTERIAL pH units 7.549*   PCO2, ARTERIAL mm Hg 33.6   PO2 ART mm Hg 70.5*       Images: Images reviewed as per the HPI.    Assessment/Plan   Impression & Plan     Impression:    Atrial fibrillation with RVR (CMS/[facility])    History of esophageal cancer    Type 2 diabetes mellitus (CMS/[facility])    COPD (chronic obstructive pulmonary disease), Severe physiology, FEV1 0.9L (34%)    GERD without esophagitis    Elevated lactic acid level    Acute CHF (CMS/[facility])    Hyponatremia    Bilateral pleural effusion    Pericardial effusion (noninflammatory)    Acute on chronic systolic CHF (congestive heart failure) (CMS/[facility])    Left ventricular dysfunction    Coronary artery disease involving native heart with angina pectoris (CMS/[facility])      Plan:  The patient has underlying severe COPD of emphysematous type which puts them and it elevated risk of perioperative pulmonary applications including failure to wean from the ventilator, pneumonia, and thromboembolic disease.  I had a long discussion with him regarding what to expect in the aftermath of bypass surgery including pleuritic pain and the need to engage in good pulmonary hygiene to ensure good oxygen exchange.  I have encouraged the use of incentive spirometry  and good pain control.    I would continue on with bronchodilator therapy on an as-needed basis.  Early mobilization would be very important to his care.  Review of his arterial blood gas does not show any sign of CO2 retention.    We will plan to follow him in the intensive care unit in the postoperative setting.    Thank you very much for asking us to help in the care of this very nice patient.  He remains at high risk for worsening secondary to multiple medical problems.    I discussed these findings and my recommendations with patient       Brant Adame MD, [facility]  Pulmonary and Critical Care Medicine  05/03/19 4:28 PM

## 2019-05-03 NOTE — PLAN OF CARE
Problem: Patient Care Overview  Goal: Plan of Care Review  Outcome: Ongoing (interventions implemented as appropriate)   05/03/19 9848   Coping/Psychosocial   Plan of Care Reviewed With patient   Plan of Care Review   Progress no change   OTHER   Outcome Summary Pt with no acute distress overnight. Family at bedside. NPO at midnight. Plan for surgery prep in next few days.

## 2019-05-03 NOTE — PROGRESS NOTES
Continued Stay Note  [facility]     Patient Name: Francesco Arroyo  MRN: [id number]  Today's Date: 5/3/2019    Admit Date: 4/30/2019    Discharge Plan     Row Name 05/03/19 1616       Plan    Plan  Home ATT.    Plan Comments  Met w/ pt. and family at bedside.   His plan remains the same at this time.    He says he is having surgery on Monday and the physician is coming to review details with him later.    CM will follow for needs    Final Discharge Disposition Code  30 - still a patient        Discharge Codes    No documentation.       Expected Discharge Date and Time     Expected Discharge Date Expected Discharge Time    May 4, 2019             Pinky Mckenna RN

## 2019-05-03 NOTE — PROGRESS NOTES
[facility]  IP Progress Note      Chief Complaint/Reason for service: #1 arrhythmia probably MAT #2 systolic heart failure with LVEF 30% #3 pericardial effusion #4 severe CAD #5 pericardial calcification may represent constriction    Subjective   Subjective: The patient states his breathing is improved.  His wife states his lower extremities have gone down now to what they normally look like.  The patient denies chest pain.  He had questions about the potential surgery.    Past medical, surgical, social and family history reviewed in the patient's electronic medical record.    Objective     Vital Sign Min/Max for last 24 hours  Temp  Min: 96.6 °F (35.9 °C)  Max: 98.6 °F (37 °C)   BP  Min: 101/74  Max: 133/78   Pulse  Min: 82  Max: 114   Resp  Min: 16  Max: 20   SpO2  Min: 83 %  Max: 98 %   Flow (L/min)  Min: 2  Max: 2      Intake/Output Summary (Last 24 hours) at 5/3/2019 0728  Last data filed at 5/3/2019 0100  Gross per 24 hour   Intake 1574 ml   Output 550 ml   Net 1024 ml             Current Facility-Administered Medications:   •  acetaminophen (TYLENOL) tablet 650 mg, 650 mg, Oral, Q6H PRN, Justine Bueno, APRN  •  acetaminophen (TYLENOL) tablet 650 mg, 650 mg, Oral, Q4H PRN, Jayme Castano MD  •  [COMPLETED] amiodarone in dextrose 5% (NEXTERONE) loading dose 150mg/100mL, 150 mg, Intravenous, Once, 150 mg at 19 1510 **FOLLOWED BY** [] amiodarone (NEXTERONE) 360 mg/200 mL (1.8 mg/mL) infusion, 1 mg/min, Intravenous, Continuous, Last Rate: 33.3 mL/hr at 19 1517, 1 mg/min at 19 151 **FOLLOWED BY** amiodarone (NEXTERONE) 360 mg/200 mL (1.8 mg/mL) infusion, 0.5 mg/min, Intravenous, Continuous, Juan Carlos Coleman MD, Last Rate: 16.67 mL/hr at 19, 0.5 mg/min at 19  •  aspirin EC tablet 81 mg, 81 mg, Oral, Daily, Justine Bueno, NIMCO, 81 mg at 19 0823  •  dextrose (D50W) 25 g/ 50mL Intravenous Solution 25 g, 25 g,  Intravenous, Q15 Min PRN, Justine Bueno APRN  •  dextrose (GLUTOSE) oral gel 15 g, 15 g, Oral, Q15 Min PRN, Justine Bueno APRN  •  fentaNYL citrate (PF) (SUBLIMAZE) 100 MCG/2ML injection  - ADS Override Pull, , , ,   •  furosemide (LASIX) injection 40 mg, 40 mg, Intravenous, Daily, Piyush Avitia MD  •  glucagon (human recombinant) (GLUCAGEN DIAGNOSTIC) injection 1 mg, 1 mg, Subcutaneous, PRN, Justine Bueno APRN  •  insulin lispro (humaLOG) injection 0-7 Units, 0-7 Units, Subcutaneous, 4x Daily With Meals & Nightly, Justine Buneo APRN, 3 Units at 05/02/19 1754  •  ipratropium-albuterol (DUO-NEB) nebulizer solution 3 mL, 3 mL, Nebulization, Q4H PRN, Justine Bueno APRN  •  Magnesium Sulfate 2 gram Bolus, followed by 8 gram infusion (total Mg dose 10 grams)- Mg less than or equal to 1mg/dL, 2 g, Intravenous, PRN **OR** Magnesium Sulfate 2 gram / 50mL Infusion (GIVE X 3 BAGS TO EQUAL 6GM TOTAL DOSE) - Mg 1.1 - 1.5 mg/dl, 2 g, Intravenous, PRN **OR** Magnesium Sulfate 4 gram infusion- Mg 1.6-1.9 mg/dL, 4 g, Intravenous, PRN, Rodrigo Palacios MD, Last Rate: 25 mL/hr at 05/02/19 0826, 4 g at 05/02/19 0826  •  melatonin tablet 5 mg, 5 mg, Oral, Nightly PRN, Rodrigo Palacios MD, 5 mg at 05/03/19 0007  •  methohexital (BREVITAL) 50 MG/5ML injection solution prefilled syringe  - ADS Override Pull, , , ,   •  midazolam (VERSED) 2 MG/2ML injection  - ADS Override Pull, , , ,   •  nicotine (NICODERM CQ) 21 MG/24HR patch 1 patch, 1 patch, Transdermal, Q24H, Justine Bueno APRN, 1 patch at 05/02/19 0823  •  pantoprazole (PROTONIX) EC tablet 40 mg, 40 mg, Oral, QAM, Justine Bueno, APRN, 40 mg at 05/02/19 0553  •  Pharmacy Consult - MTM, , Does not apply, Daily, Brittany Schmidt, [facility]  •  Pharmacy to Dose Heparin, , Does not apply, Continuous PRN, Nury Tariq, [facility]  •  potassium chloride (MICRO-K) CR capsule 40 mEq, 40 mEq, Oral, Daily, Piyush Avitia MD, 40 mEq at 05/02/19 0832  •  sodium  chloride 0.9 % flush 10 mL, 10 mL, Intravenous, PRN, Jamie Colon MD  •  sodium chloride 0.9 % flush 3 mL, 3 mL, Intravenous, Q12H, Justine Bueno APRN, 3 mL at 05/02/19 0823  •  sodium chloride 0.9 % flush 3-10 mL, 3-10 mL, Intravenous, PRN, Justine Bueno APRN    Physical Exam: General well-developed thin white male sitting on the side of the bed getting ready to get me his restroom.  Current heart rates in the 80s.        HEENT: No JVP is present, no icterus       Respiratory: Equal bilateral symmetrical expansion with reduced breath sounds he also has some wheezes and rhonchi       Cardiovascular: Regular rate and rhythm without murmur gallop or click and no peripheral edema to palpation       GI:        Lower Extremities: Mild bluish discoloration secondary to venous stasis disease but no lesions       Neuro: Cranial nerves II through XII appear grossly normal he moves all 4 extremities       Skin: Warm and dry with no edema to palpation       Psych: Pleasant affect    Results Review: Intake exceeds output.  The BNP is higher than a few days ago.  Vital signs are stable.  I personally reviewed the patient's cardiac cath films from yesterday and it showed very tight proximal LAD and mid LAD.  The right is a large vessel with minimal disease in the the main artery with distal vessel disease.    Radiology Results:  Imaging Results (last 72 hours)     Procedure Component Value Units Date/Time    CT Angiogram Chest With & Without Contrast [[id number]] Collected:  05/02/19 1757     Updated:  05/02/19 1757    Narrative:       EXAMINATION: CT ANGIOGRAM CHEST W/WO CONTRAST - 05/02/2019      INDICATION: Pericarditis.     TECHNIQUE: Spiral acquisition 3 mm post-IV contrast images through the  chest with coronal and sagittal 2D reconstructions.     The radiation dose reduction device was turned on for each scan per the  ALARA (As Low as Reasonably Achievable) protocol.     COMPARISON: 11/29/2012 CT scan.    "  FINDINGS: History indicates pericarditis, cardiomyopathy, pericardial  effusion.     Note is made of patient's gastric pull-up. There are shotty mediastinal  lymph nodes which are unchanged from 2012. There is a small to moderate  pericardial effusion. There is good contrast opacification of the  pulmonary arteries. No filling defects are seen to suggest pulmonary  embolic disease.     Lung window images show small symmetric pleural effusions and mild right  basilar bullous change. There is minimal associated atelectasis.     Included images of the upper abdomen show prominent left liver lobe and  slightly nodular liver capsule, equivocal for cirrhosis. The spleen is  not enlarged. Dense material in the gallbladder could represent  gallbladder sludge or vicarious excretion of contrast. Pancreas appears  atrophic and contains multiple calcifications consistent with chronic  pancreatitis. No acute pancreatitis is seen. Adrenal glands appear  unremarkable. Left kidney is not identified. Right kidney appears  hyperplastic.       Impression:       1. Small to moderate pericardial effusion.  2. Small bilateral pleural effusions.  3. Stable shotty mediastinal lymph nodes since 2012.  4. Changes of chronic pancreatitis. No evidence of acute pancreatitis.  5. Normal variant contrast excretion into the gallbladder versus  gallbladder \"sludge.\"  6. No evidence of pulmonary embolus or other acute disease elsewhere.     DICTATED:   05/02/2019  EDITED/ls :   05/02/2019        XR Chest 1 View [[id number]] Collected:  04/30/19 1635     Updated:  04/30/19 1859    Narrative:       EXAMINATION: XR CHEST 1 VW-04/30/2019:      INDICATION: SOA, triage protocol.      COMPARISON: NONE.     FINDINGS: Cardiac size borderline enlarged. Chronic lung changes without  focal opacification or consolidation. Probable trace bilateral pleural  effusions with mild interstitial prominence. No pneumothorax.           Impression:       Borderline " cardiomegaly with trace bilateral pleural  effusions and chronic lung changes.     D:  04/30/2019  E:  04/30/2019        This report was finalized on 4/30/2019 6:56 PM by Dr. Yared Lemus.             EKG: MAT    ECHO: EF 30%    Tele: Sinus rhythm    Assessment   Assessment/Plan: 1 this patient comes in with a one-month history of shortness of breath lower extremity edema and orthopnea.  Was found to have heart failure with reduced EF.  Initially it was thought that his symptoms were due to arrhythmia.  However we discern that his rhythm was MAT and not a flutter.  The patient's BNP is higher so it needs to be further diuresed.  Will order daily Lasix  2 severe CAD he needs CABG  3 pericardial effusion with possible constriction with evidence of right atrial and right ventricular diastolic indentation  4 sodium is down to 133  Still waiting for pulmonary to see the patient  Potassium is 3.5 so I will give a dose today specimens were continuing diuresis    Piyush Avitia MD  05/03/19  7:28 AM

## 2019-05-03 NOTE — PROGRESS NOTES
CTS Progress Note           LOS: 3 days     Chief Complaint:  Shortness of breath    Subjective  VSS. No acute events overnight. Cath yesterday revealed coronary artery disease.  Plans to tentatively have CABG on Monday.    Objective    Vital Signs  Temp:  [96.6 °F (35.9 °C)-98.6 °F (37 °C)] 96.6 °F (35.9 °C)  Heart Rate:  [] 87  Resp:  [16-20] 16  BP: (101-133)/() 124/76    Physical Exam:   General Appearance: alert, appears stated age and cooperative   Lungs: scattered wheeze throughout   Heart: regular rhythm & normal rate, normal S1, S2 and no murmur, no gallop, no rub   Abdomen:  Soft, nontender, normoactive bowel sounds   Extremities:  Warm, no edema     Results   Results from last 7 days   Lab Units 05/03/19  0501   WBC 10*3/mm3 8.60   HEMOGLOBIN g/dL 12.1*   HEMATOCRIT % 37.2*   PLATELETS 10*3/mm3 254     Results from last 7 days   Lab Units 05/03/19  0501   SODIUM mmol/L 133*   POTASSIUM mmol/L 3.5   CHLORIDE mmol/L 95*   CO2 mmol/L 26.0   BUN mg/dL 11   CREATININE mg/dL 0.76   GLUCOSE mg/dL 205*   CALCIUM mg/dL 8.8       Imaging Results (last 24 hours)     Procedure Component Value Units Date/Time    CT Angiogram Chest With & Without Contrast [[id number]] Collected:  05/02/19 1757     Updated:  05/02/19 1757    Narrative:       EXAMINATION: CT ANGIOGRAM CHEST W/WO CONTRAST - 05/02/2019      INDICATION: Pericarditis.     TECHNIQUE: Spiral acquisition 3 mm post-IV contrast images through the  chest with coronal and sagittal 2D reconstructions.     The radiation dose reduction device was turned on for each scan per the  ALARA (As Low as Reasonably Achievable) protocol.     COMPARISON: 11/29/2012 CT scan.     FINDINGS: History indicates pericarditis, cardiomyopathy, pericardial  effusion.     Note is made of patient's gastric pull-up. There are shotty mediastinal  lymph nodes which are unchanged from 2012. There is a small to moderate  pericardial effusion. There is good contrast opacification of  "the  pulmonary arteries. No filling defects are seen to suggest pulmonary  embolic disease.     Lung window images show small symmetric pleural effusions and mild right  basilar bullous change. There is minimal associated atelectasis.     Included images of the upper abdomen show prominent left liver lobe and  slightly nodular liver capsule, equivocal for cirrhosis. The spleen is  not enlarged. Dense material in the gallbladder could represent  gallbladder sludge or vicarious excretion of contrast. Pancreas appears  atrophic and contains multiple calcifications consistent with chronic  pancreatitis. No acute pancreatitis is seen. Adrenal glands appear  unremarkable. Left kidney is not identified. Right kidney appears  hyperplastic.       Impression:       1. Small to moderate pericardial effusion.  2. Small bilateral pleural effusions.  3. Stable shotty mediastinal lymph nodes since 2012.  4. Changes of chronic pancreatitis. No evidence of acute pancreatitis.  5. Normal variant contrast excretion into the gallbladder versus  gallbladder \"sludge.\"  6. No evidence of pulmonary embolus or other acute disease elsewhere.     DICTATED:   05/02/2019  EDITED/ls :   05/02/2019              Assessment    Atrial fibrillation with RVR (CMS/HCC)    History of esophageal cancer    Type 2 diabetes mellitus (CMS/HCC)    COPD (chronic obstructive pulmonary disease) (CMS/HCC)    GERD without esophagitis    Elevated lactic acid level    Acute CHF (CMS/HCC)    Hyponatremia    Bilateral pleural effusion    Pericardial effusion (noninflammatory)    Acute on chronic systolic CHF (congestive heart failure) (CMS/HCC)    Left ventricular dysfunction    Coronary artery disease involving native heart with angina pectoris (CMS/HCC)      Plan   Fluid restriction for hyponatremia  Consult Pulmonology  Continue preoperative preparation for tentative CABG on Monday with Dr. Lili Salinas PA-C  05/03/19  7:15 AM     As above.  Patient " is stable.  CABG scheduled. Risk of procedure is >. Discussed with patient.  I have reviewed, verified, and confirmed the above history and current status.  I have examined the patient and confirmed the above physical findings.Above plan and treatment regimen discussed in detail with patient.  Options of treatment, attendant risks vs benefits, and my recommendations were discussed and all questions answered.    Zaid Pearson MD  [facility]  05/03/19   12:26 PM

## 2019-05-04 LAB
ALBUMIN SERPL-MCNC: 3 G/DL (ref 3.5–5.2)
ALBUMIN/GLOB SERPL: 0.9 G/DL
ALP SERPL-CCNC: 78 U/L (ref 39–117)
ALT SERPL W P-5'-P-CCNC: 7 U/L (ref 1–41)
ANION GAP SERPL CALCULATED.3IONS-SCNC: 14 MMOL/L
ANION GAP SERPL CALCULATED.3IONS-SCNC: 15 MMOL/L
AST SERPL-CCNC: 9 U/L (ref 1–40)
BILIRUB SERPL-MCNC: 0.7 MG/DL (ref 0.2–1.2)
BUN BLD-MCNC: 14 MG/DL (ref 8–23)
BUN BLD-MCNC: 14 MG/DL (ref 8–23)
BUN/CREAT SERPL: 13.2 (ref 7–25)
BUN/CREAT SERPL: 15.4 (ref 7–25)
CALCIUM SPEC-SCNC: 8.7 MG/DL (ref 8.6–10.5)
CALCIUM SPEC-SCNC: 9.1 MG/DL (ref 8.6–10.5)
CHLORIDE SERPL-SCNC: 96 MMOL/L (ref 98–107)
CHLORIDE SERPL-SCNC: 97 MMOL/L (ref 98–107)
CHOLEST SERPL-MCNC: 100 MG/DL (ref 0–200)
CO2 SERPL-SCNC: 27 MMOL/L (ref 22–29)
CO2 SERPL-SCNC: 28 MMOL/L (ref 22–29)
CREAT BLD-MCNC: 0.91 MG/DL (ref 0.76–1.27)
CREAT BLD-MCNC: 1.06 MG/DL (ref 0.76–1.27)
DEPRECATED RDW RBC AUTO: 52.7 FL (ref 37–54)
ERYTHROCYTE [DISTWIDTH] IN BLOOD BY AUTOMATED COUNT: 17.2 % (ref 12.3–15.4)
GFR SERPL CREATININE-BSD FRML MDRD: 68 ML/MIN/1.73
GFR SERPL CREATININE-BSD FRML MDRD: 81 ML/MIN/1.73
GLOBULIN UR ELPH-MCNC: 3.5 GM/DL
GLUCOSE BLD-MCNC: 145 MG/DL (ref 65–99)
GLUCOSE BLD-MCNC: 151 MG/DL (ref 65–99)
GLUCOSE BLDC GLUCOMTR-MCNC: 195 MG/DL (ref 70–130)
GLUCOSE BLDC GLUCOMTR-MCNC: 207 MG/DL (ref 70–130)
GLUCOSE BLDC GLUCOMTR-MCNC: 222 MG/DL (ref 70–130)
GLUCOSE BLDC GLUCOMTR-MCNC: 323 MG/DL (ref 70–130)
HCT VFR BLD AUTO: 37.1 % (ref 37.5–51)
HDLC SERPL-MCNC: 40 MG/DL (ref 40–60)
HGB BLD-MCNC: 11.7 G/DL (ref 13–17.7)
LDLC SERPL CALC-MCNC: 44 MG/DL (ref 0–100)
LDLC/HDLC SERPL: 1.1 {RATIO}
MAGNESIUM SERPL-MCNC: 1.8 MG/DL (ref 1.6–2.4)
MCH RBC QN AUTO: 26.7 PG (ref 26.6–33)
MCHC RBC AUTO-ENTMCNC: 31.5 G/DL (ref 31.5–35.7)
MCV RBC AUTO: 84.5 FL (ref 79–97)
PA ADP PRP-ACNC: 270 PRU
PLATELET # BLD AUTO: 258 10*3/MM3 (ref 140–450)
PMV BLD AUTO: 9.6 FL (ref 6–12)
POTASSIUM BLD-SCNC: 3.7 MMOL/L (ref 3.5–5.2)
POTASSIUM BLD-SCNC: 3.9 MMOL/L (ref 3.5–5.2)
PROT SERPL-MCNC: 6.5 G/DL (ref 6–8.5)
RBC # BLD AUTO: 4.39 10*6/MM3 (ref 4.14–5.8)
SODIUM BLD-SCNC: 138 MMOL/L (ref 136–145)
SODIUM BLD-SCNC: 139 MMOL/L (ref 136–145)
TRIGL SERPL-MCNC: 81 MG/DL (ref 0–150)
VLDLC SERPL-MCNC: 16.2 MG/DL
WBC NRBC COR # BLD: 14.84 10*3/MM3 (ref 3.4–10.8)

## 2019-05-04 PROCEDURE — 25010000002 FUROSEMIDE PER 20 MG: Performed by: INTERNAL MEDICINE

## 2019-05-04 PROCEDURE — 99231 SBSQ HOSP IP/OBS SF/LOW 25: CPT | Performed by: THORACIC SURGERY (CARDIOTHORACIC VASCULAR SURGERY)

## 2019-05-04 PROCEDURE — 99232 SBSQ HOSP IP/OBS MODERATE 35: CPT | Performed by: INTERNAL MEDICINE

## 2019-05-04 PROCEDURE — 94799 UNLISTED PULMONARY SVC/PX: CPT

## 2019-05-04 PROCEDURE — 85576 BLOOD PLATELET AGGREGATION: CPT | Performed by: PHYSICIAN ASSISTANT

## 2019-05-04 PROCEDURE — 83735 ASSAY OF MAGNESIUM: CPT | Performed by: INTERNAL MEDICINE

## 2019-05-04 PROCEDURE — 80061 LIPID PANEL: CPT | Performed by: PHYSICIAN ASSISTANT

## 2019-05-04 PROCEDURE — 63710000001 INSULIN LISPRO (HUMAN) PER 5 UNITS: Performed by: NURSE PRACTITIONER

## 2019-05-04 PROCEDURE — 25010000002 ENOXAPARIN PER 10 MG: Performed by: PHYSICIAN ASSISTANT

## 2019-05-04 PROCEDURE — 82962 GLUCOSE BLOOD TEST: CPT

## 2019-05-04 PROCEDURE — 85027 COMPLETE CBC AUTOMATED: CPT | Performed by: PHYSICIAN ASSISTANT

## 2019-05-04 PROCEDURE — 80053 COMPREHEN METABOLIC PANEL: CPT | Performed by: INTERNAL MEDICINE

## 2019-05-04 RX ORDER — ATORVASTATIN CALCIUM 10 MG/1
10 TABLET, FILM COATED ORAL NIGHTLY
Status: DISCONTINUED | OUTPATIENT
Start: 2019-05-04 | End: 2019-05-09 | Stop reason: HOSPADM

## 2019-05-04 RX ORDER — CHLORHEXIDINE GLUCONATE 0.12 MG/ML
15 RINSE ORAL EVERY 12 HOURS
Status: DISCONTINUED | OUTPATIENT
Start: 2019-05-05 | End: 2019-05-04

## 2019-05-04 RX ORDER — CHLORHEXIDINE GLUCONATE 500 MG/1
1 CLOTH TOPICAL EVERY 12 HOURS
Status: DISCONTINUED | OUTPATIENT
Start: 2019-05-05 | End: 2019-05-06

## 2019-05-04 RX ORDER — CHLORHEXIDINE GLUCONATE 500 MG/1
1 CLOTH TOPICAL EVERY 12 HOURS PRN
Status: DISCONTINUED | OUTPATIENT
Start: 2019-05-05 | End: 2019-05-04

## 2019-05-04 RX ORDER — CHLORHEXIDINE GLUCONATE 0.12 MG/ML
15 RINSE ORAL EVERY 12 HOURS
Status: COMPLETED | OUTPATIENT
Start: 2019-05-05 | End: 2019-05-06

## 2019-05-04 RX ADMIN — AMIODARONE HYDROCHLORIDE 400 MG: 200 TABLET ORAL at 08:17

## 2019-05-04 RX ADMIN — ATORVASTATIN CALCIUM 10 MG: 10 TABLET, FILM COATED ORAL at 20:48

## 2019-05-04 RX ADMIN — SODIUM CHLORIDE, PRESERVATIVE FREE 3 ML: 5 INJECTION INTRAVENOUS at 20:52

## 2019-05-04 RX ADMIN — ENOXAPARIN SODIUM 40 MG: 40 INJECTION SUBCUTANEOUS at 08:16

## 2019-05-04 RX ADMIN — MELATONIN TAB 5 MG 5 MG: 5 TAB at 20:49

## 2019-05-04 RX ADMIN — INSULIN LISPRO 2 UNITS: 100 INJECTION, SOLUTION INTRAVENOUS; SUBCUTANEOUS at 08:17

## 2019-05-04 RX ADMIN — NICOTINE 1 PATCH: 21 PATCH, EXTENDED RELEASE TRANSDERMAL at 08:17

## 2019-05-04 RX ADMIN — METOPROLOL TARTRATE 12.5 MG: 25 TABLET, FILM COATED ORAL at 20:48

## 2019-05-04 RX ADMIN — IPRATROPIUM BROMIDE AND ALBUTEROL SULFATE 3 ML: 2.5; .5 SOLUTION RESPIRATORY (INHALATION) at 08:33

## 2019-05-04 RX ADMIN — IPRATROPIUM BROMIDE AND ALBUTEROL SULFATE 3 ML: 2.5; .5 SOLUTION RESPIRATORY (INHALATION) at 15:44

## 2019-05-04 RX ADMIN — PANTOPRAZOLE SODIUM 40 MG: 40 TABLET, DELAYED RELEASE ORAL at 05:18

## 2019-05-04 RX ADMIN — INSULIN LISPRO 5 UNITS: 100 INJECTION, SOLUTION INTRAVENOUS; SUBCUTANEOUS at 12:50

## 2019-05-04 RX ADMIN — POTASSIUM CHLORIDE 40 MEQ: 750 CAPSULE, EXTENDED RELEASE ORAL at 08:17

## 2019-05-04 RX ADMIN — INSULIN LISPRO 3 UNITS: 100 INJECTION, SOLUTION INTRAVENOUS; SUBCUTANEOUS at 17:49

## 2019-05-04 RX ADMIN — METOPROLOL TARTRATE 12.5 MG: 25 TABLET, FILM COATED ORAL at 08:17

## 2019-05-04 RX ADMIN — IPRATROPIUM BROMIDE AND ALBUTEROL SULFATE 3 ML: 2.5; .5 SOLUTION RESPIRATORY (INHALATION) at 12:14

## 2019-05-04 RX ADMIN — ASPIRIN 81 MG: 81 TABLET, COATED ORAL at 08:17

## 2019-05-04 RX ADMIN — IPRATROPIUM BROMIDE AND ALBUTEROL SULFATE 3 ML: 2.5; .5 SOLUTION RESPIRATORY (INHALATION) at 21:25

## 2019-05-04 RX ADMIN — INSULIN LISPRO 3 UNITS: 100 INJECTION, SOLUTION INTRAVENOUS; SUBCUTANEOUS at 20:49

## 2019-05-04 RX ADMIN — FUROSEMIDE 40 MG: 10 INJECTION, SOLUTION INTRAMUSCULAR; INTRAVENOUS at 08:17

## 2019-05-04 RX ADMIN — AMIODARONE HYDROCHLORIDE 400 MG: 200 TABLET ORAL at 20:48

## 2019-05-04 RX ADMIN — SODIUM CHLORIDE, PRESERVATIVE FREE 3 ML: 5 INJECTION INTRAVENOUS at 08:18

## 2019-05-04 NOTE — PLAN OF CARE
Problem: Patient Care Overview  Goal: Plan of Care Review  Outcome: Ongoing (interventions implemented as appropriate)   05/04/19 0351   Coping/Psychosocial   Plan of Care Reviewed With patient   Plan of Care Review   Progress no change   OTHER   Outcome Summary Pt with no acute distress overnight. Plan for CABG and window on Monday.

## 2019-05-04 NOTE — PROGRESS NOTES
[facility] Medicine Services  PROGRESS NOTE    Patient Name: Francesco Arroyo  : 1945  MRN: [id number]    Date of Admission: 2019  Length of Stay: 4  Primary Care Physician: Alex Velez DO    Subjective   Subjective     CC:  Atrial fibrillation    HPI:  Feels much stronger overall, denies chest pain, has been up walking in the hallway    Review of Systems  Gen- No fevers, chills  CV- No chest pain, palpitations  Resp- No cough, dyspnea  GI- No N/V/D, abd pain      Otherwise ROS is negative except as mentioned in the HPI.    Objective   Objective     Vital Signs:   Temp:  [98.5 °F (36.9 °C)-99.5 °F (37.5 °C)] 98.5 °F (36.9 °C)  Heart Rate:  [] 81  Resp:  [16-18] 18  BP: (108-130)/(70-81) 119/81        Physical Exam:  Constitutional -no acute distress, non toxic, standing up at sink, sits on edge of bed, thin male  HEENT-NCAT, mucous membranes moist  CV-RRR, S1 S2 normal, no m/r/g  Resp-diminished bilaterally, faint rales at bases  Abd-soft, non-tender, non-distended, normo active bowel sounds  Ext-No lower extremity cyanosis, clubbing or edema bilaterally  Neuro-alert and oriented, speech clear, moves all extremities   Psych-normal affect   Skin- No rash on exposed UE or LE bilaterally      Results Reviewed:  I have personally reviewed current lab, radiology, and data and agree.    Results from last 7 days   Lab Units 19  04019  05019  0608  19  1528   WBC 10*3/mm3 14.84* 8.60 8.22   < > 10.71   HEMOGLOBIN g/dL 11.7* 12.1* 12.9*   < > 13.3   HEMATOCRIT % 37.1* 37.2* 40.2   < > 39.8   PLATELETS 10*3/mm3 258 254 291   < > 312   INR   --   --   --   --  1.17*    < > = values in this interval not displayed.     Results from last 7 days   Lab Units 19  04019  05019  0633  19  1528   SODIUM mmol/L 138  139 133* 132*   < > 133*   POTASSIUM mmol/L 3.9  3.7 3.5 3.2*   < > 3.9   CHLORIDE mmol/L 96*  97* 95* 93*    < > 89*   CO2 mmol/L 27.0  28.0 26.0 25.0   < > 27.0   BUN mg/dL 14  14 11 12   < > 13   CREATININE mg/dL 0.91  1.06 0.76 0.85   < > 0.85   GLUCOSE mg/dL 145*  151* 205* 217*   < > 137*   CALCIUM mg/dL 9.1  8.7 8.8 8.7   < > 9.3   ALT (SGPT) U/L 7  --   --   --  12   AST (SGOT) U/L 9  --   --   --  16    < > = values in this interval not displayed.     Estimated Creatinine Clearance: 59.1 mL/min (by C-G formula based on SCr of 1.06 mg/dL).    No results found for: BNP    Microbiology Results Abnormal     None          Imaging Results (last 24 hours)     Procedure Component Value Units Date/Time    CT Angiogram Chest With & Without Contrast [[id number]] Collected:  05/02/19 1757     Updated:  05/03/19 1757    Narrative:       EXAMINATION: CT ANGIOGRAM CHEST W/WO CONTRAST - 05/02/2019      INDICATION: Pericarditis.     TECHNIQUE: Spiral acquisition 3 mm post-IV contrast images through the  chest with coronal and sagittal 2D reconstructions.     The radiation dose reduction device was turned on for each scan per the  ALARA (As Low as Reasonably Achievable) protocol.     COMPARISON: 11/29/2012 CT scan.     FINDINGS: History indicates pericarditis, cardiomyopathy, pericardial  effusion.     Note is made of patient's gastric pull-up. There are shotty mediastinal  lymph nodes which are unchanged from 2012. There is a small to moderate  pericardial effusion. There is good contrast opacification of the  pulmonary arteries. No filling defects are seen to suggest pulmonary  embolic disease.     Lung window images show small symmetric pleural effusions and mild right  basilar bullous change. There is minimal associated atelectasis.     Included images of the upper abdomen show prominent left liver lobe and  slightly nodular liver capsule, equivocal for cirrhosis. The spleen is  not enlarged. Dense material in the gallbladder could represent  gallbladder sludge or vicarious excretion of contrast. Pancreas appears  atrophic  "and contains multiple calcifications consistent with chronic  pancreatitis. No acute pancreatitis is seen. Adrenal glands appear  unremarkable. Left kidney is not identified. Right kidney appears  hyperplastic.       Impression:       1. Small to moderate pericardial effusion.  2. Small bilateral pleural effusions.  3. Stable shotty mediastinal lymph nodes since 2012.  4. Changes of chronic pancreatitis. No evidence of acute pancreatitis.  5. Normal variant contrast excretion into the gallbladder versus  gallbladder \"sludge.\"  6. No evidence of pulmonary embolus or other acute disease elsewhere.     DICTATED:   05/02/2019  EDITED/ls :   05/02/2019      This report was finalized on 5/3/2019 5:53 PM by DR. James Gaspar MD.             Results for orders placed during the hospital encounter of 04/30/19   Adult Transesophageal Echo (LENKA) W/ Cont if Necessary Per Protocol    Narrative · Left ventricular systolic function is severely decreased. Estimated EF   appears to be in the range of 21 - 25%.  · There is left ventricular global hypokinesis noted.  · Left atrial cavity size is mildly dilated. There is no spontaneous echo   contrast present. The left atrial appendage was visualized through   multiple planes. Left atrial appendage was found to be singularly lobar in   nature. Doppler interrogation shows normal flow within the left atrial   appendage. No evidence of a left atrial appendage thrombus was present  · There is moderate (grade 4) plaque in the aortic arch present.  · There is a moderate (1-2cm) pericardial effusion adjacent to the right   ventricle adjacent to the right atrium. There is right atrial free wall   collapse present intermittently. No right ventricular collapse.          I have reviewed the medications:    Current Facility-Administered Medications:   •  acetaminophen (TYLENOL) tablet 650 mg, 650 mg, Oral, Q6H PRN, Justine Bueno R, APRN  •  acetaminophen (TYLENOL) tablet 650 mg, 650 mg, Oral, Q4H " PRN, Jayme Castano MD, 650 mg at 05/03/19 2105  •  amiodarone (PACERONE) tablet 400 mg, 400 mg, Oral, Q12H, Piyush Avitia MD, 400 mg at 05/04/19 0817  •  aspirin EC tablet 81 mg, 81 mg, Oral, Daily, Justine Bueno APRN, 81 mg at 05/04/19 0817  •  atorvastatin (LIPITOR) tablet 10 mg, 10 mg, Oral, Nightly, Valentina Elam APRN  •  [START ON 5/5/2019] chlorhexidine (PERIDEX) 0.12 % solution 15 mL, 15 mL, Mouth/Throat, Q12H, Kisha Salinas PA-C  •  [START ON 5/5/2019] Chlorhexidine Gluconate Cloth 2 % pads 1 application, 1 application, Topical, Q12H PRN, Kisha Salinas PA-C  •  dextrose (D50W) 25 g/ 50mL Intravenous Solution 25 g, 25 g, Intravenous, Q15 Min PRN, Justine Bueno APRN  •  dextrose (GLUTOSE) oral gel 15 g, 15 g, Oral, Q15 Min PRN, Justine Bueno APRN  •  fentaNYL citrate (PF) (SUBLIMAZE) 100 MCG/2ML injection  - ADS Override Pull, , , ,   •  furosemide (LASIX) injection 40 mg, 40 mg, Intravenous, Daily, Piyush Avitia MD, 40 mg at 05/04/19 0817  •  glucagon (human recombinant) (GLUCAGEN DIAGNOSTIC) injection 1 mg, 1 mg, Subcutaneous, PRN, Justine Bueno APRN  •  insulin lispro (humaLOG) injection 0-7 Units, 0-7 Units, Subcutaneous, 4x Daily With Meals & Nightly, Justine Bueno APRN, 5 Units at 05/04/19 1250  •  ipratropium-albuterol (DUO-NEB) nebulizer solution 3 mL, 3 mL, Nebulization, Q4H PRN, Justine Bueno APRN  •  ipratropium-albuterol (DUO-NEB) nebulizer solution 3 mL, 3 mL, Nebulization, 4x Daily - RT, Kisha Salinas PA-C, 3 mL at 05/04/19 1544  •  Magnesium Sulfate 2 gram Bolus, followed by 8 gram infusion (total Mg dose 10 grams)- Mg less than or equal to 1mg/dL, 2 g, Intravenous, PRN **OR** Magnesium Sulfate 2 gram / 50mL Infusion (GIVE X 3 BAGS TO EQUAL 6GM TOTAL DOSE) - Mg 1.1 - 1.5 mg/dl, 2 g, Intravenous, PRN **OR** Magnesium Sulfate 4 gram infusion- Mg 1.6-1.9 mg/dL, 4 g, Intravenous, PRN, Rodrigo Palacios MD, Last Rate: 25 mL/hr at 05/02/19 0826,  4 g at 05/02/19 0826  •  melatonin tablet 5 mg, 5 mg, Oral, Nightly PRN, Rodrigo Palacios MD, 5 mg at 05/03/19 2105  •  methohexital (BREVITAL) 50 MG/5ML injection solution prefilled syringe  - ADS Override Pull, , , ,   •  metoprolol tartrate (LOPRESSOR) half tablet 12.5 mg, 12.5 mg, Oral, Q12H, Kisha Salinas PA-C, 12.5 mg at 05/04/19 0817  •  midazolam (VERSED) 2 MG/2ML injection  - ADS Override Pull, , , ,   •  [START ON 5/5/2019] mupirocin (BACTROBAN) 2 % nasal ointment 1 application, 1 application, Each Nare, Q12H PRN, Kisha Salinas PA-C  •  nicotine (NICODERM CQ) 21 MG/24HR patch 1 patch, 1 patch, Transdermal, Q24H, Justine Bueno APRN, 1 patch at 05/04/19 0817  •  pantoprazole (PROTONIX) EC tablet 40 mg, 40 mg, Oral, QAM, Justine Bueno APRN, 40 mg at 05/04/19 0518  •  Pharmacy Consult - MTM, , Does not apply, Daily, Brittany Schmidt, [facility]  •  sodium chloride 0.9 % flush 10 mL, 10 mL, Intravenous, PRN, Jamie Colon MD  •  sodium chloride 0.9 % flush 3 mL, 3 mL, Intravenous, Q12H, Justine Bueno APRN, 3 mL at 05/04/19 0818  •  sodium chloride 0.9 % flush 3-10 mL, 3-10 mL, Intravenous, PRN, Justine Bueno APRN    amiodarone 400 mg Oral Q12H   aspirin 81 mg Oral Daily   atorvastatin 10 mg Oral Nightly   [START ON 5/5/2019] chlorhexidine 15 mL Mouth/Throat Q12H   fentaNYL citrate (PF)      furosemide 40 mg Intravenous Daily   insulin lispro 0-7 Units Subcutaneous 4x Daily With Meals & Nightly   ipratropium-albuterol 3 mL Nebulization 4x Daily - RT   methohexital      metoprolol tartrate 12.5 mg Oral Q12H   midazolam      nicotine 1 patch Transdermal Q24H   pantoprazole 40 mg Oral QAM   pharmacy consult - MTM  Does not apply Daily   sodium chloride 3 mL Intravenous Q12H         Assessment/Plan   Assessment / Plan     Active Hospital Problems    Diagnosis POA   • **Atrial fibrillation with RVR (CMS/HCC) [I48.91] Yes   • History of esophageal cancer [Z85.01] Yes   • Type 2 diabetes mellitus  (CMS/[facility]) [E11.9] Yes   • COPD (chronic obstructive pulmonary disease), Severe physiology, FEV1 0.9L (34%) [J44.9] Yes   • GERD without esophagitis [K21.9] Yes   • Elevated lactic acid level [R79.89] Yes   • Acute CHF (CMS/[facility]) [I50.9] Yes   • Hyponatremia [E87.1] Yes   • Bilateral pleural effusion [J90] Yes   • Pericardial effusion (noninflammatory) [I31.3] Unknown     Added automatically from request for surgery [id number]     • Acute on chronic systolic CHF (congestive heart failure) (CMS/[facility]) [I50.23] Unknown     Added automatically from request for surgery [id number]     • Left ventricular dysfunction [I51.9] Unknown     Added automatically from request for surgery [id number]     • Coronary artery disease involving native heart with angina pectoris (CMS/[facility]) [I25.119] Unknown     Added automatically from request for surgery [id number]            Brief Hospital Course to date:  Francesco Arroyo is a 73 y.o. male presents with LE edema, hyponatremia, and afib RVR    Pericardial effusion  - possible pericariectomy/window planned    Multivessel CAD  - CABG evaluation underway - possible surgery Monday vs [facility] with PCI    Atrial fibrillation with RVR  - amiodarone and cardizem  - replace electrolytes, check K and mag in am    Acute CHF, systolic  - EF 25%  - lasix scheduled    Leukocytosis  - unclear etiology, patient afebrile, no cough, clinically appears non toxic (up walking in the hallway)  - repeat cbc am, broaden workup as needed    Hyponatremia  - improved, fluid restrict in anticipation of surgery     DMII  - a1c 7.7    COPD    Bronchitis  - complete course of doxycycline     Tobacco abuse  - counseled cessation      DVT Prophylaxis: lovenox    Disposition: I expect the patient to be discharged TBD    CODE STATUS:   Code Status and Medical Interventions:   Ordered at: 04/30/19 2041     Code Status:    CPR     Medical Interventions (Level of Support Prior to Arrest):    Full         Electronically signed by Rodrigo Palacios  MD, 05/04/19, 4:13 PM.

## 2019-05-04 NOTE — PROGRESS NOTES
CTS Progress Note           LOS: 4 days     Chief Complaint:  Coronary artery disease    Subjective  VSS. No acute events overnight. Denies chest pain or shortness of breath. PFT's revealed severe COPD with an FEV1 of 0.9 L.   Plans to tentatively have CABG on Monday.    Objective    Vital Signs  Temp:  [98.3 °F (36.8 °C)-99.5 °F (37.5 °C)] 98.5 °F (36.9 °C)  Heart Rate:  [] 91  Resp:  [16-18] 16  BP: (108-130)/(70-79) 120/79    Physical Exam:   General Appearance: alert, appears stated age and cooperative   Lungs: scattered wheeze throughout   Heart: regular rhythm & normal rate, normal S1, S2 and no murmur, no gallop, no rub   Abdomen:  Soft, nontender, normoactive bowel sounds   Extremities:  Warm, no edema     Results     Results from last 7 days   Lab Units 05/03/19  0501   WBC 10*3/mm3 8.60   HEMOGLOBIN g/dL 12.1*   HEMATOCRIT % 37.2*   PLATELETS 10*3/mm3 254     Results from last 7 days   Lab Units 05/03/19  0501   SODIUM mmol/L 133*   POTASSIUM mmol/L 3.5   CHLORIDE mmol/L 95*   CO2 mmol/L 26.0   BUN mg/dL 11   CREATININE mg/dL 0.76   GLUCOSE mg/dL 205*   CALCIUM mg/dL 8.8       Imaging Results (last 24 hours)     Procedure Component Value Units Date/Time    CT Angiogram Chest With & Without Contrast [[id number]] Collected:  05/02/19 1757     Updated:  05/03/19 1757    Narrative:       EXAMINATION: CT ANGIOGRAM CHEST W/WO CONTRAST - 05/02/2019      INDICATION: Pericarditis.     TECHNIQUE: Spiral acquisition 3 mm post-IV contrast images through the  chest with coronal and sagittal 2D reconstructions.     The radiation dose reduction device was turned on for each scan per the  ALARA (As Low as Reasonably Achievable) protocol.     COMPARISON: 11/29/2012 CT scan.     FINDINGS: History indicates pericarditis, cardiomyopathy, pericardial  effusion.     Note is made of patient's gastric pull-up. There are shotty mediastinal  lymph nodes which are unchanged from 2012. There is a small to moderate  pericardial  "effusion. There is good contrast opacification of the  pulmonary arteries. No filling defects are seen to suggest pulmonary  embolic disease.     Lung window images show small symmetric pleural effusions and mild right  basilar bullous change. There is minimal associated atelectasis.     Included images of the upper abdomen show prominent left liver lobe and  slightly nodular liver capsule, equivocal for cirrhosis. The spleen is  not enlarged. Dense material in the gallbladder could represent  gallbladder sludge or vicarious excretion of contrast. Pancreas appears  atrophic and contains multiple calcifications consistent with chronic  pancreatitis. No acute pancreatitis is seen. Adrenal glands appear  unremarkable. Left kidney is not identified. Right kidney appears  hyperplastic.       Impression:       1. Small to moderate pericardial effusion.  2. Small bilateral pleural effusions.  3. Stable shotty mediastinal lymph nodes since 2012.  4. Changes of chronic pancreatitis. No evidence of acute pancreatitis.  5. Normal variant contrast excretion into the gallbladder versus  gallbladder \"sludge.\"  6. No evidence of pulmonary embolus or other acute disease elsewhere.     DICTATED:   05/02/2019  EDITED/ls :   05/02/2019      This report was finalized on 5/3/2019 5:53 PM by DR. James Gaspar MD.             Assessment    Atrial fibrillation with RVR (CMS/HCC)    History of esophageal cancer    Type 2 diabetes mellitus (CMS/HCC)    COPD (chronic obstructive pulmonary disease), Severe physiology, FEV1 0.9L (34%)    GERD without esophagitis    Elevated lactic acid level    Acute CHF (CMS/HCC)    Hyponatremia    Bilateral pleural effusion    Pericardial effusion (noninflammatory)    Acute on chronic systolic CHF (congestive heart failure) (CMS/HCC)    Left ventricular dysfunction    Coronary artery disease involving native heart with angina pectoris (CMS/HCC)      Plan   Bronchodilator therapy prn per Pulmonary  Continue " preoperative preparation for tentative CABG on Monday with Dr. Lili Salinas PA-C  05/04/19  6:25 AM     CABG again discussed with the patient in detail.  Increased risk of the procedure secondary to previous mediastinal radiation and severe COPD discussed in detail.  Patient understands that he is at significant increased risk with the procedure but is at risk without a revascularization procedure.  He will discuss it further with his family before making a final decision.  Patient would be percutaneous intervention of the LAD with subsequent pericardial window.      Zaid Pearson MD  CTSurgery  05/04/19   11:46 AM

## 2019-05-04 NOTE — RESEARCH
STS Risk Assessment  Patient Name: Francesco Arroyo  Age:  73 y.o.    Risk of Mortality:  7.452%  Renal Failure:  3.911%  Permanent Stroke:  2.209%  Prolonged Ventilation:  21.974%  DSW Infection:  0.369%  Reoperation:  2.324%  Morbidity or Mortality:  26.715%  Short Length of Stay:  17.119%  Long Length of Stay:  17.438%    Discussed in detail with patient and family.      Zaid Pearson MD  5/4/2019. 11:44 AM

## 2019-05-04 NOTE — PROGRESS NOTES
"  [facility]   Inpatient Progress Note       LOS: 4 days   Patient Care Team:  Alex Velez DO as PCP - General (Family Medicine)    Chief Complaint:  Follow-up for CAD and pericardial effusion    Subjective     Interval History:   No chest pain. No dyspnea      Review of Systems:   Pertinent positives noted in history, exam, and assessment. Otherwise reviewed and negative.      Objective     Vitals:  Blood pressure 119/81, pulse 80, temperature 98.5 °F (36.9 °C), temperature source Oral, resp. rate 18, height 167.6 cm (66\"), weight 68.3 kg (150 lb 9.6 oz), SpO2 93 %.     Intake/Output Summary (Last 24 hours) at 5/4/2019 1503  Last data filed at 5/4/2019 0900  Gross per 24 hour   Intake 480 ml   Output 1200 ml   Net -720 ml     Physical Exam   Constitutional: He is oriented to person, place, and time. He appears well-developed and well-nourished.   Neck: No JVD present. No tracheal deviation present.   Cardiovascular: Normal rate, regular rhythm, normal heart sounds and intact distal pulses. Exam reveals no friction rub.   No murmur heard.  Pulmonary/Chest: Effort normal. No respiratory distress.   Basilar crackles   Abdominal: Soft. Bowel sounds are normal. There is no tenderness.   Musculoskeletal: He exhibits edema (1+ edema ble). He exhibits no deformity.   Neurological: He is alert and oriented to person, place, and time.   Skin: Skin is warm and dry.          Results Review:     I reviewed the patient's new clinical results.    Results from last 7 days   Lab Units 05/04/19  0401   WBC 10*3/mm3 14.84*   HEMOGLOBIN g/dL 11.7*   HEMATOCRIT % 37.1*   PLATELETS 10*3/mm3 258     Results from last 7 days   Lab Units 05/04/19  0401   SODIUM mmol/L 138  139   POTASSIUM mmol/L 3.9  3.7   CHLORIDE mmol/L 96*  97*   CO2 mmol/L 27.0  28.0   BUN mg/dL 14  14   CREATININE mg/dL 0.91  1.06   CALCIUM mg/dL 9.1  8.7   BILIRUBIN mg/dL 0.7   ALK PHOS U/L 78   ALT (SGPT) U/L 7   AST " (SGOT) U/L 9   GLUCOSE mg/dL 145*  151*     Results from last 7 days   Lab Units 05/04/19  0401   SODIUM mmol/L 138  139   POTASSIUM mmol/L 3.9  3.7   CHLORIDE mmol/L 96*  97*   CO2 mmol/L 27.0  28.0   BUN mg/dL 14  14   CREATININE mg/dL 0.91  1.06   GLUCOSE mg/dL 145*  151*   CALCIUM mg/dL 9.1  8.7     Results from last 7 days   Lab Units 04/30/19  1528   INR  1.17*     No results found for: TROPONINI  Results from last 7 days   Lab Units 04/30/19  1528   TSH mIU/mL 1.380     Results from last 7 days   Lab Units 05/04/19  0401   CHOLESTEROL mg/dL 100   TRIGLYCERIDES mg/dL 81   HDL CHOL mg/dL 40   LDL CHOL mg/dL 44     Results from last 7 days   Lab Units 05/02/19  0633   PROBNP pg/mL 1,810.0*     [facility]:  IMPRESSION:  1.  Severe proximal LAD and distal LAD stenosis  2.  Moderate severe mid left circumflex stenosis  Severe distal diffuse right PDA and posterior lateral disease  4.  LVEF 25% global    Tele:  SR    Assessment/Plan       Atrial fibrillation with RVR (CMS/[facility])    History of esophageal cancer    Type 2 diabetes mellitus (CMS/[facility])    COPD (chronic obstructive pulmonary disease), Severe physiology, FEV1 0.9L (34%)    GERD without esophagitis    Elevated lactic acid level    Acute CHF (CMS/[facility])    Hyponatremia    Bilateral pleural effusion    Pericardial effusion (noninflammatory)    Acute on chronic systolic CHF (congestive heart failure) (CMS/[facility])    Left ventricular dysfunction    Coronary artery disease involving native heart with angina pectoris (CMS/[facility])      1. Coronary artery disease  1. MVCAD by [facility]  2. Pericardial effusion  3. Cardiomyopathy, ischemic  1. EF 25%  4. MAT  5. Hyponatremia, improved    Plan:  · Start low dose statin due to CAD.  · Patient to have pericardial window Monday with Dr. Pearson.  · CAD medically for now.  If still symptomatic after pericardial window we can always consider PCI of his LAD, plus minus left circumflex, for symptom relief.  His distal RCA and distal LAD  are not candidate for revascularization.  · Continue IV diuretics as patient is still volume overloaded.  · Consider possible PCI to LAD    Electronically signed by NIMCO Salomon, 05/04/19, 3:03 PM.    Dictated utilizing Dragon dictation

## 2019-05-05 LAB
ABO GROUP BLD: NORMAL
ANION GAP SERPL CALCULATED.3IONS-SCNC: 13 MMOL/L
BASOPHILS # BLD AUTO: 0.02 10*3/MM3 (ref 0–0.2)
BASOPHILS NFR BLD AUTO: 0.2 % (ref 0–1.5)
BLD GP AB SCN SERPL QL: NEGATIVE
BUN BLD-MCNC: 15 MG/DL (ref 8–23)
BUN/CREAT SERPL: 13.9 (ref 7–25)
CALCIUM SPEC-SCNC: 9.2 MG/DL (ref 8.6–10.5)
CHLORIDE SERPL-SCNC: 94 MMOL/L (ref 98–107)
CO2 SERPL-SCNC: 27 MMOL/L (ref 22–29)
CREAT BLD-MCNC: 1.08 MG/DL (ref 0.76–1.27)
DEPRECATED RDW RBC AUTO: 52.4 FL (ref 37–54)
EOSINOPHIL # BLD AUTO: 0.07 10*3/MM3 (ref 0–0.4)
EOSINOPHIL NFR BLD AUTO: 0.7 % (ref 0.3–6.2)
ERYTHROCYTE [DISTWIDTH] IN BLOOD BY AUTOMATED COUNT: 17.1 % (ref 12.3–15.4)
GFR SERPL CREATININE-BSD FRML MDRD: 67 ML/MIN/1.73
GLUCOSE BLD-MCNC: 184 MG/DL (ref 65–99)
GLUCOSE BLDC GLUCOMTR-MCNC: 122 MG/DL (ref 70–130)
GLUCOSE BLDC GLUCOMTR-MCNC: 182 MG/DL (ref 70–130)
GLUCOSE BLDC GLUCOMTR-MCNC: 213 MG/DL (ref 70–130)
GLUCOSE BLDC GLUCOMTR-MCNC: 321 MG/DL (ref 70–130)
HCT VFR BLD AUTO: 36.8 % (ref 37.5–51)
HGB BLD-MCNC: 11.8 G/DL (ref 13–17.7)
IMM GRANULOCYTES # BLD AUTO: 0.04 10*3/MM3 (ref 0–0.05)
IMM GRANULOCYTES NFR BLD AUTO: 0.4 % (ref 0–0.5)
LYMPHOCYTES # BLD AUTO: 0.92 10*3/MM3 (ref 0.7–3.1)
LYMPHOCYTES NFR BLD AUTO: 9.1 % (ref 19.6–45.3)
MAGNESIUM SERPL-MCNC: 1.8 MG/DL (ref 1.6–2.4)
MCH RBC QN AUTO: 26.6 PG (ref 26.6–33)
MCHC RBC AUTO-ENTMCNC: 32.1 G/DL (ref 31.5–35.7)
MCV RBC AUTO: 83.1 FL (ref 79–97)
MONOCYTES # BLD AUTO: 0.87 10*3/MM3 (ref 0.1–0.9)
MONOCYTES NFR BLD AUTO: 8.6 % (ref 5–12)
NEUTROPHILS # BLD AUTO: 8.21 10*3/MM3 (ref 1.7–7)
NEUTROPHILS NFR BLD AUTO: 81 % (ref 42.7–76)
PA ADP PRP-ACNC: 266 PRU
PLATELET # BLD AUTO: 237 10*3/MM3 (ref 140–450)
PMV BLD AUTO: 8.7 FL (ref 6–12)
POTASSIUM BLD-SCNC: 4.3 MMOL/L (ref 3.5–5.2)
RBC # BLD AUTO: 4.43 10*6/MM3 (ref 4.14–5.8)
RH BLD: POSITIVE
SODIUM BLD-SCNC: 134 MMOL/L (ref 136–145)
T&S EXPIRATION DATE: NORMAL
WBC NRBC COR # BLD: 10.13 10*3/MM3 (ref 3.4–10.8)

## 2019-05-05 PROCEDURE — 94799 UNLISTED PULMONARY SVC/PX: CPT

## 2019-05-05 PROCEDURE — 85025 COMPLETE CBC W/AUTO DIFF WBC: CPT | Performed by: INTERNAL MEDICINE

## 2019-05-05 PROCEDURE — 86900 BLOOD TYPING SEROLOGIC ABO: CPT | Performed by: PHYSICIAN ASSISTANT

## 2019-05-05 PROCEDURE — 99232 SBSQ HOSP IP/OBS MODERATE 35: CPT | Performed by: INTERNAL MEDICINE

## 2019-05-05 PROCEDURE — 85576 BLOOD PLATELET AGGREGATION: CPT | Performed by: PHYSICIAN ASSISTANT

## 2019-05-05 PROCEDURE — 25010000002 FUROSEMIDE PER 20 MG: Performed by: INTERNAL MEDICINE

## 2019-05-05 PROCEDURE — 80048 BASIC METABOLIC PNL TOTAL CA: CPT | Performed by: PHYSICIAN ASSISTANT

## 2019-05-05 PROCEDURE — 86850 RBC ANTIBODY SCREEN: CPT | Performed by: PHYSICIAN ASSISTANT

## 2019-05-05 PROCEDURE — 86901 BLOOD TYPING SEROLOGIC RH(D): CPT | Performed by: PHYSICIAN ASSISTANT

## 2019-05-05 PROCEDURE — 86923 COMPATIBILITY TEST ELECTRIC: CPT

## 2019-05-05 PROCEDURE — 82962 GLUCOSE BLOOD TEST: CPT

## 2019-05-05 PROCEDURE — 63710000001 INSULIN LISPRO (HUMAN) PER 5 UNITS: Performed by: NURSE PRACTITIONER

## 2019-05-05 PROCEDURE — 83735 ASSAY OF MAGNESIUM: CPT | Performed by: INTERNAL MEDICINE

## 2019-05-05 RX ADMIN — SODIUM CHLORIDE, PRESERVATIVE FREE 3 ML: 5 INJECTION INTRAVENOUS at 08:22

## 2019-05-05 RX ADMIN — FUROSEMIDE 40 MG: 10 INJECTION, SOLUTION INTRAMUSCULAR; INTRAVENOUS at 08:21

## 2019-05-05 RX ADMIN — CHLORHEXIDINE GLUCONATE 1 APPLICATION: 500 CLOTH TOPICAL at 17:08

## 2019-05-05 RX ADMIN — PANTOPRAZOLE SODIUM 40 MG: 40 TABLET, DELAYED RELEASE ORAL at 05:26

## 2019-05-05 RX ADMIN — CHLORHEXIDINE GLUCONATE 15 ML: 1.2 RINSE ORAL at 17:07

## 2019-05-05 RX ADMIN — INSULIN LISPRO 2 UNITS: 100 INJECTION, SOLUTION INTRAVENOUS; SUBCUTANEOUS at 12:11

## 2019-05-05 RX ADMIN — MUPIROCIN 1 APPLICATION: 20 OINTMENT TOPICAL at 17:08

## 2019-05-05 RX ADMIN — INSULIN LISPRO 3 UNITS: 100 INJECTION, SOLUTION INTRAVENOUS; SUBCUTANEOUS at 08:21

## 2019-05-05 RX ADMIN — IPRATROPIUM BROMIDE AND ALBUTEROL SULFATE 3 ML: 2.5; .5 SOLUTION RESPIRATORY (INHALATION) at 08:38

## 2019-05-05 RX ADMIN — METOPROLOL TARTRATE 12.5 MG: 25 TABLET, FILM COATED ORAL at 08:21

## 2019-05-05 RX ADMIN — INSULIN LISPRO 5 UNITS: 100 INJECTION, SOLUTION INTRAVENOUS; SUBCUTANEOUS at 17:07

## 2019-05-05 RX ADMIN — IPRATROPIUM BROMIDE AND ALBUTEROL SULFATE 3 ML: 2.5; .5 SOLUTION RESPIRATORY (INHALATION) at 20:29

## 2019-05-05 RX ADMIN — ATORVASTATIN CALCIUM 10 MG: 10 TABLET, FILM COATED ORAL at 21:07

## 2019-05-05 RX ADMIN — MELATONIN TAB 5 MG 5 MG: 5 TAB at 21:08

## 2019-05-05 RX ADMIN — NICOTINE 1 PATCH: 21 PATCH, EXTENDED RELEASE TRANSDERMAL at 08:21

## 2019-05-05 RX ADMIN — AMIODARONE HYDROCHLORIDE 400 MG: 200 TABLET ORAL at 08:21

## 2019-05-05 RX ADMIN — AMIODARONE HYDROCHLORIDE 400 MG: 200 TABLET ORAL at 21:07

## 2019-05-05 RX ADMIN — IPRATROPIUM BROMIDE AND ALBUTEROL SULFATE 3 ML: 2.5; .5 SOLUTION RESPIRATORY (INHALATION) at 13:02

## 2019-05-05 RX ADMIN — METOPROLOL TARTRATE 12.5 MG: 25 TABLET, FILM COATED ORAL at 21:08

## 2019-05-05 RX ADMIN — IPRATROPIUM BROMIDE AND ALBUTEROL SULFATE 3 ML: 2.5; .5 SOLUTION RESPIRATORY (INHALATION) at 16:36

## 2019-05-05 RX ADMIN — SODIUM CHLORIDE, PRESERVATIVE FREE 3 ML: 5 INJECTION INTRAVENOUS at 21:07

## 2019-05-05 RX ADMIN — ASPIRIN 81 MG: 81 TABLET, COATED ORAL at 08:21

## 2019-05-05 NOTE — PLAN OF CARE
Problem: Patient Care Overview  Goal: Plan of Care Review  Outcome: Ongoing (interventions implemented as appropriate)   05/05/19 4575   Coping/Psychosocial   Plan of Care Reviewed With patient   Plan of Care Review   Progress no change   OTHER   Outcome Summary Pt plan for pericardial window on Monday. No acute distress overnight.

## 2019-05-05 NOTE — PROGRESS NOTES
CTS Progress Note           LOS: 5 days     Chief Complaint:  Coronary artery disease    Subjective  VSS. No acute events overnight. Denies chest pain or shortness of breath. PFT's revealed severe COPD with an FEV1 of 0.9 L.       Objective    Vital Signs  Temp:  [98.2 °F (36.8 °C)] 98.2 °F (36.8 °C)  Heart Rate:  [80-87] 87  Resp:  [16-20] 20  BP: (111-134)/(72-84) 134/83    Physical Exam:   General Appearance: alert, appears stated age and cooperative   Lungs: scattered wheeze throughout   Heart: regular rhythm & normal rate, normal S1, S2 and no murmur, no gallop, no rub   Abdomen:  Soft, nontender, normoactive bowel sounds   Extremities:  Warm, no edema     Results     Results from last 7 days   Lab Units 05/05/19  0520   WBC 10*3/mm3 10.13   HEMOGLOBIN g/dL 11.8*   HEMATOCRIT % 36.8*   PLATELETS 10*3/mm3 237     Results from last 7 days   Lab Units 05/05/19  0520   SODIUM mmol/L 134*   POTASSIUM mmol/L 4.3   CHLORIDE mmol/L 94*   CO2 mmol/L 27.0   BUN mg/dL 15   CREATININE mg/dL 1.08   GLUCOSE mg/dL 184*   CALCIUM mg/dL 9.2       Imaging Results (last 24 hours)     ** No results found for the last 24 hours. **          Assessment    Atrial fibrillation with RVR (CMS/HCC)    History of esophageal cancer    Type 2 diabetes mellitus (CMS/HCC)    COPD (chronic obstructive pulmonary disease), Severe physiology, FEV1 0.9L (34%)    GERD without esophagitis    Elevated lactic acid level    Acute CHF (CMS/HCC)    Hyponatremia    Bilateral pleural effusion    Pericardial effusion (noninflammatory)    Acute on chronic systolic CHF (congestive heart failure) (CMS/HCC)    Left ventricular dysfunction    Coronary artery disease involving native heart with angina pectoris (CMS/HCC)      Plan   Bronchodilator therapy prn per Pulmonary  Continue preoperative preparation for tentative for left thoracotomy and pericardial window  P2 Y 12 is 266    Josesito Campos PA-C  05/05/19  9:37 AM     As above.  Will proceed with  pericardial window in the a.m.  I have discussed the procedure in detail with the patient and with his wife.  Risks of the procedure have been outlined in detail.  The patient is agreeable and would like to proceed. I have reviewed, verified, and confirmed the above history and current status.  I have examined the patient and confirmed the above physical findings.Above plan and treatment regimen discussed in detail with patient.  Options of treatment, attendant risks vs benefits, and my recommendations were discussed and all questions answered.    Zaid Pearson MD  CTSurgery  05/05/19   1:17 PM

## 2019-05-05 NOTE — PLAN OF CARE
Problem: Patient Care Overview  Goal: Plan of Care Review  Outcome: Ongoing (interventions implemented as appropriate)   05/05/19 1628   OTHER   Outcome Summary Patient scheduled for thoracotomy and pericardial windown in AM. Denies pain or discomfort. VSS. RA. Ambulated in [place].      Goal: Individualization and Mutuality  Outcome: Ongoing (interventions implemented as appropriate)      Problem: Fall Risk (Adult)  Goal: Identify Related Risk Factors and Signs and Symptoms  Outcome: Ongoing (interventions implemented as appropriate)   05/01/19 0612   Fall Risk (Adult)   Related Risk Factors (Fall Risk) fatigue/slow reaction;fear of falling;gait/mobility problems;inadequate lighting;sleep pattern alteration;environment unfamiliar   Signs and Symptoms (Fall Risk) presence of risk factors     Goal: Absence of Fall  Outcome: Ongoing (interventions implemented as appropriate)      Problem: Skin Injury Risk (Adult)  Goal: Identify Related Risk Factors and Signs and Symptoms  Outcome: Ongoing (interventions implemented as appropriate)    Goal: Skin Health and Integrity  Outcome: Ongoing (interventions implemented as appropriate)

## 2019-05-05 NOTE — PROGRESS NOTES
[facility] Medicine Services  PROGRESS NOTE    Patient Name: Francesco Arroyo  : 1945  MRN: [id number]    Date of Admission: 2019  Length of Stay: 5  Primary Care Physician: Alex Velez DO    Subjective   Subjective     CC:  Atrial fibrillation    HPI:    Feels good, denies chest pain, no cough, no fever    Review of Systems  Gen- No fevers, chills  CV- No chest pain, palpitations  Resp- No cough, dyspnea  GI- No N/V/D, abd pain      Otherwise ROS is negative except as mentioned in the HPI.    Objective   Objective     Vital Signs:   Temp:  [98.2 °F (36.8 °C)] 98.2 °F (36.8 °C)  Heart Rate:  [80-87] 87  Resp:  [16-20] 20  BP: (111-134)/(72-84) 134/83        Physical Exam:  Constitutional - non toxic, shaving at sink, thin male  HEENT-NCAT, mucous membranes moist  CV-RRR, no MRG  Resp-faint bibasilar rales  Abd-soft, non-tender, non-distended, normo active bowel sounds  Ext-No lower extremity cyanosis, clubbing or edema bilaterally  Neuro-alert and oriented, speech clear, moves all extremities   Psych-normal affect  Skin- No rash on exposed UE or LE bilaterally      Results Reviewed:  I have personally reviewed current lab, radiology, and data and agree.    Results from last 7 days   Lab Units 19  05019  1528   WBC 10*3/mm3 10.13 14.84* 8.60   < > 10.71   HEMOGLOBIN g/dL 11.8* 11.7* 12.1*   < > 13.3   HEMATOCRIT % 36.8* 37.1* 37.2*   < > 39.8   PLATELETS 10*3/mm3 237 258 254   < > 312   INR   --   --   --   --  1.17*    < > = values in this interval not displayed.     Results from last 7 days   Lab Units 19  04019  05019  1528   SODIUM mmol/L 134* 138  139 133*   < > 133*   POTASSIUM mmol/L 4.3 3.9  3.7 3.5   < > 3.9   CHLORIDE mmol/L 94* 96*  97* 95*   < > 89*   CO2 mmol/L 27.0 27.0  28.0 26.0   < > 27.0   BUN mg/dL 15 14  14 11   < > 13   CREATININE mg/dL 1.08 0.91  1.06 0.76    < > 0.85   GLUCOSE mg/dL 184* 145*  151* 205*   < > 137*   CALCIUM mg/dL 9.2 9.1  8.7 8.8   < > 9.3   ALT (SGPT) U/L  --  7  --   --  12   AST (SGOT) U/L  --  9  --   --  16    < > = values in this interval not displayed.     Estimated Creatinine Clearance: 58.6 mL/min (by C-G formula based on SCr of 1.08 mg/dL).    No results found for: BNP    Microbiology Results Abnormal     None          Imaging Results (last 24 hours)     ** No results found for the last 24 hours. **          Results for orders placed during the hospital encounter of 04/30/19   Adult Transesophageal Echo (LENKA) W/ Cont if Necessary Per Protocol    Narrative · Left ventricular systolic function is severely decreased. Estimated EF   appears to be in the range of 21 - 25%.  · There is left ventricular global hypokinesis noted.  · Left atrial cavity size is mildly dilated. There is no spontaneous echo   contrast present. The left atrial appendage was visualized through   multiple planes. Left atrial appendage was found to be singularly lobar in   nature. Doppler interrogation shows normal flow within the left atrial   appendage. No evidence of a left atrial appendage thrombus was present  · There is moderate (grade 4) plaque in the aortic arch present.  · There is a moderate (1-2cm) pericardial effusion adjacent to the right   ventricle adjacent to the right atrium. There is right atrial free wall   collapse present intermittently. No right ventricular collapse.          I have reviewed the medications:    Current Facility-Administered Medications:   •  acetaminophen (TYLENOL) tablet 650 mg, 650 mg, Oral, Q6H PRN, Justine Bueno APRN  •  acetaminophen (TYLENOL) tablet 650 mg, 650 mg, Oral, Q4H PRN, Jayme Castano MD, 650 mg at 05/03/19 2105  •  amiodarone (PACERONE) tablet 400 mg, 400 mg, Oral, Q12H, Piyush Avitia MD, 400 mg at 05/05/19 0821  •  aspirin EC tablet 81 mg, 81 mg, Oral, Daily, Justine Bueno APRN, 81 mg at 05/05/19  0821  •  atorvastatin (LIPITOR) tablet 10 mg, 10 mg, Oral, Nightly, Valentina Elam, APRN, 10 mg at 05/04/19 2048  •  chlorhexidine (PERIDEX) 0.12 % solution 15 mL, 15 mL, Mouth/Throat, Q12H, Zaid Pearson MD, 15 mL at 05/05/19 1707  •  Chlorhexidine Gluconate Cloth 2 % pads 1 application, 1 application, Topical, Q12H, Zaid Pearson MD, 1 application at 05/05/19 1708  •  dextrose (D50W) 25 g/ 50mL Intravenous Solution 25 g, 25 g, Intravenous, Q15 Min PRN, Justine Bueno APRN  •  dextrose (GLUTOSE) oral gel 15 g, 15 g, Oral, Q15 Min PRN, Justine Bueno APRN  •  fentaNYL citrate (PF) (SUBLIMAZE) 100 MCG/2ML injection  - ADS Override Pull, , , ,   •  glucagon (human recombinant) (GLUCAGEN DIAGNOSTIC) injection 1 mg, 1 mg, Subcutaneous, PRN, Justine Bueno APRN  •  insulin lispro (humaLOG) injection 0-7 Units, 0-7 Units, Subcutaneous, 4x Daily With Meals & Nightly, Justine Bueno APRN, 5 Units at 05/05/19 1707  •  ipratropium-albuterol (DUO-NEB) nebulizer solution 3 mL, 3 mL, Nebulization, Q4H PRN, Justine Bueno APRN  •  ipratropium-albuterol (DUO-NEB) nebulizer solution 3 mL, 3 mL, Nebulization, 4x Daily - RT, Kisha Salinas PA-C, 3 mL at 05/05/19 1636  •  Magnesium Sulfate 2 gram Bolus, followed by 8 gram infusion (total Mg dose 10 grams)- Mg less than or equal to 1mg/dL, 2 g, Intravenous, PRN **OR** Magnesium Sulfate 2 gram / 50mL Infusion (GIVE X 3 BAGS TO EQUAL 6GM TOTAL DOSE) - Mg 1.1 - 1.5 mg/dl, 2 g, Intravenous, PRN **OR** Magnesium Sulfate 4 gram infusion- Mg 1.6-1.9 mg/dL, 4 g, Intravenous, PRN, Rodrigo Palacios MD, Last Rate: 25 mL/hr at 05/02/19 0826, 4 g at 05/02/19 0826  •  melatonin tablet 5 mg, 5 mg, Oral, Nightly PRN, Rodrigo Palacios MD, 5 mg at 05/04/19 2049  •  methohexital (BREVITAL) 50 MG/5ML injection solution prefilled syringe  - ADS Override Pull, , , ,   •  metoprolol tartrate (LOPRESSOR) half tablet 12.5 mg, 12.5 mg, Oral, Q12H, Kisha Salinas PA-C, 12.5 mg at  05/05/19 0821  •  midazolam (VERSED) 2 MG/2ML injection  - ADS Override Pull, , , ,   •  mupirocin (BACTROBAN) 2 % nasal ointment 1 application, 1 application, Each Nare, Q12H, Zaid Pearson MD, 1 application at 05/05/19 1708  •  nicotine (NICODERM CQ) 21 MG/24HR patch 1 patch, 1 patch, Transdermal, Q24H, Justine Bueno APRN, 1 patch at 05/05/19 0821  •  pantoprazole (PROTONIX) EC tablet 40 mg, 40 mg, Oral, QAM, Justnie Bueno, NIMCO, 40 mg at 05/05/19 0526  •  Pharmacy Consult - MTM, , Does not apply, Daily, Brittany Schmidt, [facility]  •  sodium chloride 0.9 % flush 10 mL, 10 mL, Intravenous, PRN, Jamie Colon MD  •  sodium chloride 0.9 % flush 3 mL, 3 mL, Intravenous, Q12H, Justine Bueno APRN, 3 mL at 05/05/19 0822  •  sodium chloride 0.9 % flush 3-10 mL, 3-10 mL, Intravenous, PRN, Justine Bueno, APRN    amiodarone 400 mg Oral Q12H   aspirin 81 mg Oral Daily   atorvastatin 10 mg Oral Nightly   chlorhexidine 15 mL Mouth/Throat Q12H   Chlorhexidine Gluconate Cloth 1 application Topical Q12H   fentaNYL citrate (PF)      insulin lispro 0-7 Units Subcutaneous 4x Daily With Meals & Nightly   ipratropium-albuterol 3 mL Nebulization 4x Daily - RT   methohexital      metoprolol tartrate 12.5 mg Oral Q12H   midazolam      mupirocin 1 application Each Nare Q12H   nicotine 1 patch Transdermal Q24H   pantoprazole 40 mg Oral QAM   pharmacy consult - MT  Does not apply Daily   sodium chloride 3 mL Intravenous Q12H         Assessment/Plan   Assessment / Plan     Active Hospital Problems    Diagnosis POA   • **Atrial fibrillation with RVR (CMS/HCC) [I48.91] Yes   • History of esophageal cancer [Z85.01] Yes   • Type 2 diabetes mellitus (CMS/HCC) [E11.9] Yes   • COPD (chronic obstructive pulmonary disease), Severe physiology, FEV1 0.9L (34%) [J44.9] Yes   • GERD without esophagitis [K21.9] Yes   • Elevated lactic acid level [R79.89] Yes   • Acute CHF (CMS/HCC) [I50.9] Yes   • Hyponatremia [E87.1] Yes   • Bilateral  pleural effusion [J90] Yes   • Pericardial effusion (noninflammatory) [I31.3] Unknown     Added automatically from request for surgery [id number]     • Acute on chronic systolic CHF (congestive heart failure) (CMS/[facility]) [I50.23] Unknown     Added automatically from request for surgery [id number]     • Left ventricular dysfunction [I51.9] Unknown     Added automatically from request for surgery [id number]     • Coronary artery disease involving native heart with angina pectoris (CMS/[facility]) [I25.119] Unknown     Added automatically from request for surgery [id number]            Brief Hospital Course to date:  Francesco Arroyo is a 73 y.o. male presents with LE edema, hyponatremia, and afib RVR    Pericardial effusion  - possible pericariectomy/window planned    Multivessel CAD  - CABG evaluation underway - possible surgery Monday vs [facility] with PCI    Atrial fibrillation with RVR  - amiodarone and cardizem  - replace electrolytes prn    Acute CHF, systolic  - EF 25%  - lasix scheduled    Leukocytosis  - unclear etiology, patient afebrile, no cough, clinically appears non toxic, now resolved    Hyponatremia  - stable  - fluid restrictions    DMII  - a1c 7.7    COPD    Bronchitis  - completed course of doxycycline     Tobacco abuse  - counseled cessation      DVT Prophylaxis: lovenox    Disposition: I expect the patient to be discharged TBD    CODE STATUS:   Code Status and Medical Interventions:   Ordered at: 04/30/19 2041     Code Status:    CPR     Medical Interventions (Level of Support Prior to Arrest):    Full         Electronically signed by Rodrigo Palacios MD, 05/05/19, 5:25 PM.

## 2019-05-05 NOTE — PROGRESS NOTES
"  [facility] at [facility]   Inpatient Progress Note       LOS: 5 days   Patient Care Team:  Alex Velez DO as PCP - General (Family Medicine)    Chief Complaint:  Follow-up for CAD and pericardial effusion    Subjective     Interval History:   No chest pain. No dyspnea, overall feels good and is ready for surgery      Review of Systems:   Pertinent positives noted in history, exam, and assessment. Otherwise reviewed and negative.      Objective     Vitals:  Blood pressure 134/83, pulse 87, temperature 98.2 °F (36.8 °C), temperature source Oral, resp. rate 20, height 167.6 cm (66\"), weight 69.1 kg (152 lb 5.1 oz), SpO2 93 %.     Intake/Output Summary (Last 24 hours) at 5/5/2019 1135  Last data filed at 5/5/2019 0900  Gross per 24 hour   Intake 480 ml   Output --   Net 480 ml     Physical Exam   Constitutional: He is oriented to person, place, and time. He appears well-developed and well-nourished.   Neck: No JVD present. No tracheal deviation present.   Cardiovascular: Normal rate, regular rhythm, normal heart sounds and intact distal pulses. Exam reveals no friction rub.   No murmur heard.  Pulmonary/Chest: Effort normal. No respiratory distress.   Basilar crackles   Abdominal: Soft. Bowel sounds are normal. There is no tenderness.   Musculoskeletal: He exhibits no edema or deformity.   Neurological: He is alert and oriented to person, place, and time.   Skin: Skin is warm and dry.          Results Review:     I reviewed the patient's new clinical results.    Results from last 7 days   Lab Units 05/05/19  0520   WBC 10*3/mm3 10.13   HEMOGLOBIN g/dL 11.8*   HEMATOCRIT % 36.8*   PLATELETS 10*3/mm3 237     Results from last 7 days   Lab Units 05/05/19  0520 05/04/19  0401   SODIUM mmol/L 134* 138  139   POTASSIUM mmol/L 4.3 3.9  3.7   CHLORIDE mmol/L 94* 96*  97*   CO2 mmol/L 27.0 27.0  28.0   BUN mg/dL 15 14  14   CREATININE mg/dL 1.08 0.91  1.06   CALCIUM mg/dL 9.2 9.1  8.7 "   BILIRUBIN mg/dL  --  0.7   ALK PHOS U/L  --  78   ALT (SGPT) U/L  --  7   AST (SGOT) U/L  --  9   GLUCOSE mg/dL 184* 145*  151*     Results from last 7 days   Lab Units 05/05/19  0520   SODIUM mmol/L 134*   POTASSIUM mmol/L 4.3   CHLORIDE mmol/L 94*   CO2 mmol/L 27.0   BUN mg/dL 15   CREATININE mg/dL 1.08   GLUCOSE mg/dL 184*   CALCIUM mg/dL 9.2     Results from last 7 days   Lab Units 04/30/19  1528   INR  1.17*     No results found for: TROPONINI  Results from last 7 days   Lab Units 04/30/19  1528   TSH mIU/mL 1.380     Results from last 7 days   Lab Units 05/04/19  0401   CHOLESTEROL mg/dL 100   TRIGLYCERIDES mg/dL 81   HDL CHOL mg/dL 40   LDL CHOL mg/dL 44     Results from last 7 days   Lab Units 05/02/19  0633   PROBNP pg/mL 1,810.0*     LHC:  IMPRESSION:  1.  Severe proximal LAD and distal LAD stenosis  2.  Moderate severe mid left circumflex stenosis  Severe distal diffuse right PDA and posterior lateral disease  4.  LVEF 25% global    Tele:  SR    Assessment/Plan       Atrial fibrillation with RVR (CMS/[facility])    History of esophageal cancer    Type 2 diabetes mellitus (CMS/[facility])    COPD (chronic obstructive pulmonary disease), Severe physiology, FEV1 0.9L (34%)    GERD without esophagitis    Elevated lactic acid level    Acute CHF (CMS/[facility])    Hyponatremia    Bilateral pleural effusion    Pericardial effusion (noninflammatory)    Acute on chronic systolic CHF (congestive heart failure) (CMS/[facility])    Left ventricular dysfunction    Coronary artery disease involving native heart with angina pectoris (CMS/[facility])      1. Coronary artery disease  1. MVCAD by [facility]  2. Pericardial effusion  3. Cardiomyopathy, ischemic  1. EF 25%  4. MAT  5. Hyponatremia, improved    Plan:  · Patient to have pericardial window Monday with Dr. Pearson.  · Treat CAD medically for now.  If still symptomatic after pericardial window we can always consider PCI of his LAD, plus minus left circumflex, for symptom relief.  His distal RCA and  distal LAD are not candidate for revascularization.  · Hold IV diuretics at this time    Jayme Castano MD      Dictated utilizing Dragon dictation

## 2019-05-06 ENCOUNTER — APPOINTMENT (OUTPATIENT)
Dept: GENERAL RADIOLOGY | Facility: HOSPITAL | Age: 74
End: 2019-05-06

## 2019-05-06 ENCOUNTER — ANESTHESIA EVENT (OUTPATIENT)
Dept: PERIOP | Facility: HOSPITAL | Age: 74
End: 2019-05-06

## 2019-05-06 ENCOUNTER — ANESTHESIA (OUTPATIENT)
Dept: PERIOP | Facility: HOSPITAL | Age: 74
End: 2019-05-06

## 2019-05-06 LAB
ABO + RH BLD: NORMAL
BASOPHILS # BLD AUTO: 0 10*3/MM3 (ref 0–0.2)
BASOPHILS NFR BLD AUTO: 0 % (ref 0–1.5)
BH BB BLOOD EXPIRATION DATE: NORMAL
BH BB BLOOD TYPE BARCODE: 5100
BH BB DISPENSE STATUS: NORMAL
BH BB PRODUCT CODE: NORMAL
BH BB UNIT NUMBER: NORMAL
DEPRECATED RDW RBC AUTO: 51.3 FL (ref 37–54)
EOSINOPHIL # BLD AUTO: 0 10*3/MM3 (ref 0–0.4)
EOSINOPHIL NFR BLD AUTO: 0 % (ref 0.3–6.2)
ERYTHROCYTE [DISTWIDTH] IN BLOOD BY AUTOMATED COUNT: 17 % (ref 12.3–15.4)
GLUCOSE BLDC GLUCOMTR-MCNC: 203 MG/DL (ref 70–130)
GLUCOSE BLDC GLUCOMTR-MCNC: 207 MG/DL (ref 70–130)
GLUCOSE BLDC GLUCOMTR-MCNC: 222 MG/DL (ref 70–130)
GLUCOSE BLDC GLUCOMTR-MCNC: 236 MG/DL (ref 70–130)
GLUCOSE BLDC GLUCOMTR-MCNC: 256 MG/DL (ref 70–130)
GLUCOSE FLD-MCNC: 170 MG/DL
HCT VFR BLD AUTO: 37.8 % (ref 37.5–51)
HGB BLD-MCNC: 12 G/DL (ref 13–17.7)
IMM GRANULOCYTES # BLD AUTO: 0.01 10*3/MM3 (ref 0–0.05)
IMM GRANULOCYTES NFR BLD AUTO: 0.1 % (ref 0–0.5)
LDH FLD-CCNC: >1066 U/L
LYMPHOCYTES # BLD AUTO: 0.35 10*3/MM3 (ref 0.7–3.1)
LYMPHOCYTES NFR BLD AUTO: 4.1 % (ref 19.6–45.3)
MCH RBC QN AUTO: 26.5 PG (ref 26.6–33)
MCHC RBC AUTO-ENTMCNC: 31.7 G/DL (ref 31.5–35.7)
MCV RBC AUTO: 83.4 FL (ref 79–97)
MONOCYTES # BLD AUTO: 0.25 10*3/MM3 (ref 0.1–0.9)
MONOCYTES NFR BLD AUTO: 2.9 % (ref 5–12)
NEUTROPHILS # BLD AUTO: 7.97 10*3/MM3 (ref 1.7–7)
NEUTROPHILS NFR BLD AUTO: 93 % (ref 42.7–76)
PA ADP PRP-ACNC: 265 PRU
PLATELET # BLD AUTO: 260 10*3/MM3 (ref 140–450)
PMV BLD AUTO: 8.9 FL (ref 6–12)
PROT FLD-MCNC: 5 G/DL
RBC # BLD AUTO: 4.53 10*6/MM3 (ref 4.14–5.8)
SP GR FLD: 1.03
UNIT  ABO: NORMAL
UNIT  RH: NORMAL
WBC NRBC COR # BLD: 8.57 10*3/MM3 (ref 3.4–10.8)

## 2019-05-06 PROCEDURE — 85576 BLOOD PLATELET AGGREGATION: CPT | Performed by: PHYSICIAN ASSISTANT

## 2019-05-06 PROCEDURE — 85025 COMPLETE CBC W/AUTO DIFF WBC: CPT | Performed by: PHYSICIAN ASSISTANT

## 2019-05-06 PROCEDURE — 88112 CYTOPATH CELL ENHANCE TECH: CPT | Performed by: THORACIC SURGERY (CARDIOTHORACIC VASCULAR SURGERY)

## 2019-05-06 PROCEDURE — 25010000002 CEFUROXIME: Performed by: PHYSICIAN ASSISTANT

## 2019-05-06 PROCEDURE — 88305 TISSUE EXAM BY PATHOLOGIST: CPT | Performed by: THORACIC SURGERY (CARDIOTHORACIC VASCULAR SURGERY)

## 2019-05-06 PROCEDURE — 25010000002 NEOSTIGMINE 10 MG/10ML SOLUTION: Performed by: NURSE ANESTHETIST, CERTIFIED REGISTERED

## 2019-05-06 PROCEDURE — 25010000002 DEXAMETHASONE PER 1 MG: Performed by: NURSE ANESTHETIST, CERTIFIED REGISTERED

## 2019-05-06 PROCEDURE — 25010000002 FENTANYL CITRATE (PF) 100 MCG/2ML SOLUTION: Performed by: NURSE ANESTHETIST, CERTIFIED REGISTERED

## 2019-05-06 PROCEDURE — 84315 BODY FLUID SPECIFIC GRAVITY: CPT | Performed by: THORACIC SURGERY (CARDIOTHORACIC VASCULAR SURGERY)

## 2019-05-06 PROCEDURE — 25010000002 SUCCINYLCHOLINE PER 20 MG: Performed by: NURSE ANESTHETIST, CERTIFIED REGISTERED

## 2019-05-06 PROCEDURE — 82962 GLUCOSE BLOOD TEST: CPT

## 2019-05-06 PROCEDURE — 99231 SBSQ HOSP IP/OBS SF/LOW 25: CPT | Performed by: INTERNAL MEDICINE

## 2019-05-06 PROCEDURE — 25010000002 PHENYLEPHRINE PER 1 ML: Performed by: NURSE ANESTHETIST, CERTIFIED REGISTERED

## 2019-05-06 PROCEDURE — 87206 SMEAR FLUORESCENT/ACID STAI: CPT | Performed by: THORACIC SURGERY (CARDIOTHORACIC VASCULAR SURGERY)

## 2019-05-06 PROCEDURE — 33025 INCISION OF HEART SAC: CPT | Performed by: THORACIC SURGERY (CARDIOTHORACIC VASCULAR SURGERY)

## 2019-05-06 PROCEDURE — 0W9D00Z DRAINAGE OF PERICARDIAL CAVITY WITH DRAINAGE DEVICE, OPEN APPROACH: ICD-10-PCS | Performed by: THORACIC SURGERY (CARDIOTHORACIC VASCULAR SURGERY)

## 2019-05-06 PROCEDURE — 25010000002 MORPHINE PER 10 MG: Performed by: PHYSICIAN ASSISTANT

## 2019-05-06 PROCEDURE — 83615 LACTATE (LD) (LDH) ENZYME: CPT | Performed by: THORACIC SURGERY (CARDIOTHORACIC VASCULAR SURGERY)

## 2019-05-06 PROCEDURE — 87176 TISSUE HOMOGENIZATION CULTR: CPT | Performed by: THORACIC SURGERY (CARDIOTHORACIC VASCULAR SURGERY)

## 2019-05-06 PROCEDURE — 82945 GLUCOSE OTHER FLUID: CPT | Performed by: THORACIC SURGERY (CARDIOTHORACIC VASCULAR SURGERY)

## 2019-05-06 PROCEDURE — 25010000002 ONDANSETRON PER 1 MG: Performed by: PHYSICIAN ASSISTANT

## 2019-05-06 PROCEDURE — 87070 CULTURE OTHR SPECIMN AEROBIC: CPT | Performed by: THORACIC SURGERY (CARDIOTHORACIC VASCULAR SURGERY)

## 2019-05-06 PROCEDURE — 25010000002 ONDANSETRON PER 1 MG: Performed by: NURSE ANESTHETIST, CERTIFIED REGISTERED

## 2019-05-06 PROCEDURE — 87075 CULTR BACTERIA EXCEPT BLOOD: CPT | Performed by: THORACIC SURGERY (CARDIOTHORACIC VASCULAR SURGERY)

## 2019-05-06 PROCEDURE — 87116 MYCOBACTERIA CULTURE: CPT | Performed by: THORACIC SURGERY (CARDIOTHORACIC VASCULAR SURGERY)

## 2019-05-06 PROCEDURE — 87102 FUNGUS ISOLATION CULTURE: CPT | Performed by: THORACIC SURGERY (CARDIOTHORACIC VASCULAR SURGERY)

## 2019-05-06 PROCEDURE — 87205 SMEAR GRAM STAIN: CPT | Performed by: THORACIC SURGERY (CARDIOTHORACIC VASCULAR SURGERY)

## 2019-05-06 PROCEDURE — 84157 ASSAY OF PROTEIN OTHER: CPT | Performed by: THORACIC SURGERY (CARDIOTHORACIC VASCULAR SURGERY)

## 2019-05-06 PROCEDURE — 99233 SBSQ HOSP IP/OBS HIGH 50: CPT | Performed by: INTERNAL MEDICINE

## 2019-05-06 PROCEDURE — 87015 SPECIMEN INFECT AGNT CONCNTJ: CPT | Performed by: THORACIC SURGERY (CARDIOTHORACIC VASCULAR SURGERY)

## 2019-05-06 PROCEDURE — 63710000001 INSULIN LISPRO (HUMAN) PER 5 UNITS: Performed by: NURSE PRACTITIONER

## 2019-05-06 PROCEDURE — 71045 X-RAY EXAM CHEST 1 VIEW: CPT

## 2019-05-06 RX ORDER — GLYCOPYRROLATE 0.2 MG/ML
INJECTION INTRAMUSCULAR; INTRAVENOUS AS NEEDED
Status: DISCONTINUED | OUTPATIENT
Start: 2019-05-06 | End: 2019-05-06 | Stop reason: SURG

## 2019-05-06 RX ORDER — HYDRALAZINE HYDROCHLORIDE 20 MG/ML
5 INJECTION INTRAMUSCULAR; INTRAVENOUS
Status: DISCONTINUED | OUTPATIENT
Start: 2019-05-06 | End: 2019-05-06 | Stop reason: HOSPADM

## 2019-05-06 RX ORDER — FAMOTIDINE 10 MG/ML
20 INJECTION, SOLUTION INTRAVENOUS ONCE
Status: DISCONTINUED | OUTPATIENT
Start: 2019-05-06 | End: 2019-05-06

## 2019-05-06 RX ORDER — ONDANSETRON 2 MG/ML
4 INJECTION INTRAMUSCULAR; INTRAVENOUS EVERY 6 HOURS PRN
Status: DISCONTINUED | OUTPATIENT
Start: 2019-05-06 | End: 2019-05-09 | Stop reason: HOSPADM

## 2019-05-06 RX ORDER — NEOSTIGMINE METHYLSULFATE 1 MG/ML
INJECTION, SOLUTION INTRAVENOUS AS NEEDED
Status: DISCONTINUED | OUTPATIENT
Start: 2019-05-06 | End: 2019-05-06 | Stop reason: SURG

## 2019-05-06 RX ORDER — FAMOTIDINE 20 MG/1
20 TABLET, FILM COATED ORAL ONCE
Status: DISCONTINUED | OUTPATIENT
Start: 2019-05-06 | End: 2019-05-06

## 2019-05-06 RX ORDER — HYDROMORPHONE HYDROCHLORIDE 1 MG/ML
0.5 INJECTION, SOLUTION INTRAMUSCULAR; INTRAVENOUS; SUBCUTANEOUS
Status: DISCONTINUED | OUTPATIENT
Start: 2019-05-06 | End: 2019-05-06 | Stop reason: HOSPADM

## 2019-05-06 RX ORDER — MEPERIDINE HYDROCHLORIDE 25 MG/ML
12.5 INJECTION INTRAMUSCULAR; INTRAVENOUS; SUBCUTANEOUS
Status: DISCONTINUED | OUTPATIENT
Start: 2019-05-06 | End: 2019-05-06 | Stop reason: HOSPADM

## 2019-05-06 RX ORDER — HYDROCODONE BITARTRATE AND ACETAMINOPHEN 5; 325 MG/1; MG/1
1 TABLET ORAL ONCE AS NEEDED
Status: DISCONTINUED | OUTPATIENT
Start: 2019-05-06 | End: 2019-05-06 | Stop reason: HOSPADM

## 2019-05-06 RX ORDER — IPRATROPIUM BROMIDE AND ALBUTEROL SULFATE 2.5; .5 MG/3ML; MG/3ML
3 SOLUTION RESPIRATORY (INHALATION) ONCE AS NEEDED
Status: DISCONTINUED | OUTPATIENT
Start: 2019-05-06 | End: 2019-05-06 | Stop reason: HOSPADM

## 2019-05-06 RX ORDER — NALOXONE HCL 0.4 MG/ML
0.4 VIAL (ML) INJECTION AS NEEDED
Status: DISCONTINUED | OUTPATIENT
Start: 2019-05-06 | End: 2019-05-06 | Stop reason: HOSPADM

## 2019-05-06 RX ORDER — MORPHINE SULFATE 2 MG/ML
2 INJECTION, SOLUTION INTRAMUSCULAR; INTRAVENOUS EVERY 4 HOURS PRN
Status: DISCONTINUED | OUTPATIENT
Start: 2019-05-06 | End: 2019-05-08

## 2019-05-06 RX ORDER — HYDROCODONE BITARTRATE AND ACETAMINOPHEN 7.5; 325 MG/1; MG/1
1 TABLET ORAL EVERY 6 HOURS PRN
Status: DISCONTINUED | OUTPATIENT
Start: 2019-05-06 | End: 2019-05-09 | Stop reason: HOSPADM

## 2019-05-06 RX ORDER — DEXAMETHASONE SODIUM PHOSPHATE 4 MG/ML
INJECTION, SOLUTION INTRA-ARTICULAR; INTRALESIONAL; INTRAMUSCULAR; INTRAVENOUS; SOFT TISSUE AS NEEDED
Status: DISCONTINUED | OUTPATIENT
Start: 2019-05-06 | End: 2019-05-06 | Stop reason: SURG

## 2019-05-06 RX ORDER — PROMETHAZINE HYDROCHLORIDE 25 MG/1
25 TABLET ORAL ONCE AS NEEDED
Status: DISCONTINUED | OUTPATIENT
Start: 2019-05-06 | End: 2019-05-06 | Stop reason: HOSPADM

## 2019-05-06 RX ORDER — PROMETHAZINE HYDROCHLORIDE 25 MG/ML
6.25 INJECTION, SOLUTION INTRAMUSCULAR; INTRAVENOUS ONCE AS NEEDED
Status: DISCONTINUED | OUTPATIENT
Start: 2019-05-06 | End: 2019-05-06 | Stop reason: HOSPADM

## 2019-05-06 RX ORDER — SODIUM CHLORIDE 0.9 % (FLUSH) 0.9 %
1-10 SYRINGE (ML) INJECTION AS NEEDED
Status: DISCONTINUED | OUTPATIENT
Start: 2019-05-06 | End: 2019-05-06 | Stop reason: HOSPADM

## 2019-05-06 RX ORDER — PROMETHAZINE HYDROCHLORIDE 25 MG/1
25 SUPPOSITORY RECTAL ONCE AS NEEDED
Status: DISCONTINUED | OUTPATIENT
Start: 2019-05-06 | End: 2019-05-06 | Stop reason: HOSPADM

## 2019-05-06 RX ORDER — SODIUM CHLORIDE 0.9 % (FLUSH) 0.9 %
3 SYRINGE (ML) INJECTION EVERY 12 HOURS SCHEDULED
Status: DISCONTINUED | OUTPATIENT
Start: 2019-05-06 | End: 2019-05-06 | Stop reason: HOSPADM

## 2019-05-06 RX ORDER — SODIUM CHLORIDE 0.9 % (FLUSH) 0.9 %
3-10 SYRINGE (ML) INJECTION AS NEEDED
Status: DISCONTINUED | OUTPATIENT
Start: 2019-05-06 | End: 2019-05-06 | Stop reason: HOSPADM

## 2019-05-06 RX ORDER — ONDANSETRON 2 MG/ML
4 INJECTION INTRAMUSCULAR; INTRAVENOUS ONCE AS NEEDED
Status: DISCONTINUED | OUTPATIENT
Start: 2019-05-06 | End: 2019-05-06 | Stop reason: HOSPADM

## 2019-05-06 RX ORDER — SODIUM CHLORIDE 9 MG/ML
30 INJECTION, SOLUTION INTRAVENOUS CONTINUOUS
Status: DISCONTINUED | OUTPATIENT
Start: 2019-05-06 | End: 2019-05-08

## 2019-05-06 RX ORDER — ETOMIDATE 2 MG/ML
INJECTION INTRAVENOUS AS NEEDED
Status: DISCONTINUED | OUTPATIENT
Start: 2019-05-06 | End: 2019-05-06 | Stop reason: SURG

## 2019-05-06 RX ORDER — SODIUM CHLORIDE, SODIUM LACTATE, POTASSIUM CHLORIDE, CALCIUM CHLORIDE 600; 310; 30; 20 MG/100ML; MG/100ML; MG/100ML; MG/100ML
9 INJECTION, SOLUTION INTRAVENOUS CONTINUOUS
Status: DISCONTINUED | OUTPATIENT
Start: 2019-05-06 | End: 2019-05-06

## 2019-05-06 RX ORDER — ONDANSETRON 2 MG/ML
INJECTION INTRAMUSCULAR; INTRAVENOUS AS NEEDED
Status: DISCONTINUED | OUTPATIENT
Start: 2019-05-06 | End: 2019-05-06 | Stop reason: SURG

## 2019-05-06 RX ORDER — ROCURONIUM BROMIDE 10 MG/ML
INJECTION, SOLUTION INTRAVENOUS AS NEEDED
Status: DISCONTINUED | OUTPATIENT
Start: 2019-05-06 | End: 2019-05-06 | Stop reason: SURG

## 2019-05-06 RX ORDER — MAGNESIUM HYDROXIDE 1200 MG/15ML
LIQUID ORAL AS NEEDED
Status: DISCONTINUED | OUTPATIENT
Start: 2019-05-06 | End: 2019-05-06 | Stop reason: HOSPADM

## 2019-05-06 RX ORDER — FENTANYL CITRATE 50 UG/ML
INJECTION, SOLUTION INTRAMUSCULAR; INTRAVENOUS AS NEEDED
Status: DISCONTINUED | OUTPATIENT
Start: 2019-05-06 | End: 2019-05-06 | Stop reason: SURG

## 2019-05-06 RX ORDER — LIDOCAINE HYDROCHLORIDE 10 MG/ML
0.5 INJECTION, SOLUTION EPIDURAL; INFILTRATION; INTRACAUDAL; PERINEURAL ONCE AS NEEDED
Status: DISCONTINUED | OUTPATIENT
Start: 2019-05-06 | End: 2019-05-06

## 2019-05-06 RX ORDER — LABETALOL HYDROCHLORIDE 5 MG/ML
5 INJECTION, SOLUTION INTRAVENOUS
Status: DISCONTINUED | OUTPATIENT
Start: 2019-05-06 | End: 2019-05-06 | Stop reason: HOSPADM

## 2019-05-06 RX ORDER — HEPARIN SODIUM 5000 [USP'U]/ML
5000 INJECTION, SOLUTION INTRAVENOUS; SUBCUTANEOUS EVERY 12 HOURS SCHEDULED
Status: DISCONTINUED | OUTPATIENT
Start: 2019-05-07 | End: 2019-05-09 | Stop reason: HOSPADM

## 2019-05-06 RX ORDER — ONDANSETRON 4 MG/1
4 TABLET, FILM COATED ORAL EVERY 6 HOURS PRN
Status: DISCONTINUED | OUTPATIENT
Start: 2019-05-06 | End: 2019-05-09 | Stop reason: HOSPADM

## 2019-05-06 RX ORDER — SUCCINYLCHOLINE CHLORIDE 20 MG/ML
INJECTION INTRAMUSCULAR; INTRAVENOUS AS NEEDED
Status: DISCONTINUED | OUTPATIENT
Start: 2019-05-06 | End: 2019-05-06 | Stop reason: SURG

## 2019-05-06 RX ORDER — FENTANYL CITRATE 50 UG/ML
50 INJECTION, SOLUTION INTRAMUSCULAR; INTRAVENOUS
Status: DISCONTINUED | OUTPATIENT
Start: 2019-05-06 | End: 2019-05-06 | Stop reason: HOSPADM

## 2019-05-06 RX ORDER — MORPHINE SULFATE 2 MG/ML
2 INJECTION, SOLUTION INTRAMUSCULAR; INTRAVENOUS EVERY 4 HOURS PRN
Status: DISCONTINUED | OUTPATIENT
Start: 2019-05-06 | End: 2019-05-07

## 2019-05-06 RX ADMIN — ROCURONIUM BROMIDE 15 MG: 10 INJECTION INTRAVENOUS at 07:58

## 2019-05-06 RX ADMIN — PHENYLEPHRINE HYDROCHLORIDE 80 MCG: 10 INJECTION INTRAVENOUS at 07:53

## 2019-05-06 RX ADMIN — DEXAMETHASONE SODIUM PHOSPHATE 8 MG: 4 INJECTION, SOLUTION INTRAMUSCULAR; INTRAVENOUS at 07:53

## 2019-05-06 RX ADMIN — ETOMIDATE 20 MG: 2 INJECTION, SOLUTION INTRAVENOUS at 07:47

## 2019-05-06 RX ADMIN — NICOTINE 1 PATCH: 21 PATCH, EXTENDED RELEASE TRANSDERMAL at 09:29

## 2019-05-06 RX ADMIN — SODIUM CHLORIDE, POTASSIUM CHLORIDE, SODIUM LACTATE AND CALCIUM CHLORIDE: 600; 310; 30; 20 INJECTION, SOLUTION INTRAVENOUS at 07:38

## 2019-05-06 RX ADMIN — FENTANYL CITRATE 50 MCG: 50 INJECTION INTRAMUSCULAR; INTRAVENOUS at 08:54

## 2019-05-06 RX ADMIN — CHLORHEXIDINE GLUCONATE 1 APPLICATION: 500 CLOTH TOPICAL at 05:47

## 2019-05-06 RX ADMIN — METOPROLOL TARTRATE 12.5 MG: 25 TABLET, FILM COATED ORAL at 06:24

## 2019-05-06 RX ADMIN — FENTANYL CITRATE 25 MCG: 50 INJECTION, SOLUTION INTRAMUSCULAR; INTRAVENOUS at 08:30

## 2019-05-06 RX ADMIN — ROCURONIUM BROMIDE 5 MG: 10 INJECTION INTRAVENOUS at 07:47

## 2019-05-06 RX ADMIN — FENTANYL CITRATE 50 MCG: 50 INJECTION, SOLUTION INTRAMUSCULAR; INTRAVENOUS at 07:00

## 2019-05-06 RX ADMIN — CEFUROXIME 1.5 G: 1.5 INJECTION, POWDER, FOR SOLUTION INTRAVENOUS at 07:45

## 2019-05-06 RX ADMIN — METOPROLOL TARTRATE 12.5 MG: 25 TABLET, FILM COATED ORAL at 09:28

## 2019-05-06 RX ADMIN — GLYCOPYRROLATE 0.4 MG: 0.2 INJECTION, SOLUTION INTRAMUSCULAR; INTRAVENOUS at 08:15

## 2019-05-06 RX ADMIN — MUPIROCIN 1 APPLICATION: 20 OINTMENT TOPICAL at 05:47

## 2019-05-06 RX ADMIN — MELATONIN TAB 5 MG 5 MG: 5 TAB at 21:15

## 2019-05-06 RX ADMIN — METOPROLOL TARTRATE 12.5 MG: 25 TABLET, FILM COATED ORAL at 20:58

## 2019-05-06 RX ADMIN — INSULIN LISPRO 4 UNITS: 100 INJECTION, SOLUTION INTRAVENOUS; SUBCUTANEOUS at 20:58

## 2019-05-06 RX ADMIN — ONDANSETRON 4 MG: 2 INJECTION INTRAMUSCULAR; INTRAVENOUS at 08:11

## 2019-05-06 RX ADMIN — FENTANYL CITRATE 25 MCG: 50 INJECTION, SOLUTION INTRAMUSCULAR; INTRAVENOUS at 08:14

## 2019-05-06 RX ADMIN — INSULIN LISPRO 3 UNITS: 100 INJECTION, SOLUTION INTRAVENOUS; SUBCUTANEOUS at 11:36

## 2019-05-06 RX ADMIN — MORPHINE SULFATE 2 MG: 2 INJECTION, SOLUTION INTRAMUSCULAR; INTRAVENOUS at 21:16

## 2019-05-06 RX ADMIN — HYDROCODONE BITARTRATE AND ACETAMINOPHEN 1 TABLET: 7.5; 325 TABLET ORAL at 10:07

## 2019-05-06 RX ADMIN — SODIUM CHLORIDE, PRESERVATIVE FREE 3 ML: 5 INJECTION INTRAVENOUS at 20:58

## 2019-05-06 RX ADMIN — CEFUROXIME 1.5 G: 1.5 INJECTION, POWDER, FOR SOLUTION INTRAVENOUS at 23:48

## 2019-05-06 RX ADMIN — HYDROCODONE BITARTRATE AND ACETAMINOPHEN 1 TABLET: 7.5; 325 TABLET ORAL at 23:48

## 2019-05-06 RX ADMIN — ONDANSETRON 4 MG: 2 INJECTION INTRAMUSCULAR; INTRAVENOUS at 12:36

## 2019-05-06 RX ADMIN — CEFUROXIME 1.5 G: 1.5 INJECTION, POWDER, FOR SOLUTION INTRAVENOUS at 16:26

## 2019-05-06 RX ADMIN — MORPHINE SULFATE 2 MG: 2 INJECTION, SOLUTION INTRAMUSCULAR; INTRAVENOUS at 10:07

## 2019-05-06 RX ADMIN — PANTOPRAZOLE SODIUM 40 MG: 40 TABLET, DELAYED RELEASE ORAL at 05:46

## 2019-05-06 RX ADMIN — ATORVASTATIN CALCIUM 10 MG: 10 TABLET, FILM COATED ORAL at 20:58

## 2019-05-06 RX ADMIN — PHENYLEPHRINE HYDROCHLORIDE 80 MCG: 10 INJECTION INTRAVENOUS at 08:00

## 2019-05-06 RX ADMIN — AMIODARONE HYDROCHLORIDE 400 MG: 200 TABLET ORAL at 20:58

## 2019-05-06 RX ADMIN — SUCCINYLCHOLINE CHLORIDE 140 MG: 20 INJECTION, SOLUTION INTRAMUSCULAR; INTRAVENOUS; PARENTERAL at 07:47

## 2019-05-06 RX ADMIN — CHLORHEXIDINE GLUCONATE 15 ML: 1.2 RINSE ORAL at 05:46

## 2019-05-06 RX ADMIN — AMIODARONE HYDROCHLORIDE 400 MG: 200 TABLET ORAL at 09:29

## 2019-05-06 RX ADMIN — FENTANYL CITRATE 50 MCG: 50 INJECTION INTRAMUSCULAR; INTRAVENOUS at 08:44

## 2019-05-06 RX ADMIN — INSULIN LISPRO 3 UNITS: 100 INJECTION, SOLUTION INTRAVENOUS; SUBCUTANEOUS at 17:41

## 2019-05-06 RX ADMIN — NEOSTIGMINE METHYLSULFATE 2 MG: 1 INJECTION, SOLUTION INTRAVENOUS at 08:15

## 2019-05-06 RX ADMIN — HYDROCODONE BITARTRATE AND ACETAMINOPHEN 1 TABLET: 7.5; 325 TABLET ORAL at 16:25

## 2019-05-06 RX ADMIN — SODIUM CHLORIDE 30 ML/HR: 9 INJECTION, SOLUTION INTRAVENOUS at 09:30

## 2019-05-06 NOTE — PROGRESS NOTES
"Nutrition Services    Patient Name:  Francesco Arroyo  YOB: 1945  MRN: [id number]  Admit Date:  4/30/2019                      Clinical Nutrition     Nutrition Assessment  Reason for Visit:   MDR, Identified at risk by screening criteria      Patient Name: Francesco Arroyo  YOB: 1945  MRN: [id number]  Date of Encounter: 05/06/19 6:07 PM  Admission date: 4/30/2019      Comments: Pt rpt less food inatke for a few wks PTA but no signigf wt loss. ? If fluid may contribute to no loss. Rpt doing well w food. Diet order changed to cardiac consist carb per dx.       Nutrition Assessment     Hospital Problem List    Atrial fibrillation with RVR (CMS/HCC)    History of esophageal cancer    Type 2 diabetes mellitus (CMS/HCC)    COPD (chronic obstructive pulmonary disease), Severe physiology, FEV1 0.9L (34%)    GERD without esophagitis    Elevated lactic acid level    Acute CHF (CMS/HCC)    Hyponatremia    Bilateral pleural effusion    Pericardial effusion (noninflammatory)    Acute on chronic systolic CHF (congestive heart failure) (CMS/HCC)    Left ventricular dysfunction    Coronary artery disease involving native heart with angina pectoris (CMS/HCC)      PMH: He  has a past medical history of Esophageal cancer (CMS/HCC) (11/28/2016).   PSxH: He  has a past surgical history that includes Other surgical history (03/31/2008); Esophagus surgery; and Cardiac catheterization (N/A, 5/2/2019).     Other: Pericardial window; chest tube 5/6      Reported/Observed/Food/Nutrition Related History:     Pt rpt less food inatke for a few wks PTA but no signigf wt loss. ? If fluid may contribute to no loss. Rpt doing well w food. Diet order changed to cardiac consist carb per dx.       Anthropometrics     Height: 167.6 cm (66\")  Last filed wt: Weight: 67.5 kg (148 lb 12.8 oz) (05/06/19 0411)  Weight Method: Standing scale    BMI: BMI (Calculated): 25  Overweight: 25.0-29.9kg/m2     Ideal Body Weight (IBW) (kg): " 65.3    Weight Change   UBW: about 150 lb per pt    Labs reviewed     Results from last 7 days   Lab Units 05/05/19  0520 05/04/19  0401 05/03/19  0501  04/30/19  1528   GLUCOSE mg/dL 184* 145*  151* 205*   < > 137*   BUN mg/dL 15 14  14 11   < > 13   CREATININE mg/dL 1.08 0.91  1.06 0.76   < > 0.85   SODIUM mmol/L 134* 138  139 133*   < > 133*   CHLORIDE mmol/L 94* 96*  97* 95*   < > 89*   POTASSIUM mmol/L 4.3 3.9  3.7 3.5   < > 3.9   MAGNESIUM mg/dL 1.8 1.8 2.0   < >  --    ALT (SGPT) U/L  --  7  --   --  12    < > = values in this interval not displayed.     Results from last 7 days   Lab Units 05/04/19  0401 05/03/19  0501 05/01/19  0608 04/30/19  1528   ALBUMIN g/dL 3.00*  --   --  3.60   CHOLESTEROL mg/dL 100 102 98  --    TRIGLYCERIDES mg/dL 81 80 76  --          Results from last 7 days   Lab Units 05/06/19  1622 05/06/19  1110 05/06/19  0919 05/06/19  0636 05/05/19 2007 05/05/19  1634   GLUCOSE mg/dL 236* 222* 207* 203* 122 321*     Lab Results   Lab Value Date/Time    HGBA1C 7.70 (H) 05/01/2019 0608         Medications reviewed   Pertinent: protonix MgSO4      Intake/Ouptut 24 hrs (7:00AM - 6:59 AM)     Intake & Output (last day)       05/05 0701 - 05/06 0700 05/06 0701 - 05/07 0700    P.O. 720 600    I.V. (mL/kg)  521 (7.7)    Total Intake(mL/kg) 720 (10.7) 1121 (16.6)    Urine (mL/kg/hr)  0 (0)    Chest Tube  25    Total Output  25    Net +720 +1096          Urine Unmeasured Occurrence 3 x 1 x            Current Nutrition Prescription     PO: Diet Regular change to cardiac consistent carb per dx.    Intake: 69% consumption of 4 meals recorded      Nutrition Diagnosis       Problem No nutrition diagnosis at this time pending intake progression   Etiology    Signs/Symptoms        Goal:   General: Nutrition support treatment  PO: Maintain intake at least at current avg      Nutrition Intervention    Follow treatment progress, Care plan reviewed, Advise alternate selection, Interview for preferences,  Menu provided, Encourage intake      Monitoring/Evaluation:   PO intake, Pertinent labs, Symptoms      Will Continue to follow per protocol      Ladan Butler RD  Time Spent: 30 min        Electronically signed by:  Ladan Butler RD  05/06/19 6:07 PM

## 2019-05-06 NOTE — OP NOTE
Operative Report      Francesco Arroyo    : 1945  MRN: [id number]  [facility]: [id number]      Date:19      Preop Diagnosis:  Loculated pericardial effusion with early atrial and right ventricular [place] nod  Coronary artery disease (80% proximal stenosis of LAD)  Severe COPD  Cardiomyopathy (ejection fraction 20%)  Previous mediastinal radiation  Esophageal carcinoma (resected 11 years ago).    Postop Diagnosis:   Loculated pericardial effusion with early atrial and right ventricular [place] nod  Coronary artery disease (80% proximal stenosis of LAD)  Severe COPD  Cardiomyopathy (ejection fraction 20%)  Previous mediastinal radiation  Esophageal carcinoma (resected 11 years ago).      Procedure: Left anterior thoracotomy, pericardial window, drainage of left pericardial effusion      Surgeon: Zaid Pearson MD      Assistant: [place] PA-C      Indications: 73-year-old  male with multiple comorbidities and a complex past medical history presents with general malaise shortness of breath and easy fatigue.  Work-up revealed significant coronary artery disease and a pericardial effusion with indentation of the right atrium and right ventricle.  Patient is also noted to have severe COPD.  After discussion with Dr. Avitia and with Dr. Castano it was felt that coronary artery bypass grafting carried substantial risk.  We decided to proceed with pericardial window to alleviate the cardiac tamponade and treat the coronary disease medically.  If the patient becomes symptomatic stenting of the LAD is a possibility.      Operative Findings: There was about 400 cc of serous pleural fluid present on the left.  The pericardium was thickened.  There was a 300 to 350 cc loculated pericardial effusion which was drained from the inferior and posterior aspects of the pericardium.      EBL: 10 cc      Operative Narrative: The patient was brought to the operating room and prepared for surgical intervention in the usual  fashion.  A timeout was called.  The patient's identity, the proposed procedure, the availability of appropriate equipment and personnel, the patient's allergies, and administration of preoperative antibiotics was enumerated and verified.  All members of the surgical team agreed.  Following satisfactory inhalation anesthesia the left shoulder was elevated on a roll in the left anterior chest was prepped and draped in the usual fashion.  A mammary incision was carried down through the subcutaneous tissue.  The intercostal tunnel was divided in the fourth intercostal space was entered.  The lung was packed away from the wound and a retractor was placed to retract the rib cage.  The pericardium was identified and fat was dissected off the pericardium.  The phrenic nerve was identified.  A linear incision anterior to the phrenic nerve was carried down through the pericardium.  The epicardium was adherent to the pericardium in this area and utilizing tedious blunt and sharp dissection dissected off the pericardium until a large loculated pocket of fluid was encountered and drained.  A 1 inch section of pericardium was then removed and sent as previously described.  A 32 Wolof chest tube was placed in the medial aspect of the left hemithorax.  All packs were removed.  The chest was closed with #2 pericostal sutures 0 Vicryl in the muscle and fascia 3-0 Vicryl subcuticular running 4-0 Monocryl.  Sterile dressings were placed the patient was transferred to the recovery room in stable condition          Zaid Pearson MD  CTSurgery  05/06/19   9:35 AM

## 2019-05-06 NOTE — ANESTHESIA PREPROCEDURE EVALUATION
Anesthesia Evaluation     Patient summary reviewed and Nursing notes reviewed   NPO Solid Status: > 8 hours  NPO Liquid Status: > 8 hours           Airway   Mallampati: II  TM distance: >3 FB  Neck ROM: full  No difficulty expected  Dental      Pulmonary - normal exam   (+) pleural effusion, COPD,   Cardiovascular - normal exam    (+) CAD, dysrhythmias, angina, CHF,       Neuro/Psych  GI/Hepatic/Renal/Endo    (+)  GERD,  diabetes mellitus,     Musculoskeletal     Abdominal    Substance History      OB/GYN          Other      history of cancer                    Anesthesia Plan    ASA 3     general     intravenous induction   Anesthetic plan, all risks, benefits, and alternatives have been provided, discussed and informed consent has been obtained with: patient.    Plan discussed with CRNA.

## 2019-05-06 NOTE — PROGRESS NOTES
INTENSIVIST   PROGRESS NOTE     Hospital:  LOS: 6 days     Mr. Francesco Arroyo, 73 y.o. male is followed for a Chief Complaint of: Glycemic, Electrolyte, Respiratory and Medical Management      Subjective   S   Mr. Arroyo is a 72yo M who presented to [facility] with several weeks of worsening fatigue and dyspnea as well as lower extremity edema. He was admitted to Hospital Medicine and found to be in a rapid atrial rhythm. This was initially presumed to be afib but turned out to be multifocal atrial tachycardia. He had an echocardiogram performed which revealed an EF of 30% as well as a calcified pericardium with pericardial effusion. A LHC was performed which revealed multivessel disease. He had cardioversion performed which resolved his atrial arrhythmia. Dr. Pearson was consulted for consideration of CABG as well as pericardectomy.     Spirometry was performed which showed very severe COPD with an FEV1 of 0.9L which is 4% predicted. Due to his significant comorbidities, CABG has been put on hold in favor of medical management for CAD with possible stenting in the future.     Interval History:  He was taken today for a pericardial window by Dr. Pearson. There was approximately 350cc of serous pleural fluid. The pericardium was thickened and there was a loculated pericardial effusion with approximately 350cc of dark serous fluid.     He is admitted to the ICU postoperatively. He is currently on 4L nasal cannula. A chest tube is in place on the left.        The patient's relevant past medical, surgical and social history were reviewed and updated in Epic as appropriate.      ROS:   Constitutional: Negative for fever.   Respiratory: Negative for dyspnea.   Cardiovascular: Negative for chest pain.   Gastrointestinal: Negative for  nausea, vomiting and diarrhea.     Objective   O     Vitals:  Temp  Min: 97.5 °F (36.4 °C)  Max: 97.9 °F (36.6 °C)  BP  Min: 125/79  Max: 135/77  Pulse  Min: 76  Max: 84  Resp  Min: 18  Max: 20  SpO2   Min: 96 %  Max: 98 % Flow (L/min)  Min: 3  Max: 6    Intake/Ouptut 24 hrs (7:00AM - 6:59 AM)  Intake & Output (last 3 days)       05/03 0701 - 05/04 0700 05/04 0701 - 05/05 0700 05/05 0701 - 05/06 0700 05/06 0701 - 05/07 0700    P.O. 420 480 720     I.V. (mL/kg)    350 (5.2)    Total Intake(mL/kg) 420 (6.1) 480 (6.9) 720 (10.7) 350 (5.2)    Urine (mL/kg/hr) 1450 (0.9)       Total Output 1450       Net -1030 +480 +720 +350            Urine Unmeasured Occurrence  3 x 3 x           Medications (drips):    phenylephrine (PETE-SYNEPHRINE) 50 mg/250 (0.2 mg/mL) infusion    sodium chloride Last Rate: 30 mL/hr (05/06/19 0930)         Physical Examination  Telemetry:  Normal sinus rhythm.    Constitutional:  No acute distress.  Lying in bed on nasal cannula.    Cardiovascular: Normal rate, regular and rhythm. Normal heart sounds.  No murmurs, gallop or rub.   Respiratory: No respiratory distress. Normal respiratory effort.  Diminished bilaterally.  No wheezing.   Left chest tube with serosanguinous drainage. No air leak.    Abdominal:  Soft. No masses. Non-tender. No distension. No HSM.   Extremities: No digital cyanosis. No clubbing.  1+ peripheral edema.   Neurological:   Alert and Oriented to person, place, and time.              Results from last 7 days   Lab Units 05/05/19 0520 05/04/19 0401 05/03/19  0501   WBC 10*3/mm3 10.13 14.84* 8.60   HEMOGLOBIN g/dL 11.8* 11.7* 12.1*   MCV fL 83.1 84.5 82.1   PLATELETS 10*3/mm3 237 258 254     Results from last 7 days   Lab Units 05/05/19 0520 05/04/19 0401 05/03/19  0501   SODIUM mmol/L 134* 138  139 133*   POTASSIUM mmol/L 4.3 3.9  3.7 3.5   CO2 mmol/L 27.0 27.0  28.0 26.0   CREATININE mg/dL 1.08 0.91  1.06 0.76   GLUCOSE mg/dL 184* 145*  151* 205*   MAGNESIUM mg/dL 1.8 1.8 2.0     Estimated Creatinine Clearance: 57.3 mL/min (by C-G formula based on SCr of 1.08 mg/dL).  Results from last 7 days   Lab Units 05/04/19  0401 04/30/19  1528   ALK PHOS U/L 78 98   BILIRUBIN  mg/dL 0.7 0.6   ALT (SGPT) U/L 7 12   AST (SGOT) U/L 9 16       Results from last 7 days   Lab Units 05/02/19  0925   PH, ARTERIAL pH units 7.549*   PCO2, ARTERIAL mm Hg 33.6   PO2 ART mm Hg 70.5*   FIO2 % 21       Images:  Imaging Results (last 24 hours)     Procedure Component Value Units Date/Time    XR Chest 1 View [[id number]] Collected:  05/06/19 0939     Updated:  05/06/19 0939    Narrative:       EXAMINATION: XR CHEST 1 VW-      INDICATION: Postop; I31.3-Pericardial effusion (noninflammatory);  R60.0-Localized edema; I48.91-Unspecified atrial fibrillation;  I50.23-Acute on chronic systolic (congestive) heart failure; I51.9-Heart  disease, unspecified; I25.119-Atherosclerotic heart disease of native  coronary artery with unspecified angina pectoris; I30.9-Acute  pericarditis, unspecified.      COMPARISON: 04/30/2019.     FINDINGS: Portable chest reveals heart to be borderline enlarged with a  chest tube identified on the left. No pneumothorax. Increased markings  seen within the right mid and lower lung field. Degenerative change is  seen within the spine. Pulmonary vascularity is within normal limits.             Impression:       Chest tube on the left with no definite pneumothorax.  Minimal increased markings in the right midlung.     D:  05/06/2019  E:  05/06/2019                  Results: Reviewed.  I reviewed the patient's new laboratory and imaging results.  I independently reviewed the patient's new images.    Medications: Reviewed.    Assessment/Plan   A / P     Mr. Arroyo is a 74yo M with a history of esophageal cancer s/p radiation, chemotherapy, and resection in 2008, Stage IV COPD, Systolic heart failure, CAD, and a pericardial effusion who is admitted to the ICU after a pericardial window on 5/6 by Dr. Pearson.     Nutrition:   Diet Regular  Advance Directives:   Code Status and Medical Interventions:   Ordered at: 04/30/19 2041     Code Status:    CPR     Medical Interventions (Level of Support  Prior to Arrest):    Full       Active Hospital Problems    Diagnosis   • **Atrial fibrillation with RVR (CMS/[facility])   • History of esophageal cancer   • Type 2 diabetes mellitus (CMS/[facility])   • COPD (chronic obstructive pulmonary disease), Severe physiology, FEV1 0.9L (34%)   • GERD without esophagitis   • Elevated lactic acid level   • Acute CHF (CMS/[facility])   • Hyponatremia   • Bilateral pleural effusion   • Pericardial effusion (noninflammatory)     Added automatically from request for surgery [id number]     • Acute on chronic systolic CHF (congestive heart failure) (CMS/[facility])     Added automatically from request for surgery [id number]     • Left ventricular dysfunction     Added automatically from request for surgery [id number]     • Coronary artery disease involving native heart with angina pectoris (CMS/[facility])     Added automatically from request for surgery [id number]         Assessment / Plan:    1. Monitor in the ICU  2. Wean supplemental O2 as tolerated.   3. Monitor chest tube output  4. F/u pathology and cultures for pericardium and pericardial fluid  5. Continue bronchodilators  6. SSI for diabetes  7. Nicotine patch for smoking cessation  8. IS and early ambulation  9. AM labs and CXR    Plan of care and goals reviewed during interdisciplinary rounds.  I discussed the patient's findings and my recommendations with patient and nursing staff        Syeda Zavaleta, DO    Intensive Care Medicine and Pulmonary Medicine

## 2019-05-06 NOTE — ANESTHESIA PROCEDURE NOTES
Right radial Arterial Line      Patient reassessed immediately prior to procedure    Patient location during procedure: pre-op   Line placed for hemodynamic monitoring.  Performed By   CRNA: Dede Viera CRNA  Preanesthetic Checklist  Completed: patient identified, site marked, surgical consent, pre-op evaluation, timeout performed, IV checked, risks and benefits discussed and monitors and equipment checked  Arterial Line Prep   Sterile Tech: cap, gloves and sterile barriers  Prep: ChloraPrep  Patient monitoring: blood pressure monitoring, continuous pulse oximetry and EKG  Arterial Line Procedure   Laterality:right  Location:  radial artery  Catheter size: 20 G   Guidance: palpation technique  Number of attempts: 1  Successful placement: yes          Post Assessment   Dressing Type: line sutured, occlusive dressing applied, secured with tape and wrist guard applied.   Complications no  Circ/Move/Sens Assessment: normal and unchanged.   Patient Tolerance: patient tolerated the procedure well with no apparent complications

## 2019-05-06 NOTE — PROGRESS NOTES
Francesco Arroyo  [id number]  1945   LOS: 6 days   Patient Care Team:  Alex Velez DO as PCP - General (Family Medicine)    Chief Complaint:  SOB    Subjective      Patient has some mild incisional pain on the left side, but otherwise denies any chest pain, increased shortness of breath, or  dizziness.  He had some lower extremity edema, but this is improving.  He is resting comfortably in bed on oxygen therapy currently.  He does note chronic belching, abdominal fullness, and mild nausea.    Objective     Vital Sign Min/Max for last 24 hours  Temp  Min: 97.5 °F (36.4 °C)  Max: 97.9 °F (36.6 °C)   BP  Min: 125/70  Max: 135/77   Pulse  Min: 72  Max: 84   Resp  Min: 18  Max: 20   SpO2  Min: 94 %  Max: 100 %      Weight  Min: 67.5 kg (148 lb 12.8 oz)  Max: 67.5 kg (148 lb 12.8 oz)         05/04/19  2100 05/06/19  0411   Weight: 69.1 kg (152 lb 5.1 oz) 67.5 kg (148 lb 12.8 oz)         Intake/Output Summary (Last 24 hours) at 5/6/2019 1112  Last data filed at 5/6/2019 0833  Gross per 24 hour   Intake 830 ml   Output --   Net 830 ml       Physical Exam:     General Appearance:    Alert, cooperative, in no acute distress   Lungs:     Decreased breath sounds to auscultation,respirations regular, even and                   Unlabored, 2 L O2 NC    Heart:    Regular and normal rate, normal S1 and S2, no            murmur, no gallop, no rub, no click   Abdomen:  Extremities:   Soft, non-tender, bowel sounds audible x4    1+ edema, normal range of motion, SCD in place   Pulses:   Pulses palpable and equal bilaterally      Results Review:   Results from last 7 days   Lab Units 05/05/19  0520 05/04/19  0401 05/03/19  0501   SODIUM mmol/L 134* 138  139 133*   POTASSIUM mmol/L 4.3 3.9  3.7 3.5   CHLORIDE mmol/L 94* 96*  97* 95*   CO2 mmol/L 27.0 27.0  28.0 26.0   BUN mg/dL 15 14  14 11   CREATININE mg/dL 1.08 0.91  1.06 0.76   GLUCOSE mg/dL 184* 145*  151* 205*   CALCIUM mg/dL 9.2 9.1  8.7 8.8     Results  from last 7 days   Lab Units 05/05/19  0520 05/04/19  0401 05/03/19  0501   WBC 10*3/mm3 10.13 14.84* 8.60   HEMOGLOBIN g/dL 11.8* 11.7* 12.1*   HEMATOCRIT % 36.8* 37.1* 37.2*   PLATELETS 10*3/mm3 237 258 254     Results from last 7 days   Lab Units 05/04/19  0401   CHOLESTEROL mg/dL 100   TRIGLYCERIDES mg/dL 81   HDL CHOL mg/dL 40   LDL CHOL mg/dL 44     Results from last 7 days   Lab Units 05/01/19  0608   HEMOGLOBIN A1C % 7.70*     Results from last 7 days   Lab Units 05/01/19  0608 05/01/19  0155 04/30/19  1528   TROPONIN T ng/mL <0.010 <0.010 <0.010     · Chest x ray 5/6/19:    IMPRESSION:  Chest tube on the left with no definite pneumothorax.  Minimal increased markings in the right midlung. REVIEWED.    · No new ECG to review.     Medication Review: REVIEWED; DRIP = NS @30ml/hr IVFS.    Assessment/Plan      Patient is status post pericardial window/left anterior thoracotomy, and drainage of left pericardial effusion, POD #0.  400 mL serous left pleural fluid drained, 300-350 mL drained from the pericardium.  Patient is hemodynamically stable and would continue current cardiac medications.  He may benefit from some as needed Zofran as well as empiric simethicone.  Will obtain electrocardiogram in a.m.    Discussed with patient and wife in room.      Atrial fibrillation with RVR (CMS/HCC)    History of esophageal cancer    Type 2 diabetes mellitus (CMS/HCC)    COPD (chronic obstructive pulmonary disease), Severe physiology, FEV1 0.9L (34%)    GERD without esophagitis    Elevated lactic acid level    Acute CHF (CMS/HCC)    Hyponatremia    Bilateral pleural effusion    Pericardial effusion (noninflammatory)    Acute on chronic systolic CHF (congestive heart failure) (CMS/HCC)    Left ventricular dysfunction    Coronary artery disease involving native heart with angina pectoris (CMS/HCC)    Electronically signed by NIMCO Quevedo, 05/06/19, 11:33 AM.    Elio ARORA MD, FACC, personally performed the  services described in this documentation as scribed by the above named individual in my presence, and it is both accurate and complete.     05/06/19  11:12 AM

## 2019-05-06 NOTE — PLAN OF CARE
Problem: Patient Care Overview  Goal: Plan of Care Review  Outcome: Outcome(s) achieved Date Met: 05/06/19 05/06/19 0444   Coping/Psychosocial   Plan of Care Reviewed With patient   Coping/Psychosocial   Patient Agreement with Plan of Care agrees   Plan of Care Review   Progress no change   OTHER   Outcome Summary Pt. VSS. NSR. No reports of pain or discomfort. Resting throughout night. NPO. Plan for procedure this morning. Will continue to monitor.      Goal: Discharge Needs Assessment  Outcome: Ongoing (interventions implemented as appropriate)    Goal: Interprofessional Rounds/Family Conf  Outcome: Ongoing (interventions implemented as appropriate)      Problem: Fall Risk (Adult)  Goal: Absence of Fall  Outcome: Ongoing (interventions implemented as appropriate)      Problem: Skin Injury Risk (Adult)  Goal: Skin Health and Integrity  Outcome: Ongoing (interventions implemented as appropriate)      Problem: Patient Care Overview  Goal: Plan of Care Review  Outcome: Unable to achieve outcome(s) by discharge Date Met: 05/06/19    Goal: Individualization and Mutuality  Outcome: Ongoing (interventions implemented as appropriate)    Goal: Discharge Needs Assessment  Outcome: Outcome(s) achieved Date Met: 05/06/19    Goal: Interprofessional Rounds/Family Conf  Outcome: Outcome(s) achieved Date Met: 05/06/19

## 2019-05-06 NOTE — BRIEF OP NOTE
PERICARDIAL WINDOW  Progress Note    Francesco Arroyo  4/30/2019 - 5/6/2019    Pre-op Diagnosis:   Pericardial effusion (noninflammatory) [I31.3]       Post-Op Diagnosis Codes:     * Pericardial effusion (noninflammatory) [I31.3]    Procedure/CPT® Codes:      Procedure(s):  PERICARDIAL WINDOW, LEFT THORACOTOMY APPROACH    Surgeon(s):  Zaid Pearson MD    Anesthesia: General    Staff:   Circulator: Shahnaz Beckham, RN  Scrub Person: Ashley Mendoza  Nursing Assistant: Brittanie Gan  Assistant: Ravindra Morley PA    Estimated Blood Loss:10 cc    Urine Voided: * No values recorded between 5/6/2019  7:37 AM and 5/6/2019  8:28 AM *    Specimens: Pericardial fluid sent for routine culture, acid-fast and fungal smear and culture, and cytology.  The fluid was also sent for pH, specific gravity, sugar, protein, LDH, and cell count with differential.  The pericardium was sent for routine culture, acid-fast and fungal smear and culture, and for routine pathology                         Drains:   Chest Tube Left (Active)   Function -20 cm H2O 5/6/2019  9:24 AM   Air Leak/Fluctuation air leak not present 5/6/2019  9:24 AM   Drainage Description Serosanguineous 5/6/2019  9:24 AM   Dressing Status Clean;Dry;Intact 5/6/2019  9:24 AM   Securement tubing anchored to body distal to insertion site with tape 5/6/2019  8:29 AM   Safety all connections secured;all tubing connections taped;suction checked 5/6/2019  8:29 AM       Findings: There was approximately 350 cc of serous pleural fluid.  The pericardium was thickened.  There was a loculated pericardial effusion approximately 350 cc of dark serous fluid.  The fluid was collected and sent as previously described.  A large window of pericardium was also sent as previously described. A #32 [place] chest tube was left in the left hemithorax.    Complications: None      Zaid Pearson MD     Date: 5/6/2019  Time: 9:32 AM

## 2019-05-06 NOTE — ANESTHESIA POSTPROCEDURE EVALUATION
Patient: Francesco Arroyo    Procedure Summary     Date:  05/06/19 Room / Location:   [facility] / [facility]    Anesthesia Start:  0738 Anesthesia Stop:  0834    Procedure:  PERICARDIAL WINDOW, LEFT THORACOTOMY APPROACH (N/A Chest) Diagnosis:       Pericardial effusion (noninflammatory)      (Pericardial effusion (noninflammatory) [I31.3])    Surgeon:  Zaid Pearson MD Provider:  Dandy Ramirez MD    Anesthesia Type:  general ASA Status:  3          Anesthesia Type: general  Last vitals  BP   137/72   Temp   97.9 °F (36.6 °C) (05/06/19 0411)   Pulse 82   Resp 18   SpO2 96%     Post Anesthesia Care and Evaluation    Patient location during evaluation: PACU  Patient participation: complete - patient participated  Level of consciousness: sleepy but conscious  Pain score: 0  Pain management: adequate  Airway patency: patent  Anesthetic complications: No anesthetic complications  PONV Status: none  Cardiovascular status: hemodynamically stable and acceptable  Respiratory status: nonlabored ventilation, acceptable and nasal cannula  Hydration status: acceptable

## 2019-05-06 NOTE — PLAN OF CARE
Problem: Patient Care Overview  Goal: Plan of Care Review  Outcome: Ongoing (interventions implemented as appropriate)   05/06/19 1725   Coping/Psychosocial   Plan of Care Reviewed With patient;spouse   Coping/Psychosocial   Patient Agreement with Plan of Care agrees   Plan of Care Review   Progress improving   OTHER   Outcome Summary Pt transfered from PACU s/p pericardial window, 3L NC, right radial a line. Neuro intact, VSS. up to chair in the evening.        Problem: Fall Risk (Adult)  Goal: Identify Related Risk Factors and Signs and Symptoms  Outcome: Ongoing (interventions implemented as appropriate)   05/06/19 1725   Fall Risk (Adult)   Related Risk Factors (Fall Risk) gait/mobility problems;environment unfamiliar   Signs and Symptoms (Fall Risk) presence of risk factors     Goal: Absence of Fall  Outcome: Ongoing (interventions implemented as appropriate)   05/06/19 1725   Fall Risk (Adult)   Absence of Fall making progress toward outcome       Problem: Skin Injury Risk (Adult)  Goal: Identify Related Risk Factors and Signs and Symptoms  Outcome: Ongoing (interventions implemented as appropriate)   05/06/19 1725   Skin Injury Risk (Adult)   Related Risk Factors (Skin Injury Risk) advanced age;critical care admission;nutritional deficiencies;mobility impaired     Goal: Skin Health and Integrity  Outcome: Ongoing (interventions implemented as appropriate)   05/06/19 1725   Skin Injury Risk (Adult)   Skin Health and Integrity making progress toward outcome

## 2019-05-06 NOTE — ANESTHESIA PROCEDURE NOTES
Airway  Urgency: elective    Airway not difficult    General Information and Staff    Patient location during procedure: OR  CRNA: Jose Miguel Lee CRNA    Indications and Patient Condition  Indications for airway management: airway protection    Preoxygenated: yes  MILS not maintained throughout  Mask difficulty assessment: 0 - not attempted    Final Airway Details  Final airway type: endotracheal airway      Successful airway: ETT  Cuffed: yes   Successful intubation technique: direct laryngoscopy and RSI  Endotracheal tube insertion site: oral  Blade: Librado  Blade size: 3  ETT size (mm): 8.0  Cormack-Lehane Classification: grade I - full view of glottis  Placement verified by: chest auscultation and capnometry   Cuff volume (mL): 8  Measured from: lips  ETT to lips (cm): 21  Number of attempts at approach: 1    Additional Comments  Negative epigastric sounds, Breath sound equal bilaterally with symmetric chest rise and fall. Oropharynx clear, no trauma.

## 2019-05-07 ENCOUNTER — APPOINTMENT (OUTPATIENT)
Dept: GENERAL RADIOLOGY | Facility: HOSPITAL | Age: 74
End: 2019-05-07

## 2019-05-07 LAB
ANION GAP SERPL CALCULATED.3IONS-SCNC: 11 MMOL/L
BASOPHILS # BLD AUTO: 0.01 10*3/MM3 (ref 0–0.2)
BASOPHILS NFR BLD AUTO: 0.1 % (ref 0–1.5)
BUN BLD-MCNC: 15 MG/DL (ref 8–23)
BUN/CREAT SERPL: 17.4 (ref 7–25)
CALCIUM SPEC-SCNC: 8.5 MG/DL (ref 8.6–10.5)
CHLORIDE SERPL-SCNC: 97 MMOL/L (ref 98–107)
CO2 SERPL-SCNC: 24 MMOL/L (ref 22–29)
CREAT BLD-MCNC: 0.86 MG/DL (ref 0.76–1.27)
CYTO UR: NORMAL
DEPRECATED RDW RBC AUTO: 50.3 FL (ref 37–54)
EOSINOPHIL # BLD AUTO: 0 10*3/MM3 (ref 0–0.4)
EOSINOPHIL NFR BLD AUTO: 0 % (ref 0.3–6.2)
ERYTHROCYTE [DISTWIDTH] IN BLOOD BY AUTOMATED COUNT: 16.7 % (ref 12.3–15.4)
GFR SERPL CREATININE-BSD FRML MDRD: 87 ML/MIN/1.73
GLUCOSE BLD-MCNC: 226 MG/DL (ref 65–99)
GLUCOSE BLDC GLUCOMTR-MCNC: 136 MG/DL (ref 70–130)
GLUCOSE BLDC GLUCOMTR-MCNC: 172 MG/DL (ref 70–130)
GLUCOSE BLDC GLUCOMTR-MCNC: 220 MG/DL (ref 70–130)
GLUCOSE BLDC GLUCOMTR-MCNC: 301 MG/DL (ref 70–130)
HCT VFR BLD AUTO: 35.5 % (ref 37.5–51)
HGB BLD-MCNC: 11.6 G/DL (ref 13–17.7)
IMM GRANULOCYTES # BLD AUTO: 0.04 10*3/MM3 (ref 0–0.05)
IMM GRANULOCYTES NFR BLD AUTO: 0.3 % (ref 0–0.5)
LAB AP CASE REPORT: NORMAL
LAB AP CLINICAL INFORMATION: NORMAL
LYMPHOCYTES # BLD AUTO: 0.58 10*3/MM3 (ref 0.7–3.1)
LYMPHOCYTES NFR BLD AUTO: 4.8 % (ref 19.6–45.3)
MCH RBC QN AUTO: 27 PG (ref 26.6–33)
MCHC RBC AUTO-ENTMCNC: 32.7 G/DL (ref 31.5–35.7)
MCV RBC AUTO: 82.6 FL (ref 79–97)
MONOCYTES # BLD AUTO: 0.82 10*3/MM3 (ref 0.1–0.9)
MONOCYTES NFR BLD AUTO: 6.8 % (ref 5–12)
NEUTROPHILS # BLD AUTO: 10.67 10*3/MM3 (ref 1.7–7)
NEUTROPHILS NFR BLD AUTO: 88 % (ref 42.7–76)
PATH REPORT.FINAL DX SPEC: NORMAL
PATH REPORT.GROSS SPEC: NORMAL
PLATELET # BLD AUTO: 266 10*3/MM3 (ref 140–450)
PMV BLD AUTO: 8.8 FL (ref 6–12)
POTASSIUM BLD-SCNC: 4.7 MMOL/L (ref 3.5–5.2)
RBC # BLD AUTO: 4.3 10*6/MM3 (ref 4.14–5.8)
SODIUM BLD-SCNC: 132 MMOL/L (ref 136–145)
WBC NRBC COR # BLD: 12.12 10*3/MM3 (ref 3.4–10.8)

## 2019-05-07 PROCEDURE — 71045 X-RAY EXAM CHEST 1 VIEW: CPT

## 2019-05-07 PROCEDURE — 63710000001 INSULIN DETEMIR PER 5 UNITS: Performed by: INTERNAL MEDICINE

## 2019-05-07 PROCEDURE — 93010 ELECTROCARDIOGRAM REPORT: CPT | Performed by: INTERNAL MEDICINE

## 2019-05-07 PROCEDURE — 25010000002 HEPARIN (PORCINE) PER 1000 UNITS: Performed by: PHYSICIAN ASSISTANT

## 2019-05-07 PROCEDURE — 82962 GLUCOSE BLOOD TEST: CPT

## 2019-05-07 PROCEDURE — 63710000001 INSULIN LISPRO (HUMAN) PER 5 UNITS: Performed by: INTERNAL MEDICINE

## 2019-05-07 PROCEDURE — 94799 UNLISTED PULMONARY SVC/PX: CPT

## 2019-05-07 PROCEDURE — 99232 SBSQ HOSP IP/OBS MODERATE 35: CPT | Performed by: INTERNAL MEDICINE

## 2019-05-07 PROCEDURE — 93005 ELECTROCARDIOGRAM TRACING: CPT | Performed by: NURSE PRACTITIONER

## 2019-05-07 PROCEDURE — 85025 COMPLETE CBC W/AUTO DIFF WBC: CPT | Performed by: PHYSICIAN ASSISTANT

## 2019-05-07 PROCEDURE — 99231 SBSQ HOSP IP/OBS SF/LOW 25: CPT | Performed by: INTERNAL MEDICINE

## 2019-05-07 PROCEDURE — 99024 POSTOP FOLLOW-UP VISIT: CPT | Performed by: THORACIC SURGERY (CARDIOTHORACIC VASCULAR SURGERY)

## 2019-05-07 PROCEDURE — 63710000001 INSULIN LISPRO (HUMAN) PER 5 UNITS: Performed by: NURSE PRACTITIONER

## 2019-05-07 PROCEDURE — 80048 BASIC METABOLIC PNL TOTAL CA: CPT | Performed by: PHYSICIAN ASSISTANT

## 2019-05-07 RX ADMIN — INSULIN LISPRO 3 UNITS: 100 INJECTION, SOLUTION INTRAVENOUS; SUBCUTANEOUS at 21:04

## 2019-05-07 RX ADMIN — INSULIN LISPRO 5 UNITS: 100 INJECTION, SOLUTION INTRAVENOUS; SUBCUTANEOUS at 08:00

## 2019-05-07 RX ADMIN — MELATONIN TAB 5 MG 5 MG: 5 TAB at 21:01

## 2019-05-07 RX ADMIN — ASPIRIN 81 MG: 81 TABLET, COATED ORAL at 08:01

## 2019-05-07 RX ADMIN — INSULIN DETEMIR 20 UNITS: 100 INJECTION, SOLUTION SUBCUTANEOUS at 12:22

## 2019-05-07 RX ADMIN — PANTOPRAZOLE SODIUM 40 MG: 40 TABLET, DELAYED RELEASE ORAL at 06:00

## 2019-05-07 RX ADMIN — INSULIN LISPRO 5 UNITS: 100 INJECTION, SOLUTION INTRAVENOUS; SUBCUTANEOUS at 17:33

## 2019-05-07 RX ADMIN — HEPARIN SODIUM 5000 UNITS: 5000 INJECTION INTRAVENOUS; SUBCUTANEOUS at 21:01

## 2019-05-07 RX ADMIN — IPRATROPIUM BROMIDE AND ALBUTEROL SULFATE 3 ML: 2.5; .5 SOLUTION RESPIRATORY (INHALATION) at 20:55

## 2019-05-07 RX ADMIN — AMIODARONE HYDROCHLORIDE 400 MG: 200 TABLET ORAL at 08:02

## 2019-05-07 RX ADMIN — ATORVASTATIN CALCIUM 10 MG: 10 TABLET, FILM COATED ORAL at 21:02

## 2019-05-07 RX ADMIN — AMIODARONE HYDROCHLORIDE 400 MG: 200 TABLET ORAL at 21:01

## 2019-05-07 RX ADMIN — METOPROLOL TARTRATE 12.5 MG: 25 TABLET, FILM COATED ORAL at 21:01

## 2019-05-07 RX ADMIN — HYDROCODONE BITARTRATE AND ACETAMINOPHEN 1 TABLET: 7.5; 325 TABLET ORAL at 21:02

## 2019-05-07 RX ADMIN — NICOTINE 1 PATCH: 21 PATCH, EXTENDED RELEASE TRANSDERMAL at 08:01

## 2019-05-07 RX ADMIN — IPRATROPIUM BROMIDE AND ALBUTEROL SULFATE 3 ML: 2.5; .5 SOLUTION RESPIRATORY (INHALATION) at 11:06

## 2019-05-07 RX ADMIN — METOPROLOL TARTRATE 12.5 MG: 25 TABLET, FILM COATED ORAL at 08:03

## 2019-05-07 RX ADMIN — HEPARIN SODIUM 5000 UNITS: 5000 INJECTION INTRAVENOUS; SUBCUTANEOUS at 08:01

## 2019-05-07 RX ADMIN — IPRATROPIUM BROMIDE AND ALBUTEROL SULFATE 3 ML: 2.5; .5 SOLUTION RESPIRATORY (INHALATION) at 06:56

## 2019-05-07 RX ADMIN — HYDROCODONE BITARTRATE AND ACETAMINOPHEN 1 TABLET: 7.5; 325 TABLET ORAL at 12:19

## 2019-05-07 RX ADMIN — HYDROCODONE BITARTRATE AND ACETAMINOPHEN 1 TABLET: 7.5; 325 TABLET ORAL at 05:59

## 2019-05-07 NOTE — PROGRESS NOTES
Francesco Arroyo  [id number]  1945   LOS: 7 days   Patient Care Team:  Alex Velez DO as PCP - General (Family Medicine)    Chief Complaint:  SOB    Subjective      Patient denies SOB, sputum production, chest pain, edema, palpitations, or presyncope. He has been ambulating in the hallway without difficulties. He wants to go home today. Fair appetite because he does not like hospital food.     Objective     Vital Sign Min/Max for last 24 hours  Temp  Min: 96 °F (35.6 °C)  Max: 98.1 °F (36.7 °C)   BP  Min: 88/79  Max: 135/77   Pulse  Min: 65  Max: 82   Resp  Min: 18  Max: 22   SpO2  Min: 87 %  Max: 100 %               05/04/19  2100 05/06/19  0411   Weight: 69.1 kg (152 lb 5.1 oz) 67.5 kg (148 lb 12.8 oz)         Intake/Output Summary (Last 24 hours) at 5/7/2019 0752  Last data filed at 5/7/2019 0600  Gross per 24 hour   Intake 1535 ml   Output 600 ml   Net 935 ml       Physical Exam:     General Appearance:    Alert, cooperative, in no acute distress   Lungs:     Decreased breath sounds to auscultation,respirations regular, even and                   Unlabored, 3 L o2 NC    Heart:    Regular and normal rate, normal S1 and S2, no            murmur, no gallop, no rub, no click   Abdomen:  Extremities:   Soft, non-tender, bowel sounds audible x4    1+ edema, normal range of motion   Pulses:   Pulses palpable and equal bilaterally      Results Review:   Results from last 7 days   Lab Units 05/07/19  0228 05/05/19  0520 05/04/19  0401   SODIUM mmol/L 132* 134* 138  139   POTASSIUM mmol/L 4.7 4.3 3.9  3.7   CHLORIDE mmol/L 97* 94* 96*  97*   CO2 mmol/L 24.0 27.0 27.0  28.0   BUN mg/dL 15 15 14  14   CREATININE mg/dL 0.86 1.08 0.91  1.06   GLUCOSE mg/dL 226* 184* 145*  151*   CALCIUM mg/dL 8.5* 9.2 9.1  8.7     Results from last 7 days   Lab Units 05/07/19  0228 05/06/19  1702 05/05/19  0520   WBC 10*3/mm3 12.12* 8.57 10.13   HEMOGLOBIN g/dL 11.6* 12.0* 11.8*   HEMATOCRIT % 35.5* 37.8 36.8*    PLATELETS 10*3/mm3 266 260 237     Results from last 7 days   Lab Units 05/04/19  0401   CHOLESTEROL mg/dL 100   TRIGLYCERIDES mg/dL 81   HDL CHOL mg/dL 40   LDL CHOL mg/dL 44     Results from last 7 days   Lab Units 05/01/19  0608   HEMOGLOBIN A1C % 7.70*     Results from last 7 days   Lab Units 05/01/19  0608 05/01/19  0155 04/30/19  1528   TROPONIN T ng/mL <0.010 <0.010 <0.010   · ECG 5/7/19:  Normal sinus rhythm  Right bundle branch block  Abnormal ECG  When compared with ECG of 02-MAY-2019 05:29,  premature atrial complexes are no longer present  Nonspecific T wave abnormality, worse in Inferior leads  Nonspecific T wave abnormality now evident in Lateral leads, QTc 501ms    · Chest x ray 5/6/19:  Chest tube on the left with no definite pneumothorax.  Minimal increased markings in the right midlung.    · Chest x ray 5/7/19:  Borderline cardiomegaly with left chest tube in place in the absence of overt residual pleural effusion or pulmonary vascular congestion with diffuse nonspecific interstitial infiltrate with formal official report pending.     Medication Review: REVIEWED; NO DRIPS.    Assessment/Plan      Patient is status post pericardial window/left anterior thoracotomy, and drainage of left pericardial effusion, POD #1. Patient will transfer to telemetry today. He will need follow up in 4 - 6 weeks after discharge with [facility]. Continue current cardiac medications.      Atrial fibrillation with RVR (CMS/HCC)    History of esophageal cancer    Type 2 diabetes mellitus (CMS/HCC)    COPD (chronic obstructive pulmonary disease), Severe physiology, FEV1 0.9L (34%)    GERD without esophagitis    Elevated lactic acid level    Acute CHF (CMS/HCC)    Hyponatremia    Bilateral pleural effusion    Pericardial effusion (noninflammatory)    Acute on chronic systolic CHF (congestive heart failure) (CMS/HCC)    Left ventricular dysfunction    Coronary artery disease involving native heart with angina pectoris  (CMS/[facility])    05/07/19  7:52 AM

## 2019-05-07 NOTE — PROGRESS NOTES
Continued Stay Note  [facility]     Patient Name: Francesco Arroyo  MRN: [id number]  Today's Date: 5/7/2019    Admit Date: 4/30/2019    Discharge Plan     Row Name 05/07/19 1227       Plan    Plan Home with spouse    Patient/Family in Agreement with Plan  -- Patient    Plan Comments Remains in ICU with plan to transfer to floor bed.  Met with patient at bedside, reports his discharge plan remains to return home with hi wife.  Reports no needs at this time, anxious to go home.  Elda Astorga, Ext. [phone]    Final Discharge Disposition Code 01 - home or self-care        Discharge Codes    No documentation.       Expected Discharge Date and Time     Expected Discharge Date Expected Discharge Time    May 4, 2019             LIAM Donaldson

## 2019-05-07 NOTE — PROGRESS NOTES
Multidisciplinary Rounds    Time: 20min  Patient Name: Francesco Arroyo  Date of Encounter: 05/07/19 9:08 AM  MRN: [id number]  Admission date: 4/30/2019      Reason for visit: MDR. RD to continue to follow per protocol.     Additional information obtained during MDR: 61% PO intake of past 9 documented meals. Insulin being adjusted. Pt with orders to be transferred to the floor.     Current diet: Diet Regular; Cardiac, Consistent Carbohydrate      Intervention:  Follow treatment plan  Care plan reviewed    Follow up:   Per protocol      Fior Erazo MS RD/MAC CNSC  9:08 AM

## 2019-05-07 NOTE — PLAN OF CARE
Problem: Patient Care Overview  Goal: Plan of Care Review  Outcome: Ongoing (interventions implemented as appropriate)   05/07/19 0543   Coping/Psychosocial   Plan of Care Reviewed With patient   Coping/Psychosocial   Patient Agreement with Plan of Care agrees   Plan of Care Review   Progress improving   OTHER   Outcome Summary pt. alert/oriented x 4. Pt. on 3L NC sats >94%. Maintenance fluids currently infusing. Pt. up in chair. Will continue monitor. 05/07/2019 0546       Problem: Fall Risk (Adult)  Goal: Identify Related Risk Factors and Signs and Symptoms  Outcome: Ongoing (interventions implemented as appropriate)   05/07/19 0543   Fall Risk (Adult)   Related Risk Factors (Fall Risk) slippery/uneven surfaces;environment unfamiliar   Signs and Symptoms (Fall Risk) presence of risk factors     Goal: Absence of Fall  Outcome: Ongoing (interventions implemented as appropriate)   05/07/19 0543   Fall Risk (Adult)   Absence of Fall making progress toward outcome       Problem: Skin Injury Risk (Adult)  Goal: Identify Related Risk Factors and Signs and Symptoms  Outcome: Ongoing (interventions implemented as appropriate)   05/07/19 0543   Skin Injury Risk (Adult)   Related Risk Factors (Skin Injury Risk) advanced age;critical care admission     Goal: Skin Health and Integrity  Outcome: Ongoing (interventions implemented as appropriate)   05/07/19 0543   Skin Injury Risk (Adult)   Skin Health and Integrity making progress toward outcome

## 2019-05-07 NOTE — PLAN OF CARE
Problem: Patient Care Overview  Goal: Plan of Care Review  Outcome: Ongoing (interventions implemented as appropriate)      Problem: Patient Care Overview  Goal: Plan of Care Review  Outcome: Ongoing (interventions implemented as appropriate)   05/07/19 0543 05/07/19 1513   Coping/Psychosocial   Plan of Care Reviewed With --  patient   Plan of Care Review   Progress --  improving   OTHER   Outcome Summary pt. alert/oriented x 4. Pt. on 4L NC sats >94%. Maintenance fluids currently infusing. Pt. up in chair. Will continue monitor. --

## 2019-05-07 NOTE — PROGRESS NOTES
INTENSIVIST   PROGRESS NOTE     Hospital:  LOS: 7 days     Mr. Francesco Arroyo, 73 y.o. male is followed for a Chief Complaint of: Glycemic, Electrolyte, Respiratory and Medical Management      Subjective   S   Mr. Arroyo is a 72yo M who presented to [facility] with several weeks of worsening fatigue and dyspnea as well as lower extremity edema. He was admitted to Hospital Medicine and found to be in a rapid atrial rhythm. This was initially presumed to be afib but turned out to be multifocal atrial tachycardia. He had an echocardiogram performed which revealed an EF of 30% as well as a calcified pericardium with pericardial effusion. A LHC was performed which revealed multivessel disease. He had cardioversion performed which resolved his atrial arrhythmia. Dr. Pearson was consulted for consideration of CABG as well as pericardectomy.     Spirometry was performed which showed very severe COPD with an FEV1 of 0.9L which is 34% predicted. Due to his significant comorbidities, CABG has been put on hold in favor of medical management for CAD with possible stenting in the future.     He was taken for a pericardial window by Dr. Pearson on 5/6. There was approximately 350cc of serous pleural fluid. The pericardium was thickened and there was a loculated pericardial effusion with approximately 350cc of dark serous fluid.    Interval History:  No events overnight. He wants to know when he can go home as he didn't sleep well.        The patient's relevant past medical, surgical and social history were reviewed and updated in Epic as appropriate.      ROS:   Constitutional: Negative for fever.   Respiratory: Negative for dyspnea.   Cardiovascular: Negative for chest pain.   Gastrointestinal: Negative for  nausea, vomiting and diarrhea.     Objective   O     Vitals:  Temp  Min: 96 °F (35.6 °C)  Max: 98.1 °F (36.7 °C)  BP  Min: 88/79  Max: 127/74  Pulse  Min: 65  Max: 79  Resp  Min: 16  Max: 22  SpO2  Min: 87 %  Max: 99 % Flow (L/min)   Min: 3  Max: 4    Intake/Ouptut 24 hrs (7:00AM - 6:59 AM)  Intake & Output (last 3 days)       05/04 0701 - 05/05 0700 05/05 0701 - 05/06 0700 05/06 0701 - 05/07 0700 05/07 0701 - 05/08 0700    P.O. 480 720 600     I.V. (mL/kg)   835 (12.4)     IV Piggyback   100     Total Intake(mL/kg) 480 (6.9) 720 (10.7) 1535 (22.7)     Urine (mL/kg/hr)   550 (0.3)     Chest Tube   50     Total Output   600     Net +480 +720 +935             Urine Unmeasured Occurrence 3 x 3 x 1 x           Medications (drips):    phenylephrine (PETE-SYNEPHRINE) 50 mg/250 (0.2 mg/mL) infusion    sodium chloride Last Rate: 30 mL/hr (05/06/19 0930)         Physical Examination  Telemetry:  Normal sinus rhythm.    Constitutional:  No acute distress.  Sitting in a chair. Eating breakfast.    Cardiovascular: Normal rate, regular and rhythm. Normal heart sounds.  No murmurs, gallop or rub.   Respiratory: No respiratory distress. Normal respiratory effort.  Diminished bilaterally.  No wheezing.    Abdominal:  Soft. No masses. Non-tender. No distension. No HSM.   Extremities: No digital cyanosis. No clubbing.  1+ peripheral edema.   Neurological:   Alert and Oriented to person, place, and time.              Results from last 7 days   Lab Units 05/07/19 0228 05/06/19  1702 05/05/19  0520   WBC 10*3/mm3 12.12* 8.57 10.13   HEMOGLOBIN g/dL 11.6* 12.0* 11.8*   MCV fL 82.6 83.4 83.1   PLATELETS 10*3/mm3 266 260 237     Results from last 7 days   Lab Units 05/07/19  0228 05/05/19  0520 05/04/19  0401 05/03/19  0501   SODIUM mmol/L 132* 134* 138  139 133*   POTASSIUM mmol/L 4.7 4.3 3.9  3.7 3.5   CO2 mmol/L 24.0 27.0 27.0  28.0 26.0   CREATININE mg/dL 0.86 1.08 0.91  1.06 0.76   GLUCOSE mg/dL 226* 184* 145*  151* 205*   MAGNESIUM mg/dL  --  1.8 1.8 2.0     Estimated Creatinine Clearance: 71.9 mL/min (by C-G formula based on SCr of 0.86 mg/dL).  Results from last 7 days   Lab Units 05/04/19  0401 04/30/19  1528   ALK PHOS U/L 78 98   BILIRUBIN mg/dL 0.7 0.6    ALT (SGPT) U/L 7 12   AST (SGOT) U/L 9 16       Results from last 7 days   Lab Units 05/02/19  0925   PH, ARTERIAL pH units 7.549*   PCO2, ARTERIAL mm Hg 33.6   PO2 ART mm Hg 70.5*   FIO2 % 21       Images:  Imaging Results (last 24 hours)     Procedure Component Value Units Date/Time    XR Chest 1 View [[id number]] Collected:  05/07/19 1026     Updated:  05/07/19 1027    Narrative:       EXAMINATION: XR CHEST 1 VW-05/07/2019:      INDICATION: Postop; I31.3-Pericardial effusion (noninflammatory);  R60.0-Localized edema; I48.91-Unspecified atrial fibrillation;  I50.23-Acute on chronic systolic (congestive) heart failure; I51.9-Heart  disease, unspecified; I25.119-Atherosclerotic heart disease of native  coronary artery with unspecified angina pectoris; I30.9-Acute  pericarditis, unspecified.      COMPARISON: 05/06/2019.     FINDINGS:  The heart is upper normal size. The vasculature appears  cephalized. The patient's diffuse interstitial disease appears  unchanged. No lung consolidation is seen. Left thoracotomy tube remains  in place. There is no evidence of pneumothorax.           Impression:       Stable post left thoracotomy tube chest, with no evidence of  pneumothorax. Stable diffuse pulmonary interstitial disease.     D:  05/07/2019  E:  05/07/2019             XR Chest 1 View [[id number]] Collected:  05/06/19 0939     Updated:  05/06/19 1601    Narrative:       EXAMINATION: XR CHEST 1 VW-      INDICATION: Postop; I31.3-Pericardial effusion (noninflammatory);  R60.0-Localized edema; I48.91-Unspecified atrial fibrillation;  I50.23-Acute on chronic systolic (congestive) heart failure; I51.9-Heart  disease, unspecified; I25.119-Atherosclerotic heart disease of native  coronary artery with unspecified angina pectoris; I30.9-Acute  pericarditis, unspecified.      COMPARISON: 04/30/2019.     FINDINGS: Portable chest reveals heart to be borderline enlarged with a  chest tube identified on the left. No pneumothorax.  Increased markings  seen within the right mid and lower lung field. Degenerative change is  seen within the spine. Pulmonary vascularity is within normal limits.             Impression:       Chest tube on the left with no definite pneumothorax.  Minimal increased markings in the right midlung.     D:  05/06/2019  E:  05/06/2019     This report was finalized on 5/6/2019 3:58 PM by Dr. Helena Ortega MD.               Results: Reviewed.  I reviewed the patient's new laboratory and imaging results.  I independently reviewed the patient's new images.    Medications: Reviewed.    Assessment/Plan   A / P     Mr. Arroyo is a 72yo M with a history of esophageal cancer s/p radiation, chemotherapy, and resection in 2008, Stage IV COPD, Systolic heart failure, CAD, and a pericardial effusion who is admitted to the ICU after a pericardial window on 5/6 by Dr. Pearson.     Nutrition:   Diet Regular; Cardiac, Consistent Carbohydrate  Advance Directives:   Code Status and Medical Interventions:   Ordered at: 04/30/19 2041     Code Status:    CPR     Medical Interventions (Level of Support Prior to Arrest):    Full       Active Hospital Problems    Diagnosis   • **Atrial fibrillation with RVR (CMS/HCC)   • History of esophageal cancer   • Type 2 diabetes mellitus (CMS/HCC)   • COPD (chronic obstructive pulmonary disease), Severe physiology, FEV1 0.9L (34%)   • GERD without esophagitis   • Elevated lactic acid level   • Acute CHF (CMS/HCC)   • Hyponatremia   • Bilateral pleural effusion   • Pericardial effusion (noninflammatory)     Added automatically from request for surgery [id number]     • Acute on chronic systolic CHF (congestive heart failure) (CMS/HCC)     Added automatically from request for surgery [id number]     • Left ventricular dysfunction     Added automatically from request for surgery [id number]     • Coronary artery disease involving native heart with angina pectoris (CMS/HCC)     Added automatically from request for  surgery [id number]         Assessment / Plan:    1. Wean supplemental O2 as tolerated.   2. Chest tube out this AM per CTS  3. F/u pathology and cultures for pericardium and pericardial fluid  4. Continue bronchodilators  5. Adjust insulin. Will likely need to add basal coverage.   6. Nicotine patch for smoking cessation  7. IS and ambulation  8. AM labs  9. Okay to transfer to telemetry when a bed is available    Plan of care and goals reviewed during interdisciplinary rounds.  I discussed the patient's findings and my recommendations with patient and nursing staff        Syeda Zavaleta, DO    Intensive Care Medicine and Pulmonary Medicine

## 2019-05-07 NOTE — PROGRESS NOTES
Pt. Referred for Phase II Cardiac Rehab. Staff discussed benefits of exercise, program protocol, and educational material provided. Teach back verified.  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 Patient to call staff if he changes his mind. Teach back verified.

## 2019-05-07 NOTE — PROGRESS NOTES
CTS Progress Note      POD 1 s/p left anterior thoracotomy, pericardial window.       LOS: 7 days   Patient Care Team:  Alex Velez DO as PCP - General (Family Medicine)    Subjective  Wants to go home, now  Pain under good control  No shortness of breath  Hemodynamically stable    CC: Status post left anterior thoracotomy, pericardial window    Objective    Vital Signs  Temp:  [96 °F (35.6 °C)-98.1 °F (36.7 °C)] 98.1 °F (36.7 °C)  Heart Rate:  [65-82] 65  Resp:  [18-22] 20  BP: ()/(56-84) 94/56  Arterial Line BP: ()/(47-71) 97/47    Physical Exam:   General Appearance: alert, appears stated age and cooperative   Lungs: clear to auscultation, respirations regular, respirations even and respirations unlabored   Heart: regular rhythm & normal rate, normal S1, S2, no murmur, no gallop, no rub and no click   Skin: Warm, dry, incision c/d/i     Results   Results from last 7 days   Lab Units 05/07/19 0228   WBC 10*3/mm3 12.12*   HEMOGLOBIN g/dL 11.6*   HEMATOCRIT % 35.5*   PLATELETS 10*3/mm3 266     Results from last 7 days   Lab Units 05/07/19 0228   SODIUM mmol/L 132*   POTASSIUM mmol/L 4.7   CHLORIDE mmol/L 97*   CO2 mmol/L 24.0   BUN mg/dL 15   CREATININE mg/dL 0.86   GLUCOSE mg/dL 226*   CALCIUM mg/dL 8.5*           Imaging Results (last 24 hours)     Procedure Component Value Units Date/Time    XR Chest 1 View [[id number]] Updated:  05/07/19 0501    XR Chest 1 View [[id number]] Collected:  05/06/19 0939     Updated:  05/06/19 1601    Narrative:       EXAMINATION: XR CHEST 1 VW-      INDICATION: Postop; I31.3-Pericardial effusion (noninflammatory);  R60.0-Localized edema; I48.91-Unspecified atrial fibrillation;  I50.23-Acute on chronic systolic (congestive) heart failure; I51.9-Heart  disease, unspecified; I25.119-Atherosclerotic heart disease of native  coronary artery with unspecified angina pectoris; I30.9-Acute  pericarditis, unspecified.      COMPARISON: 04/30/2019.     FINDINGS:  Portable chest reveals heart to be borderline enlarged with a  chest tube identified on the left. No pneumothorax. Increased markings  seen within the right mid and lower lung field. Degenerative change is  seen within the spine. Pulmonary vascularity is within normal limits.             Impression:       Chest tube on the left with no definite pneumothorax.  Minimal increased markings in the right midlung.     D:  05/06/2019  E:  05/06/2019     This report was finalized on 5/6/2019 3:58 PM by Dr. Helena Ortega MD.             Assessment      Atrial fibrillation with RVR (CMS/HCC)    History of esophageal cancer    Type 2 diabetes mellitus (CMS/HCC)    COPD (chronic obstructive pulmonary disease), Severe physiology, FEV1 0.9L (34%)    GERD without esophagitis    Elevated lactic acid level    Acute CHF (CMS/HCC)    Hyponatremia    Bilateral pleural effusion    Pericardial effusion (noninflammatory)    Acute on chronic systolic CHF (congestive heart failure) (CMS/HCC)    Left ventricular dysfunction    Coronary artery disease involving native heart with angina pectoris (CMS/HCC)  Chest tube with 40 cc out previous 18 hours, no air leak  Anxious for discharge home      Plan   We will transfer to telemetry this morning  Will DC chest tube.    RED Goldstein  05/07/19  6:40 AM    Stable postoperative course.  Cultures are no growth to this point.  Chest x-ray is clear.  To new current treatment regimen.  Probably can be discharged and 2-3 days. I have reviewed, verified, and confirmed the above history and current status.  I have examined the patient and confirmed the above physical findings.Above plan and treatment regimen discussed in detail with patient.  Options of treatment, attendant risks vs benefits, and my recommendations were discussed and all questions answered.    Zaid Pearson MD  [facility]gery  05/07/19   11:21 AM

## 2019-05-08 ENCOUNTER — APPOINTMENT (OUTPATIENT)
Dept: CARDIOLOGY | Facility: HOSPITAL | Age: 74
End: 2019-05-08

## 2019-05-08 LAB
ANION GAP SERPL CALCULATED.3IONS-SCNC: 12 MMOL/L
BUN BLD-MCNC: 16 MG/DL (ref 8–23)
BUN/CREAT SERPL: 16.8 (ref 7–25)
CALCIUM SPEC-SCNC: 9.1 MG/DL (ref 8.6–10.5)
CHLORIDE SERPL-SCNC: 93 MMOL/L (ref 98–107)
CO2 SERPL-SCNC: 25 MMOL/L (ref 22–29)
CREAT BLD-MCNC: 0.95 MG/DL (ref 0.76–1.27)
GFR SERPL CREATININE-BSD FRML MDRD: 77 ML/MIN/1.73
GLUCOSE BLD-MCNC: 124 MG/DL (ref 65–99)
GLUCOSE BLDC GLUCOMTR-MCNC: 129 MG/DL (ref 70–130)
GLUCOSE BLDC GLUCOMTR-MCNC: 138 MG/DL (ref 70–130)
GLUCOSE BLDC GLUCOMTR-MCNC: 152 MG/DL (ref 70–130)
GLUCOSE BLDC GLUCOMTR-MCNC: 213 MG/DL (ref 70–130)
LAB AP CASE REPORT: NORMAL
MAGNESIUM SERPL-MCNC: 1.8 MG/DL (ref 1.6–2.4)
PATH REPORT.FINAL DX SPEC: NORMAL
PHOSPHATE SERPL-MCNC: 3.6 MG/DL (ref 2.5–4.5)
POTASSIUM BLD-SCNC: 4.5 MMOL/L (ref 3.5–5.2)
SODIUM BLD-SCNC: 130 MMOL/L (ref 136–145)

## 2019-05-08 PROCEDURE — 83735 ASSAY OF MAGNESIUM: CPT | Performed by: INTERNAL MEDICINE

## 2019-05-08 PROCEDURE — 94799 UNLISTED PULMONARY SVC/PX: CPT

## 2019-05-08 PROCEDURE — 63710000001 INSULIN DETEMIR PER 5 UNITS: Performed by: INTERNAL MEDICINE

## 2019-05-08 PROCEDURE — 25010000002 SULFUR HEXAFLUORIDE MICROSPH 60.7-25 MG RECONSTITUTED SUSPENSION: Performed by: INTERNAL MEDICINE

## 2019-05-08 PROCEDURE — 84100 ASSAY OF PHOSPHORUS: CPT | Performed by: INTERNAL MEDICINE

## 2019-05-08 PROCEDURE — 99024 POSTOP FOLLOW-UP VISIT: CPT | Performed by: THORACIC SURGERY (CARDIOTHORACIC VASCULAR SURGERY)

## 2019-05-08 PROCEDURE — 82962 GLUCOSE BLOOD TEST: CPT

## 2019-05-08 PROCEDURE — 99024 POSTOP FOLLOW-UP VISIT: CPT | Performed by: PHYSICIAN ASSISTANT

## 2019-05-08 PROCEDURE — 93308 TTE F-UP OR LMTD: CPT

## 2019-05-08 PROCEDURE — 63710000001 INSULIN LISPRO (HUMAN) PER 5 UNITS: Performed by: INTERNAL MEDICINE

## 2019-05-08 PROCEDURE — 99232 SBSQ HOSP IP/OBS MODERATE 35: CPT | Performed by: INTERNAL MEDICINE

## 2019-05-08 PROCEDURE — 80048 BASIC METABOLIC PNL TOTAL CA: CPT | Performed by: INTERNAL MEDICINE

## 2019-05-08 PROCEDURE — 93308 TTE F-UP OR LMTD: CPT | Performed by: INTERNAL MEDICINE

## 2019-05-08 PROCEDURE — 25010000002 HEPARIN (PORCINE) PER 1000 UNITS: Performed by: PHYSICIAN ASSISTANT

## 2019-05-08 PROCEDURE — 99233 SBSQ HOSP IP/OBS HIGH 50: CPT | Performed by: INTERNAL MEDICINE

## 2019-05-08 RX ORDER — AMIODARONE HYDROCHLORIDE 200 MG/1
200 TABLET ORAL EVERY 12 HOURS SCHEDULED
Status: DISCONTINUED | OUTPATIENT
Start: 2019-05-08 | End: 2019-05-09 | Stop reason: HOSPADM

## 2019-05-08 RX ADMIN — ONDANSETRON HYDROCHLORIDE 4 MG: 4 TABLET, FILM COATED ORAL at 08:34

## 2019-05-08 RX ADMIN — ATORVASTATIN CALCIUM 10 MG: 10 TABLET, FILM COATED ORAL at 20:36

## 2019-05-08 RX ADMIN — METOPROLOL TARTRATE 12.5 MG: 25 TABLET, FILM COATED ORAL at 20:36

## 2019-05-08 RX ADMIN — ACETAMINOPHEN 650 MG: 325 TABLET ORAL at 01:02

## 2019-05-08 RX ADMIN — ASPIRIN 81 MG: 81 TABLET, COATED ORAL at 08:34

## 2019-05-08 RX ADMIN — SULFUR HEXAFLUORIDE 3 ML: KIT at 15:20

## 2019-05-08 RX ADMIN — AMIODARONE HYDROCHLORIDE 200 MG: 200 TABLET ORAL at 20:36

## 2019-05-08 RX ADMIN — METOPROLOL TARTRATE 12.5 MG: 25 TABLET, FILM COATED ORAL at 08:34

## 2019-05-08 RX ADMIN — HYDROCODONE BITARTRATE AND ACETAMINOPHEN 1 TABLET: 7.5; 325 TABLET ORAL at 20:36

## 2019-05-08 RX ADMIN — HEPARIN SODIUM 5000 UNITS: 5000 INJECTION INTRAVENOUS; SUBCUTANEOUS at 08:34

## 2019-05-08 RX ADMIN — PANTOPRAZOLE SODIUM 40 MG: 40 TABLET, DELAYED RELEASE ORAL at 06:20

## 2019-05-08 RX ADMIN — SODIUM CHLORIDE, PRESERVATIVE FREE 3 ML: 5 INJECTION INTRAVENOUS at 08:33

## 2019-05-08 RX ADMIN — IPRATROPIUM BROMIDE AND ALBUTEROL SULFATE 3 ML: 2.5; .5 SOLUTION RESPIRATORY (INHALATION) at 15:36

## 2019-05-08 RX ADMIN — HEPARIN SODIUM 5000 UNITS: 5000 INJECTION INTRAVENOUS; SUBCUTANEOUS at 20:39

## 2019-05-08 RX ADMIN — INSULIN LISPRO 5 UNITS: 100 INJECTION, SOLUTION INTRAVENOUS; SUBCUTANEOUS at 08:38

## 2019-05-08 RX ADMIN — AMIODARONE HYDROCHLORIDE 200 MG: 200 TABLET ORAL at 08:34

## 2019-05-08 RX ADMIN — IPRATROPIUM BROMIDE AND ALBUTEROL SULFATE 3 ML: 2.5; .5 SOLUTION RESPIRATORY (INHALATION) at 13:28

## 2019-05-08 RX ADMIN — IPRATROPIUM BROMIDE AND ALBUTEROL SULFATE 3 ML: 2.5; .5 SOLUTION RESPIRATORY (INHALATION) at 07:09

## 2019-05-08 RX ADMIN — NICOTINE 1 PATCH: 21 PATCH, EXTENDED RELEASE TRANSDERMAL at 08:34

## 2019-05-08 RX ADMIN — INSULIN DETEMIR 20 UNITS: 100 INJECTION, SOLUTION SUBCUTANEOUS at 08:39

## 2019-05-08 RX ADMIN — SODIUM CHLORIDE, PRESERVATIVE FREE 3 ML: 5 INJECTION INTRAVENOUS at 20:39

## 2019-05-08 RX ADMIN — IPRATROPIUM BROMIDE AND ALBUTEROL SULFATE 3 ML: 2.5; .5 SOLUTION RESPIRATORY (INHALATION) at 20:50

## 2019-05-08 NOTE — PLAN OF CARE
Problem: Patient Care Overview  Goal: Plan of Care Review  Outcome: Ongoing (interventions implemented as appropriate)    Goal: Individualization and Mutuality  Outcome: Ongoing (interventions implemented as appropriate)   05/08/19 7438   Individualization   Patient Specific Preferences patient ambulating today without difficulty, remains on oxygen however using incentive spriometry. plans to go home in am       Problem: Fall Risk (Adult)  Goal: Identify Related Risk Factors and Signs and Symptoms  Outcome: Ongoing (interventions implemented as appropriate)    Goal: Absence of Fall  Outcome: Ongoing (interventions implemented as appropriate)

## 2019-05-08 NOTE — PROGRESS NOTES
Continued Stay Note  [facility]     Patient Name: Francesco Arroyo  MRN: [id number]  Today's Date: 5/8/2019    Admit Date: 4/30/2019    Discharge Plan     Row Name 05/08/19 1532       Plan    Plan  Home with spouse    Patient/Family in Agreement with Plan  yes    Plan Comments  Spoke with patient at bedside.  Patient transferred from ICU last night.  Remains on 3L of 02.  Nursing attempting to wean 02 for planned discharge.  Patient states that he is planning to discharge home today or tomorrow with wife.  States that he is ambulating well and declines DME and home health at this time.  Patient will need Eliquis and Xarelto for discharge.  Patient has been enrolled in meds to bed, and will need to be provided with discount cards.  Family will transport home at discharge.      Final Discharge Disposition Code  01 - home or self-care        Discharge Codes    No documentation.       Expected Discharge Date and Time     Expected Discharge Date Expected Discharge Time    May 10, 2019             Adali Zuluaga RN

## 2019-05-08 NOTE — PROGRESS NOTES
[facility] Medicine Services  PROGRESS NOTE    Patient Name: Francesco Arroyo  : 1945  MRN: [id number]    Date of Admission: 2019  Length of Stay: 8  Primary Care Physician: Alex Velez,     Subjective   Subjective     CC:  Medical management, s/p left anterior thoracotomy, pericardial window    HPI:  Doing ok. Asking when he can go home. Remains on NC oxygen but reports breathing is improved.     Review of Systems      Otherwise ROS is negative except as mentioned in the HPI.    Objective   Objective     Vital Signs:   Temp:  [97.5 °F (36.4 °C)-98.2 °F (36.8 °C)] 98.1 °F (36.7 °C)  Heart Rate:  [67-84] 84  Resp:  [14-20] 20  BP: ()/(56-72) 119/62        Physical Exam:  GEN: NAD, resting in bed, awake  HEENT: on room air, atraumatic, normocephalic  NECK: supple, no masses  RESP: on 3 L NC, normal effort   CV: on tele, sinus rhythm  PSYCH: normal affect, appropriate  NEURO: awake, alert, no focal deficits noted  MSK: 1+  edema noted  SKIN: no rashes noted       Results Reviewed:  I have personally reviewed current lab, radiology, and data and agree.    Results from last 7 days   Lab Units 19  0228 19  1702 19  0520   WBC 10*3/mm3 12.12* 8.57 10.13   HEMOGLOBIN g/dL 11.6* 12.0* 11.8*   HEMATOCRIT % 35.5* 37.8 36.8*   PLATELETS 10*3/mm3 266 260 237     Results from last 7 days   Lab Units 19  0458 19  0228 19  0520 19  0401   SODIUM mmol/L 130* 132* 134* 138  139   POTASSIUM mmol/L 4.5 4.7 4.3 3.9  3.7   CHLORIDE mmol/L 93* 97* 94* 96*  97*   CO2 mmol/L 25.0 24.0 27.0 27.0  28.0   BUN mg/dL 16 15 15 14  14   CREATININE mg/dL 0.95 0.86 1.08 0.91  1.06   GLUCOSE mg/dL 124* 226* 184* 145*  151*   CALCIUM mg/dL 9.1 8.5* 9.2 9.1  8.7   ALT (SGPT) U/L  --   --   --  7   AST (SGOT) U/L  --   --   --  9     Estimated Creatinine Clearance: 65.1 mL/min (by C-G formula based on SCr of 0.95 mg/dL).    No results found for:  BNP    Microbiology Results Abnormal     Procedure Component Value - Date/Time    Tissue / Bone Culture - Tissue, Pericardium [[id number]] Collected:  05/06/19 0808    Lab Status:  Preliminary result Specimen:  Tissue from Pericardium Updated:  05/08/19 0924     Tissue Culture No growth at 2 days     Gram Stain Rare (1+) WBCs seen      No organisms seen    Body Fluid Culture - Body Fluid, Pericardium [[id number]] Collected:  05/06/19 0806    Lab Status:  Preliminary result Specimen:  Body Fluid from Pericardium Updated:  05/08/19 0924     Body Fluid Culture No growth at 2 days     Gram Stain Occasional WBCs seen      No organisms seen    AFB Culture - Tissue, Pericardium [[id number]] Collected:  05/06/19 0808    Lab Status:  Preliminary result Specimen:  Tissue from Pericardium Updated:  05/07/19 1131     AFB Stain No acid fast bacilli seen on concentrated smear    AFB Culture - Body Fluid, Pericardium [[id number]] Collected:  05/06/19 0806    Lab Status:  Preliminary result Specimen:  Body Fluid from Pericardium Updated:  05/07/19 1131     AFB Stain No acid fast bacilli seen on concentrated smear          Imaging Results (last 24 hours)     Procedure Component Value Units Date/Time    XR Chest 1 View [[id number]] Collected:  05/07/19 1026     Updated:  05/07/19 2210    Narrative:       EXAMINATION: XR CHEST 1 VW-05/07/2019:      INDICATION: Postop; I31.3-Pericardial effusion (noninflammatory);  R60.0-Localized edema; I48.91-Unspecified atrial fibrillation;  I50.23-Acute on chronic systolic (congestive) heart failure; I51.9-Heart  disease, unspecified; I25.119-Atherosclerotic heart disease of native  coronary artery with unspecified angina pectoris; I30.9-Acute  pericarditis, unspecified.      COMPARISON: 05/06/2019.     FINDINGS:  The heart is upper normal size. The vasculature appears  cephalized. The patient's diffuse interstitial disease appears  unchanged. No lung consolidation is seen. Left thoracotomy tube  remains  in place. There is no evidence of pneumothorax.           Impression:       Stable post left thoracotomy tube chest, with no evidence of  pneumothorax. Stable diffuse pulmonary interstitial disease.     D:  05/07/2019  E:  05/07/2019     This report was finalized on 5/7/2019 10:07 PM by DR. James Gaspar MD.             Results for orders placed during the hospital encounter of 04/30/19   Adult Transesophageal Echo (LENKA) W/ Cont if Necessary Per Protocol    Narrative · Left ventricular systolic function is severely decreased. Estimated EF   appears to be in the range of 21 - 25%.  · There is left ventricular global hypokinesis noted.  · Left atrial cavity size is mildly dilated. There is no spontaneous echo   contrast present. The left atrial appendage was visualized through   multiple planes. Left atrial appendage was found to be singularly lobar in   nature. Doppler interrogation shows normal flow within the left atrial   appendage. No evidence of a left atrial appendage thrombus was present  · There is moderate (grade 4) plaque in the aortic arch present.  · There is a moderate (1-2cm) pericardial effusion adjacent to the right   ventricle adjacent to the right atrium. There is right atrial free wall   collapse present intermittently. No right ventricular collapse.          I have reviewed the medications:    Current Facility-Administered Medications:   •  acetaminophen (TYLENOL) tablet 650 mg, 650 mg, Oral, Q6H PRN, Justine Bueno APRN  •  acetaminophen (TYLENOL) tablet 650 mg, 650 mg, Oral, Q4H PRN, Jayme Castano MD, 650 mg at 05/08/19 0102  •  amiodarone (PACERONE) tablet 200 mg, 200 mg, Oral, Q12H, Piyush Avitia MD, 200 mg at 05/08/19 0834  •  aspirin EC tablet 81 mg, 81 mg, Oral, Daily, Justine Bueno APRN, 81 mg at 05/08/19 0834  •  atorvastatin (LIPITOR) tablet 10 mg, 10 mg, Oral, Nightly, Valentina Elam APRN, 10 mg at 05/07/19 2102  •  heparin (porcine) 5000 UNIT/ML injection  5,000 Units, 5,000 Units, Subcutaneous, Q12H, Ravindra Morley PA, 5,000 Units at 05/08/19 0834  •  HYDROcodone-acetaminophen (NORCO) 7.5-325 MG per tablet 1 tablet, 1 tablet, Oral, Q6H PRN, Lino Cevallos PA, 1 tablet at 05/07/19 2102  •  insulin detemir (LEVEMIR) injection 20 Units, 20 Units, Subcutaneous, Daily, Case, Syeda V., DO, 20 Units at 05/08/19 0839  •  insulin lispro (humaLOG) injection 0-14 Units, 0-14 Units, Subcutaneous, 4x Daily With Meals & Nightly, Case, Syeda V., DO, 5 Units at 05/08/19 0838  •  ipratropium-albuterol (DUO-NEB) nebulizer solution 3 mL, 3 mL, Nebulization, Q4H PRN, Justine Bueno APRN  •  ipratropium-albuterol (DUO-NEB) nebulizer solution 3 mL, 3 mL, Nebulization, 4x Daily - RT, Kisha Salinas PA-C, 3 mL at 05/08/19 1328  •  melatonin tablet 5 mg, 5 mg, Oral, Nightly PRN, Rodrigo Palacios MD, 5 mg at 05/07/19 2101  •  metoprolol tartrate (LOPRESSOR) half tablet 12.5 mg, 12.5 mg, Oral, Q12H, Kisha Salinas PA-C, 12.5 mg at 05/08/19 0834  •  nicotine (NICODERM CQ) 21 MG/24HR patch 1 patch, 1 patch, Transdermal, Q24H, Justine Bueno APRN, 1 patch at 05/08/19 0834  •  ondansetron (ZOFRAN) tablet 4 mg, 4 mg, Oral, Q6H PRN, 4 mg at 05/08/19 0834 **OR** ondansetron (ZOFRAN) injection 4 mg, 4 mg, Intravenous, Q6H PRN, Ravindra Morley PA, 4 mg at 05/06/19 1236  •  pantoprazole (PROTONIX) EC tablet 40 mg, 40 mg, Oral, QAM, Justine Bueno APRN, 40 mg at 05/08/19 0620  •  Pharmacy Consult - MTM, , Does not apply, Daily, Brittany Schmidt RPH  •  sodium chloride 0.9 % flush 3 mL, 3 mL, Intravenous, Q12H, Justine Bueno APRN, 3 mL at 05/08/19 0833      Assessment/Plan   Assessment / Plan     Active Hospital Problems    Diagnosis POA   • **Atrial fibrillation with RVR (CMS/formerly [facility]) [I48.91] Yes   • History of esophageal cancer [Z85.01] Yes   • Type 2 diabetes mellitus (CMS/formerly [facility]) [E11.9] Yes   • COPD (chronic obstructive pulmonary disease), Severe physiology, FEV1 0.9L (34%)  [J44.9] Yes   • GERD without esophagitis [K21.9] Yes   • Elevated lactic acid level [R79.89] Yes   • Acute CHF (CMS/HCC) [I50.9] Yes   • Hyponatremia [E87.1] Yes   • Bilateral pleural effusion [J90] Yes   • Pericardial effusion (noninflammatory) [I31.3] Unknown     Added automatically from request for surgery [id number]     • Acute on chronic systolic CHF (congestive heart failure) (CMS/HCC) [I50.23] Unknown     Added automatically from request for surgery [id number]     • Left ventricular dysfunction [I51.9] Unknown     Added automatically from request for surgery [id number]     • Coronary artery disease involving native heart with angina pectoris (CMS/[facility]) [I25.119] Unknown     Added automatically from request for surgery [id number]            Brief Hospital Course to date:  Francesco Arroyo  is a 72yo M with a history of esophageal cancer s/p radiation, chemotherapy, and resection in 2008, Stage IV COPD, Systolic heart failure, CAD, and a pericardial effusion who is admitted to the ICU after a pericardial window on 5/6 by Dr. Pearson.     Plan  -- CTX out per CTS this AM, f/u pathology and cultures  -- continue basal/bolus insulin, improvement noted  -- NRT inpatient for smoking cessation  -- wean O2 as able, may need home oxygen at discharge  -- repeat ECHO pending, may need lifevest at d/c depending on EF   -- continue Bblocker, statin, ASA for ischemic COM- cardiology following  -- on amio for tachy-arrythmia   -- possible home tomorrow if ok with CTS/cardiology        DVT Prophylaxis:  heparin    Disposition: I expect the patient to be discharged TBD per primary team, possibly tomorrow  CODE STATUS:   Code Status and Medical Interventions:   Ordered at: 04/30/19 2041     Code Status:    CPR     Medical Interventions (Level of Support Prior to Arrest):    Full         Electronically signed by Roberta Mohan MD, 05/08/19, 2:29 PM.

## 2019-05-08 NOTE — PROGRESS NOTES
CTS Progress Note      POD 2 s/p  left anterior thoracotomy, pericardial window.       Subjective  Sitting up in chair.  Pleasant.  Conversant.  No complaint.  Wants discharge home      Objective    Physical Exam:   Vital Signs   Temp:  [97.5 °F (36.4 °C)-98.2 °F (36.8 °C)] 98.2 °F (36.8 °C)  Heart Rate:  [66-79] 70  Resp:  [14-20] 20  BP: ()/(55-72) 107/66  Arterial Line BP: (115)/(57) 115/57   GEN: NAD   CV: Regular rate and rhythm no murmur rub or gallop    RESP: Scattered rhonchi right base cleared mostly with cough   EXT: Warm to the touch 2+ pitting edema below the knee   Incision: Healing well clean dry and intact     Results     Results from last 7 days   Lab Units 05/07/19  0228   WBC 10*3/mm3 12.12*   HEMOGLOBIN g/dL 11.6*   HEMATOCRIT % 35.5*   PLATELETS 10*3/mm3 266     Results from last 7 days   Lab Units 05/08/19  0458   SODIUM mmol/L 130*   POTASSIUM mmol/L 4.5   CHLORIDE mmol/L 93*   CO2 mmol/L 25.0   BUN mg/dL 16   CREATININE mg/dL 0.95   GLUCOSE mg/dL 124*   CALCIUM mg/dL 9.1       Pericardial tissue pathology:  Final Diagnosis   PERICARDIUM:  Dense fibrosis with chronic inflammation and focal fibrin.   Negative for neoplasm.           Assessment/Plan   Postop day 2 status post left anterior thoracotomy with pericardial window    Atrial fibrillation with RVR (CMS/HCC)    History of esophageal cancer    Type 2 diabetes mellitus (CMS/HCC)    COPD (chronic obstructive pulmonary disease), Severe physiology, FEV1 0.9L (34%)    GERD without esophagitis    Elevated lactic acid level    Acute CHF (CMS/HCC)    Hyponatremia    Bilateral pleural effusion    Pericardial effusion (noninflammatory)    Acute on chronic systolic CHF (congestive heart failure) (CMS/HCC)    Left ventricular dysfunction    Coronary artery disease involving native heart with angina pectoris (CMS/HCC)        Plan   Pericardial fluid cytology and cultures pending  Continue postoperative care  RED Lindsay  05/08/19  7:26  AM     Stable postoperative course.  Patient denies shortness of breath.  Vital signs are stable hemodynamics are normal.  Probably ready for discharge in a.m. I have reviewed, verified, and confirmed the above history and current status.  I have examined the patient and confirmed the above physical findings.Above plan and treatment regimen discussed in detail with patient.  Options of treatment, attendant risks vs benefits, and my recommendations were discussed and all questions answered.    Zaid Pearson MD  CTSurgery  05/08/19   1:12 PM

## 2019-05-08 NOTE — PLAN OF CARE
Problem: Patient Care Overview  Goal: Plan of Care Review  Outcome: Ongoing (interventions implemented as appropriate)      Problem: Patient Care Overview  Goal: Plan of Care Review  Outcome: Ongoing (interventions implemented as appropriate)   05/08/19 0433   Coping/Psychosocial   Plan of Care Reviewed With patient   OTHER   Outcome Summary Pt transferred from ICU. 3L NC. Rested intermittently throughout the night. VSS. Prn lortab for pain. Hopeful for d/c home. Will continue to monitor.

## 2019-05-09 VITALS
DIASTOLIC BLOOD PRESSURE: 69 MMHG | BODY MASS INDEX: 24.11 KG/M2 | OXYGEN SATURATION: 93 % | WEIGHT: 150 LBS | HEART RATE: 76 BPM | SYSTOLIC BLOOD PRESSURE: 121 MMHG | RESPIRATION RATE: 16 BRPM | HEIGHT: 66 IN | TEMPERATURE: 97.4 F

## 2019-05-09 LAB
ABO + RH BLD: NORMAL
ABO + RH BLD: NORMAL
ANION GAP SERPL CALCULATED.3IONS-SCNC: 8 MMOL/L
BACTERIA FLD CULT: NORMAL
BACTERIA SPEC AEROBE CULT: NORMAL
BASOPHILS # BLD AUTO: 0.02 10*3/MM3 (ref 0–0.2)
BASOPHILS NFR BLD AUTO: 0.2 % (ref 0–1.5)
BH BB BLOOD EXPIRATION DATE: NORMAL
BH BB BLOOD EXPIRATION DATE: NORMAL
BH BB BLOOD TYPE BARCODE: 5100
BH BB BLOOD TYPE BARCODE: 5100
BH BB DISPENSE STATUS: NORMAL
BH BB DISPENSE STATUS: NORMAL
BH BB PRODUCT CODE: NORMAL
BH BB PRODUCT CODE: NORMAL
BH BB UNIT NUMBER: NORMAL
BH BB UNIT NUMBER: NORMAL
BH CV ECHO MEAS - AO ROOT AREA (BSA CORRECTED): 1.8
BH CV ECHO MEAS - AO ROOT AREA: 8 CM^2
BH CV ECHO MEAS - AO ROOT DIAM: 3.2 CM
BH CV ECHO MEAS - BSA(HAYCOCK): 1.8 M^2
BH CV ECHO MEAS - BSA: 1.8 M^2
BH CV ECHO MEAS - BZI_BMI: 23.9 KILOGRAMS/M^2
BH CV ECHO MEAS - BZI_METRIC_HEIGHT: 167.6 CM
BH CV ECHO MEAS - BZI_METRIC_WEIGHT: 67.1 KG
BH CV ECHO MEAS - EDV(CUBED): 304.5 ML
BH CV ECHO MEAS - EDV(MOD-SP2): 146 ML
BH CV ECHO MEAS - EDV(MOD-SP4): 145 ML
BH CV ECHO MEAS - EDV(TEICH): 233.5 ML
BH CV ECHO MEAS - EF(CUBED): 42.3 %
BH CV ECHO MEAS - EF(MOD-BP): 40 %
BH CV ECHO MEAS - EF(MOD-SP2): 41.1 %
BH CV ECHO MEAS - EF(MOD-SP4): 42.8 %
BH CV ECHO MEAS - EF(TEICH): 34.2 %
BH CV ECHO MEAS - ESV(CUBED): 175.7 ML
BH CV ECHO MEAS - ESV(MOD-SP2): 86 ML
BH CV ECHO MEAS - ESV(MOD-SP4): 83 ML
BH CV ECHO MEAS - ESV(TEICH): 153.7 ML
BH CV ECHO MEAS - FS: 16.8 %
BH CV ECHO MEAS - IVS/LVPW: 0.96
BH CV ECHO MEAS - IVSD: 0.72 CM
BH CV ECHO MEAS - LV DIASTOLIC VOL/BSA (35-75): 82.4 ML/M^2
BH CV ECHO MEAS - LV MASS(C)D: 204.9 GRAMS
BH CV ECHO MEAS - LV MASS(C)DI: 116.4 GRAMS/M^2
BH CV ECHO MEAS - LV SYSTOLIC VOL/BSA (12-30): 47.2 ML/M^2
BH CV ECHO MEAS - LVIDD: 6.7 CM
BH CV ECHO MEAS - LVIDS: 5.6 CM
BH CV ECHO MEAS - LVLD AP2: 8 CM
BH CV ECHO MEAS - LVLD AP4: 8.1 CM
BH CV ECHO MEAS - LVLS AP2: 7 CM
BH CV ECHO MEAS - LVLS AP4: 7.6 CM
BH CV ECHO MEAS - LVOT AREA (M): 3.1 CM^2
BH CV ECHO MEAS - LVOT AREA: 3 CM^2
BH CV ECHO MEAS - LVOT DIAM: 2 CM
BH CV ECHO MEAS - LVPWD: 0.75 CM
BH CV ECHO MEAS - SI(CUBED): 73.2 ML/M^2
BH CV ECHO MEAS - SI(MOD-SP2): 34.1 ML/M^2
BH CV ECHO MEAS - SI(MOD-SP4): 35.2 ML/M^2
BH CV ECHO MEAS - SI(TEICH): 45.4 ML/M^2
BH CV ECHO MEAS - SV(CUBED): 128.9 ML
BH CV ECHO MEAS - SV(MOD-SP2): 60 ML
BH CV ECHO MEAS - SV(MOD-SP4): 62 ML
BH CV ECHO MEAS - SV(TEICH): 79.8 ML
BH CV VAS BP LEFT ARM: NORMAL MMHG
BUN BLD-MCNC: 12 MG/DL (ref 8–23)
BUN/CREAT SERPL: 11.2 (ref 7–25)
CALCIUM SPEC-SCNC: 8.9 MG/DL (ref 8.6–10.5)
CHLORIDE SERPL-SCNC: 98 MMOL/L (ref 98–107)
CO2 SERPL-SCNC: 26 MMOL/L (ref 22–29)
CREAT BLD-MCNC: 1.07 MG/DL (ref 0.76–1.27)
DEPRECATED RDW RBC AUTO: 50.9 FL (ref 37–54)
DEPRECATED RDW RBC AUTO: 52.5 FL (ref 37–54)
EOSINOPHIL # BLD AUTO: 0.05 10*3/MM3 (ref 0–0.4)
EOSINOPHIL NFR BLD AUTO: 0.5 % (ref 0.3–6.2)
ERYTHROCYTE [DISTWIDTH] IN BLOOD BY AUTOMATED COUNT: 16.8 % (ref 12.3–15.4)
ERYTHROCYTE [DISTWIDTH] IN BLOOD BY AUTOMATED COUNT: 17 % (ref 12.3–15.4)
GFR SERPL CREATININE-BSD FRML MDRD: 68 ML/MIN/1.73
GIE STN SPEC: NORMAL
GIE STN SPEC: NORMAL
GLUCOSE BLD-MCNC: 213 MG/DL (ref 65–99)
GLUCOSE BLDC GLUCOMTR-MCNC: 136 MG/DL (ref 70–130)
GLUCOSE BLDC GLUCOMTR-MCNC: 140 MG/DL (ref 70–130)
GLUCOSE BLDC GLUCOMTR-MCNC: 166 MG/DL (ref 70–130)
GRAM STN SPEC: NORMAL
HCT VFR BLD AUTO: 38.5 % (ref 37.5–51)
HCT VFR BLD AUTO: 40.7 % (ref 37.5–51)
HGB BLD-MCNC: 12.3 G/DL (ref 13–17.7)
HGB BLD-MCNC: 12.6 G/DL (ref 13–17.7)
IMM GRANULOCYTES # BLD AUTO: 0.09 10*3/MM3 (ref 0–0.05)
IMM GRANULOCYTES NFR BLD AUTO: 0.9 % (ref 0–0.5)
LV EF 2D ECHO EST: 30 %
LYMPHOCYTES # BLD AUTO: 0.92 10*3/MM3 (ref 0.7–3.1)
LYMPHOCYTES NFR BLD AUTO: 9.5 % (ref 19.6–45.3)
MCH RBC QN AUTO: 26.5 PG (ref 26.6–33)
MCH RBC QN AUTO: 26.6 PG (ref 26.6–33)
MCHC RBC AUTO-ENTMCNC: 31 G/DL (ref 31.5–35.7)
MCHC RBC AUTO-ENTMCNC: 31.9 G/DL (ref 31.5–35.7)
MCV RBC AUTO: 83.3 FL (ref 79–97)
MCV RBC AUTO: 85.7 FL (ref 79–97)
MONOCYTES # BLD AUTO: 0.79 10*3/MM3 (ref 0.1–0.9)
MONOCYTES NFR BLD AUTO: 8.1 % (ref 5–12)
NEUTROPHILS # BLD AUTO: 7.83 10*3/MM3 (ref 1.7–7)
NEUTROPHILS NFR BLD AUTO: 80.8 % (ref 42.7–76)
PLATELET # BLD AUTO: 317 10*3/MM3 (ref 140–450)
PLATELET # BLD AUTO: 333 10*3/MM3 (ref 140–450)
PMV BLD AUTO: 8.8 FL (ref 6–12)
PMV BLD AUTO: 9.2 FL (ref 6–12)
POTASSIUM BLD-SCNC: 5.5 MMOL/L (ref 3.5–5.2)
RBC # BLD AUTO: 4.62 10*6/MM3 (ref 4.14–5.8)
RBC # BLD AUTO: 4.75 10*6/MM3 (ref 4.14–5.8)
SODIUM BLD-SCNC: 132 MMOL/L (ref 136–145)
UNIT  ABO: NORMAL
UNIT  ABO: NORMAL
UNIT  RH: NORMAL
UNIT  RH: NORMAL
WBC NRBC COR # BLD: 10.06 10*3/MM3 (ref 3.4–10.8)
WBC NRBC COR # BLD: 9.7 10*3/MM3 (ref 3.4–10.8)

## 2019-05-09 PROCEDURE — 85027 COMPLETE CBC AUTOMATED: CPT | Performed by: PHYSICIAN ASSISTANT

## 2019-05-09 PROCEDURE — 99239 HOSP IP/OBS DSCHRG MGMT >30: CPT | Performed by: INTERNAL MEDICINE

## 2019-05-09 PROCEDURE — 82962 GLUCOSE BLOOD TEST: CPT

## 2019-05-09 PROCEDURE — 94799 UNLISTED PULMONARY SVC/PX: CPT

## 2019-05-09 PROCEDURE — 85025 COMPLETE CBC W/AUTO DIFF WBC: CPT | Performed by: PHYSICIAN ASSISTANT

## 2019-05-09 PROCEDURE — 80048 BASIC METABOLIC PNL TOTAL CA: CPT | Performed by: PHYSICIAN ASSISTANT

## 2019-05-09 PROCEDURE — 97161 PT EVAL LOW COMPLEX 20 MIN: CPT

## 2019-05-09 PROCEDURE — 97110 THERAPEUTIC EXERCISES: CPT

## 2019-05-09 PROCEDURE — 97166 OT EVAL MOD COMPLEX 45 MIN: CPT

## 2019-05-09 PROCEDURE — 97530 THERAPEUTIC ACTIVITIES: CPT

## 2019-05-09 PROCEDURE — 99024 POSTOP FOLLOW-UP VISIT: CPT | Performed by: THORACIC SURGERY (CARDIOTHORACIC VASCULAR SURGERY)

## 2019-05-09 PROCEDURE — 25010000002 HEPARIN (PORCINE) PER 1000 UNITS: Performed by: PHYSICIAN ASSISTANT

## 2019-05-09 PROCEDURE — 99232 SBSQ HOSP IP/OBS MODERATE 35: CPT | Performed by: INTERNAL MEDICINE

## 2019-05-09 RX ORDER — NICOTINE 21 MG/24HR
1 PATCH, TRANSDERMAL 24 HOURS TRANSDERMAL
Qty: 28 EACH | Refills: 2 | Status: SHIPPED | OUTPATIENT
Start: 2019-05-09 | End: 2019-05-10

## 2019-05-09 RX ORDER — FUROSEMIDE 20 MG/1
20 TABLET ORAL DAILY
Status: DISCONTINUED | OUTPATIENT
Start: 2019-05-09 | End: 2019-05-09 | Stop reason: HOSPADM

## 2019-05-09 RX ORDER — ATORVASTATIN CALCIUM 10 MG/1
10 TABLET, FILM COATED ORAL NIGHTLY
Qty: 30 TABLET | Refills: 3 | Status: SHIPPED | OUTPATIENT
Start: 2019-05-09 | End: 2019-05-10

## 2019-05-09 RX ORDER — AMIODARONE HYDROCHLORIDE 200 MG/1
200 TABLET ORAL EVERY 12 HOURS SCHEDULED
Qty: 30 TABLET | Refills: 0 | Status: SHIPPED | OUTPATIENT
Start: 2019-05-09 | End: 2019-05-17 | Stop reason: SDUPTHER

## 2019-05-09 RX ADMIN — PANTOPRAZOLE SODIUM 40 MG: 40 TABLET, DELAYED RELEASE ORAL at 06:44

## 2019-05-09 RX ADMIN — IPRATROPIUM BROMIDE AND ALBUTEROL SULFATE 3 ML: 2.5; .5 SOLUTION RESPIRATORY (INHALATION) at 07:32

## 2019-05-09 RX ADMIN — INSULIN LISPRO 3 UNITS: 100 INJECTION, SOLUTION INTRAVENOUS; SUBCUTANEOUS at 08:27

## 2019-05-09 RX ADMIN — AMIODARONE HYDROCHLORIDE 200 MG: 200 TABLET ORAL at 08:26

## 2019-05-09 RX ADMIN — IPRATROPIUM BROMIDE AND ALBUTEROL SULFATE 3 ML: 2.5; .5 SOLUTION RESPIRATORY (INHALATION) at 12:18

## 2019-05-09 RX ADMIN — NICOTINE 1 PATCH: 21 PATCH, EXTENDED RELEASE TRANSDERMAL at 08:27

## 2019-05-09 RX ADMIN — SODIUM CHLORIDE, PRESERVATIVE FREE 10 ML: 5 INJECTION INTRAVENOUS at 08:27

## 2019-05-09 RX ADMIN — FUROSEMIDE 20 MG: 20 TABLET ORAL at 12:20

## 2019-05-09 RX ADMIN — HEPARIN SODIUM 5000 UNITS: 5000 INJECTION INTRAVENOUS; SUBCUTANEOUS at 08:26

## 2019-05-09 RX ADMIN — HYDROCODONE BITARTRATE AND ACETAMINOPHEN 1 TABLET: 7.5; 325 TABLET ORAL at 03:57

## 2019-05-09 RX ADMIN — METOPROLOL TARTRATE 12.5 MG: 25 TABLET, FILM COATED ORAL at 08:26

## 2019-05-09 RX ADMIN — ASPIRIN 81 MG: 81 TABLET, COATED ORAL at 08:26

## 2019-05-09 NOTE — DISCHARGE PLACEMENT REQUEST
"Francesco Arroyo (73 y.o. Male)     - Elda Anderson [phone]        Date of Birth Social Security Number Address Home Phone MRN    1945  [address] [address] [phone] [id number]    [employer] Marital Status          [employer]        Admission Date Admission Type Admitting Provider Attending Provider Department, Room/Bed    19 Emergency Roberta Mohan MD Hunter, Sarah M, MD [address], [address]/1    Discharge Date Discharge Disposition Discharge Destination         Home or Self Care              Attending Provider:  Roberta Mohan MD    Allergies:  No Known Allergies    Isolation:  None   Infection:  None   Code Status:  CPR    Ht:  167.6 cm (66\")   Wt:  68 kg (150 lb)    Admission Cmt:  None   Principal Problem:  Atrial fibrillation with RVR (CMS/HCC) [I48.91]                 Active Insurance as of 2019     Primary Coverage     Payor Plan Insurance Group Employer/Plan Group    HUMANA MEDICARE REPLACEMENT HUMANA MEDICARE REPL [id number]     Payor Plan Address Payor Plan Phone Number Payor Plan Fax Number Effective Dates    [address] [address] [phone]  2018 - None Entered    [facility] [address]       Subscriber Name Subscriber Birth Date Member ID       FRANCESCO ARROYO 1945 [id number]                 Emergency Contacts      (Rel.) Home Phone Work Phone Mobile Phone    Kasandra Arroyo (Spouse) [phone] -- --          [address] [address]  Dept. Phone:  [phone]  Dept. Fax:   Date Ordered: May 9, 2019         Patient:  Francesco Arroyo MRN:  [id number]   [address]   [facility] [address] :  1945  SSN:    Phone: [phone] Sex:  M     Weight: 68 kg (150 lb)         Ht Readings from Last 1 Encounters:   19 167.6 cm (66\")         Oxygen Therapy         (Order ID: [id number])    Diagnosis:  Pericardial effusion " (noninflammatory) (I31.3 [ICD-10-CM] 423.9 [ICD-9-CM])  Atrial fibrillation with RVR (CMS/HCC) (I48.91 [ICD-10-CM] 427.31 [ICD-9-CM])  Chronic obstructive pulmonary disease, unspecified COPD type (CMS/HCC) (J44.9 [ICD-10-CM] 496 [ICD-9-CM])   Quantity:  1     Delivery Modality: Nasal Cannula  Liters Per Minute: 2  Duration: Continuous  Equipment:  Oxygen Concentrator &  &  Portable Gaseous Oxygen System & Portable Oxygen Contents Gaseous &  Conserving Regulator  Length of Need (99 Months = Lifetime): 6 Months        Authorizing Provider's Phone: [phone]   Verbal Order Mode: Telephone with readback   Authorizing Provider: Roberta Mohan MD  Authorizing Provider's NPI: [id number]     Order Entered By: Elda Anderson 2019 12:21 PM     Electronically signed by:  MADIHA Mohan/ Elda Anderson RN Case Manager          Insurance Information                HUMANA MEDICARE REPLACEMENT/HUMANA MEDICARE REPL Phone: [phone]    Subscriber: Francesco Arroyo Subscriber#: [id number]    Group#: [id number] Precert#:              History & Physical      Elda Chester MD at 2019  7:56 PM              [facility] Medicine Services  HISTORY AND PHYSICAL    Patient Name: Francesco Arroyo  : 1945  MRN: [id number]  Primary Care Physician: Alex Velez DO  Date of admission: 2019      Subjective   Subjective     Chief Complaint:  Lower extremity swelling     HPI:  Francesco Arroyo is a 73 y.o. male with PMH significant for esophageal cancer, DM2, HLD, CAD, COPD, and GERD that presents to the ED with complaint of fatigue and bilateral lower extremity edema.  He states that he has had intermittent lower extremity edema since January.  But over the last 10 days he has had increased bilateral lower extremity edema that has not resolved.  He states that he was evaluated by PCP and was given oral Lasix which she is taken for the past 2 days without any  relief.  He denies any SOA, chest pain, or fever.  He does admit to being more fatigued than usual.  Upon arrival to the ED he is found to have concern of A. fib with RVR.  He is started on Cardizem and heparin drip.  Additionally he is given 20 mg IV Lasix with good urinary output.  He will be admitted to hospital medicine for further evaluation.    Review of Systems   Constitutional: Positive for activity change and fatigue. Negative for appetite change, chills, diaphoresis and fever.   HENT: Negative.    Eyes: Negative for visual disturbance.   Respiratory: Positive for cough and wheezing. Negative for chest tightness and shortness of breath.    Cardiovascular: Positive for leg swelling. Negative for chest pain and palpitations.   Gastrointestinal: Negative for abdominal distention, abdominal pain, constipation, diarrhea, nausea and vomiting.   Genitourinary: Negative for difficulty urinating, dysuria, frequency and urgency.   Musculoskeletal: Negative for arthralgias and myalgias.   Skin: Negative for color change, pallor and rash.   Neurological: Negative for dizziness, syncope, weakness, light-headedness and headaches.   Psychiatric/Behavioral: Negative for confusion. The patient is not nervous/anxious.        Otherwise complete ROS reviewed and is negative except as mentioned in the HPI.    Personal History     Past Medical History:   Diagnosis Date   • Esophageal cancer (CMS/HCC) 11/28/2016       Past Surgical History:   Procedure Laterality Date   • ESOPHAGUS SURGERY     • OTHER SURGICAL HISTORY  03/31/2008    Resection of esophageal cancer       Family History: family history is not on file. Otherwise pertinent FHx was reviewed and unremarkable.     Social History:  reports that he has been smoking cigarettes.  He has smoked for the past 0.50 years. He has never used smokeless tobacco. He reports that he drinks alcohol. He reports that he does not use drugs.  Social History     Social History Narrative   •  Not on file       Medications:    Available home medication information reviewed.  Medications Prior to Admission   Medication Sig Dispense Refill Last Dose   • aspirin 81 MG EC tablet Take 81 mg by mouth Daily.      • doxycycline (VIBRAMYCIN) 100 MG capsule Take 100 mg by mouth 2 (Two) Times a Day.      • furosemide (LASIX) 20 MG tablet Take 20 mg by mouth Daily.      • glyBURIDE-metFORMIN (GLUCOVANCE) 5-500 MG per tablet Take 1 tablet by mouth 2 (Two) Times a Day With Meals.      • HYDROcodone-acetaminophen (NORCO) 5-325 MG per tablet Take 1 tablet by mouth Every 8 (Eight) Hours As Needed.      • albuterol (PROVENTIL) (2.5 MG/3ML) 0.083% nebulizer solution USE CONTENTS OF 1 VIAL VIA NEBULIZER EVERY 6 HOURS  0 Taking   • esomeprazole (nexIUM) 40 MG capsule TAKE ONE CAPSULE BY MOUTH EVERY DAY  3 Taking       No Known Allergies    Objective   Objective     Vital Signs:   Temp:  [97.9 °F (36.6 °C)] 97.9 °F (36.6 °C)  Heart Rate:  [116-150] 119  Resp:  [20-22] 22  BP: ()/() 92/62        Physical Exam   Constitutional: He is oriented to person, place, and time. He appears well-developed and well-nourished. No distress.   HENT:   Head: Normocephalic and atraumatic.   Eyes: Pupils are equal, round, and reactive to light.   Neck: Normal range of motion. Neck supple. No JVD present.   Cardiovascular: Normal heart sounds and intact distal pulses. An irregularly irregular rhythm present. Tachycardia present. Exam reveals no gallop and no friction rub.   No murmur heard.  Pulmonary/Chest: Effort normal. He has wheezes. He has rales in the right lower field and the left lower field.   Coarse and wheeze lung sounds throughout    Abdominal: Soft. Bowel sounds are normal. He exhibits no distension and no mass. There is no tenderness. There is no guarding.   Musculoskeletal: Normal range of motion. He exhibits edema. He exhibits no tenderness.   +3-4 pitting edema BLE    Neurological: He is alert and oriented to person,  place, and time.   Skin: Skin is warm and dry. No rash noted. No erythema.   Psychiatric: He has a normal mood and affect. His behavior is normal. Thought content normal.   Vitals reviewed.       Results Reviewed:  I have personally reviewed current lab, radiology, and data and agree.    Results from last 7 days   Lab Units 04/30/19 1958 04/30/19  1528   WBC 10*3/mm3 9.86 10.71   HEMOGLOBIN g/dL 12.2* 13.3   HEMATOCRIT % 36.8* 39.8   PLATELETS 10*3/mm3 324 312   INR   --  1.17*     Results from last 7 days   Lab Units 04/30/19  1528   SODIUM mmol/L 133*   POTASSIUM mmol/L 3.9   CHLORIDE mmol/L 89*   CO2 mmol/L 27.0   BUN mg/dL 13   CREATININE mg/dL 0.85   GLUCOSE mg/dL 137*   CALCIUM mg/dL 9.3   ALT (SGPT) U/L 12   AST (SGOT) U/L 16     Estimated Creatinine Clearance: 73.3 mL/min (by C-G formula based on SCr of 0.85 mg/dL).  Brief Urine Lab Results  (Last result in the past 365 days)      Color   Clarity   Blood   Leuk Est   Nitrite   Protein   CREAT   Urine HCG        04/30/19 1744 Yellow Clear Negative Negative Negative Negative             No results found for: BNP  Imaging Results (last 24 hours)     Procedure Component Value Units Date/Time    XR Chest 1 View [[id number]] Collected:  04/30/19 1635     Updated:  04/30/19 1859    Narrative:       EXAMINATION: XR CHEST 1 VW-04/30/2019:      INDICATION: SOA, triage protocol.      COMPARISON: NONE.     FINDINGS: Cardiac size borderline enlarged. Chronic lung changes without  focal opacification or consolidation. Probable trace bilateral pleural  effusions with mild interstitial prominence. No pneumothorax.           Impression:       Borderline cardiomegaly with trace bilateral pleural  effusions and chronic lung changes.     D:  04/30/2019  E:  04/30/2019        This report was finalized on 4/30/2019 6:56 PM by Dr. Yared Lemus.                Assessment/Plan   Assessment / Plan     Active Hospital Problems    Diagnosis POA   • **Atrial fibrillation with RVR  (CMS/[facility]) [I48.91] Yes   • History of esophageal cancer [Z85.01] Yes   • Type 2 diabetes mellitus (CMS/[facility]) [E11.9] Yes   • COPD (chronic obstructive pulmonary disease) (CMS/[facility]) [J44.9] Yes   • GERD without esophagitis [K21.9] Yes   • Elevated lactic acid level [R79.89] Yes   • Acute CHF (CMS/[facility]) [I50.9] Yes   • Hyponatremia [E87.1] Yes   • Bilateral pleural effusion [J90] Yes     73-year-old male presenting to the ED with complaint of bilateral lower extremity edema who was found to have concern of new onset atrial fibrillation RVR as well as concern of acute CHF and hyponatremia.    Atrial fib with RVR  - Cardizem drip started in the ED will continue for rate control  - Cardiology consult in a.m.  -Heparin drip started in the ED  - Troponin in the a.m.  - EKG in a.m.  -Echo in a.m.    Acute CHF  - Echo in the a.m.  -IV Lasix given in the ED will continue for additional dose times 1 in the morning  -Cardiology consult in the a.m.    Hyponatremia  -Likely related to volume overload  -Serum and urine osmolality, urine electrolytes  -BMP in am       Lactic acidosis  -Reflex lactic acid pending  -No leukocytosis no fever  -CBC in am     Pleural effusion  - Likely secondary to above  -Denies SOA  - Continue to monitor    Diabetes mellitus 2  -FSBG before meals and at bedtime  -SS insulin  -Hemoglobin A1c in the a.m.    COPD  - PRN DuoNeb  -Recent diagnosis of bronchitis, will continue doxycycline that he was on outpatient, has 3 doses left  -CBC, BMP in a.m.    History of esophageal cancer with GERD  -Continue Protonix daily    CAD  - Continue daily aspirin    Hyperlipidemia  -Notes intolerance of statin previously  - Lipid panel in a.m.    Tobacco abuse  -Nicotine patch  - 60-year 1 PPD tobacco use history  Tobacco Cessation Counselin minutes of tobacco cessation counseling provided, including but not limited to, risks of ongoing tobacco use, pertinence to current or future health status and  availability/examples of cessation resources.  Patient expresses desire to quit.      DVT prophylaxis: On heparin drip    CODE STATUS:    Code Status and Medical Interventions:   Ordered at: 04/30/19 2041     Code Status:    CPR     Medical Interventions (Level of Support Prior to Arrest):    Full       Admission Status:  I believe this patient meets INPATIENT status due to the need for care which can only be reasonably provided in an hospital setting such as possible need for aggressive/expedited ancillary services and/or consultation services, IV medications, close physician monitoring, and/or procedures.  In such, I feel patient’s risk for adverse outcomes and need for care warrant INPATIENT evaluation and predict the patient’s care encounter to likely last beyond 2 midnights.        Electronically signed by NIMCO Treviño, 04/30/19, 7:56 PM.      Brief Attending Admission Attestation     I have seen and examined the patient, performing an independent face-to-face diagnostic evaluation with plan of care reviewed and developed with the advanced practice clinician (APC).      Brief Summary Statement:   Francesco Arroyo is a 73 y.o. male with past medical history significant for esophageal cancer, type 2 diabetes, hyperlipidemia, coronary artery disease, COPD, and GERD.  The patient presented to [facility] emergency department today with a worsening history of fatigue and lower extremity edema.  This is happened intermittently for the last several months but particularly has been worse for the last 10 days or so.  His primary care physician prescribed Lasix 20 mg p.o. for the last 2 or 3 days and that has not been effective.    Here in the emergency department, he was noted to be in rapid A. fib with rates from 120-150.  Initial blood pressures were in the 140s to 150s.  TSH was 1.380.  Lactic elevated at 3.1 with repeat lactic at 1.6.  proBNP is 1689.  Troponin was negative.Lasix IV was  administered with profuse UOP.    Remainder of detailed HPI is as noted above and has been reviewed and/or edited by me for completeness.      Attending Physical Exam:  Constitutional: Awake, alert  Eyes: PERRLA, sclerae anicteric, no conjunctival injection  HENT: NCAT, mucous membranes moist  Neck: Supple, no thyromegaly, no lymphadenopathy, trachea midline  Respiratory: Scattered rhonchi and wheezes, nonlabored respirations   Cardiovascular: Tachy, irreg,  palpable pedal pulses bilaterally  Gastrointestinal: Positive bowel sounds, soft, nontender, nondistended  Musculoskeletal: 1+ bilateral ankle edema, no clubbing or cyanosis to extremities  Psychiatric: Appropriate affect, cooperative  Neurologic: Oriented x 3, strength symmetric in all extremities, Cranial Nerves grossly intact to confrontation, speech clear  Skin: No rashes        Brief Assessment/Plan :  See above for further detailed assessment and plan developed with APC which I have reviewed and/or edited for completeness.      Electronically signed by Elda Chester MD, 04/30/19, 11:59 PM.             Electronically signed by Elda Chester MD at 5/1/2019 12:03 AM     Value Information      80 % Abnormal, room air 5/9/19

## 2019-05-09 NOTE — PROGRESS NOTES
Case Management Discharge Note    Final Note: Pt to be discharged home with wife. Pt will have home oxygen provided by [facility]. Pt to be fitted for Life Vest for home.     Destination      No service has been selected for the patient.      Durable Medical Equipment - Selection Complete      Service Provider Request Status Selected Services Address Phone Number Fax Number    [facility] Selected Durable Medical Equipment [address] [facility] [address] [phone]      Dialysis/Infusion      No service has been selected for the patient.      Home Medical Care      No service has been selected for the patient.      Therapy      No service has been selected for the patient.      Community Resources      No service has been selected for the patient.             Final Discharge Disposition Code: 01 - home or self-care

## 2019-05-09 NOTE — PROGRESS NOTES
Continued Stay Note  [facility]     Patient Name: Francesco Arroyo  MRN: [id number]  Today's Date: 5/9/2019    Admit Date: 4/30/2019    Discharge Plan     Row Name 05/09/19 1255       Plan    Patient/Family in Agreement with Plan  yes    Plan Comments  CM consulted for home oxygen. CM made referral to [facility] and faxed orders to [phone] per request. [facility] will deliver portable oxygen tank to bedside and arrange for home conservoir to be delivered. Pt awaiting to be fitted for life vest prior to discharge.  Pt to discharge home with wife.         Discharge Codes    No documentation.       Expected Discharge Date and Time     Expected Discharge Date Expected Discharge Time    May 9, 2019             Elda Anderson

## 2019-05-09 NOTE — THERAPY DISCHARGE NOTE
Acute Care - Physical Therapy Initial Eval/Discharge  [facility]     Patient Name: Francesco Arroyo  : 1945  MRN: [id number]  Today's Date: 2019   Onset of Illness/Injury or Date of Surgery: 19  Date of Referral to PT: 19  Referring Physician: MD Avlino      Admit Date: 2019    Visit Dx:    ICD-10-CM ICD-9-CM   1. Pericardial effusion (noninflammatory) I31.3 423.9   2. Pedal edema R60.0 782.3   3. Atrial fibrillation with rapid ventricular response (CMS/HCC) I48.91 427.31   4. Acute on chronic systolic CHF (congestive heart failure) (CMS/HCC) I50.23 428.23     428.0   5. Left ventricular dysfunction I51.9 429.9   6. Coronary artery disease involving native heart with angina pectoris, unspecified vessel or lesion type (CMS/HCC) I25.119 414.01     413.9   7. Acute pericardial effusion I30.9 420.90   8. Atrial fibrillation with RVR (CMS/HCC) I48.91 427.31   9. Chronic obstructive pulmonary disease, unspecified COPD type (CMS/[facility]) J44.9 496   10. Impaired functional mobility, balance, gait, and endurance Z74.09 V49.89     Patient Active Problem List   Diagnosis   • Esophageal cancer (CMS/HCC)   • Atrial fibrillation with RVR (CMS/HCC)   • History of esophageal cancer   • Type 2 diabetes mellitus (CMS/HCC)   • COPD (chronic obstructive pulmonary disease), Severe physiology, FEV1 0.9L (34%)   • GERD without esophagitis   • Elevated lactic acid level   • Acute CHF (CMS/HCC)   • Hyponatremia   • Bilateral pleural effusion   • Pericardial effusion (noninflammatory)   • Acute on chronic systolic CHF (congestive heart failure) (CMS/HCC)   • Left ventricular dysfunction   • Coronary artery disease involving native heart with angina pectoris (CMS/HCC)     Past Medical History:   Diagnosis Date   • Esophageal cancer (CMS/HCC) 2016     Past Surgical History:   Procedure Laterality Date   • CARDIAC CATHETERIZATION N/A 2019    Procedure: Left Heart Cath;  Surgeon: Jayme Castano MD;   "Location: [facility] CATH INVASIVE LOCATION;  Service: Cardiovascular   • ESOPHAGUS SURGERY     • OTHER SURGICAL HISTORY  03/31/2008    Resection of esophageal cancer   • PERICARDIAL WINDOW N/A 5/6/2019    Procedure: PERICARDIAL WINDOW, THORACOTOMY APPROACH LEFT;  Surgeon: Zaid Pearson MD;  Location: [facility] OR;  Service: Cardiothoracic          PT ASSESSMENT (last 12 hours)      Physical Therapy Evaluation     Row Name 05/09/19 1248          PT Evaluation Time/Intention    Subjective Information  complains of;weakness;pain;dyspnea nsgAmbRA;desat.low80's(86% s/pSev.min.recovery;sensorDelayed  -DM     Document Type  discharge evaluation/summary  -DM     Mode of Treatment  individual therapy;physical therapy  -DM     Patient Effort  good  -DM     Symptoms Noted During/After Treatment  fatigue;increased pain;shortness of breath;significant change in vital signs  -DM     Comment  notes \"mouthBreather;coldHands s/pEso.CA sx(diffic.to get accurate pulse oxim readings);\"just stopped smoking 10 D.ago;will be hard to not resume it\";wife/pt/PT discussed Jorge.patch,gum (\"has box of gum@home\");relayed pulse ox.readings to nsg; will req. home o2 for pt;awaiting Life Vest fitting   -DM     Row Name 05/09/19 1244          General Information    Patient Profile Reviewed?  yes  -DM     Onset of Illness/Injury or Date of Surgery  04/30/19  -DM     Referring Physician  MD Alvino  -DM     Patient Observations  alert;cooperative;agree to therapy  -DM     Patient/Family Observations  wife correcting pt inaccur.reporting last smoking session(pt states 1 mo.;wife:10 D.);wife states pt.sedentary(pt claims \"working/walking all day\")  -DM     General Observations of Patient  UIC;O2,tele;pt pulled o2 off & stated \"let's go\",then stood up impulsively & began amb to door w/o gt belt;PT,wife coaxed to sit UIC;init. RA sat.standing w/o 02=84% (Decr.to  83% w/MIP@sink);called nsg to report readings;nsg REQ. port pulse ox readings w/ gt on " o2/stairs(PT brought pulse oximeter); placed on 02;sat. 90 % w/standing;decr to 86% w/ amb rivera,85% w/ stairs on 2L    -DM     Prior Level of Function  independent:;all household mobility;community mobility;gait;transfer;bed mobility;ADL's;driving  -DM     Equipment Currently Used at Home  nebulizer  -DM     Pertinent History of Current Functional Problem  h/o eso.CA resect 11 yr ago;cont. smoking;onset persist.BLE edema, fatigue 1/'19; to [facility] ER;DX: AF W/RVR;PERICARD eff./tamponade, 80 % LAD sten., ICM, EF 30 %,severe COPD, tachy arrythm; 5-6: had pericard.window ( L thoracot.approach); will be fitted w/ Life vest  -DM     Existing Precautions/Restrictions  cardiac;fall;oxygen therapy device and L/min  -DM     Risks Reviewed  patient:;spouse/S.O.:;LOB;increased discomfort;change in vital signs;increased drainage;lines disloged  -DM     Benefits Reviewed  patient:;spouse/S.O.:;improve function;increase independence;increase strength;decrease pain;increase knowledge  -DM     Barriers to Rehab  none identified  -DM     Row Name 05/09/19 1248          Relationship/Environment    Primary Source of Support/Comfort  spouse  -DM     Lives With  spouse  -DM     Family Caregiver if Needed  spouse  -DM     Primary Roles/Responsibilities  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  -DM     Row Name 05/09/19 1248          Resource/Environmental Concerns    Current Living Arrangements  home/apartment/condo  -DM     Resource/Environmental Concerns  none  -DM     Row Name 05/09/19 1248          Home Main Entrance    Number of Stairs, Main Entrance  one  -DM     Stair Railings, Main Entrance  railing on right side (ascending)  -DM     Row Name 05/09/19 1248          Cognitive Assessment/Interventions    Additional Documentation  Cognitive Assessment/Intervention (Group)  -DM     Row Name 05/09/19 1248          Cognitive Assessment/Intervention- PT/OT    Affect/Mental Status (Cognitive)  anxious  -DM     Orientation Status (Cognition)   oriented x 4  -DM     Follows Commands (Cognition)  follows one step commands;over 90% accuracy  -DM     Safety Deficit (Cognitive)  mild deficit;impulsivity;insight into deficits/self awareness;safety precautions follow-through/compliance  -DM     Personal Safety Interventions  fall prevention program maintained;gait belt;nonskid shoes/slippers when out of bed  -DM     Row Name 05/09/19 1248          Safety Issues, Functional Mobility    Impairments Affecting Function (Mobility)  balance;endurance/activity tolerance;pain;shortness of breath;strength  -DM     Row Name 05/09/19 1248          Bed Mobility Assessment/Treatment    Comment (Bed Mobility)  UIC  -DM     Row Name 05/09/19 1248          Transfer Assessment/Treatment    Transfer Assessment/Treatment  sit-stand transfer;stand-sit transfer  -DM     Sit-Stand [place] (Transfers)  independent  -DM     Stand-Sit [place] (Transfers)  independent  -DM     Row Name 05/09/19 1248          Gait/Stairs Assessment/Training    Gait/Stairs Assessment/Training  gait/ambulation independence;gait/ambulation assistive device  -DM     [place] Level (Gait)  supervision  -DM     Assistive Device (Gait)  other (see comments) GT.belt;PT brought portPulseOx,o2  -DM     Distance in Feet (Gait)  350  -DM     Pattern (Gait)  step-through  -DM     Deviations/Abnormal Patterns (Gait)  kaleigh decreased  -DM     Bilateral Gait Deviations  heel strike decreased  -DM     Negotiation (Stairs)  stairs independence  -DM     [place] Level (Stairs)  verbal cues;contact guard  -DM     Handrail Location (Stairs)  right side (ascending)  -DM     Number of Steps (Stairs)  2 0kebpr2 trials;cues for PLB;Desat to 85%;w/1min.std.rest86%  -DM     Ascending Technique (Stairs)  step-to-step  -DM     Descending Technique (Stairs)  step-to-step  -DM     Stairs, Safety Issues  balance decreased during turns  -DM     Stairs, Impairments  strength decreased  -DM     Comment (Gait/Stairs)   cues for PLB (MouthBreather by habit)  -DM     Row Name 05/09/19 1248          General ROM    GENERAL ROM COMMENTS  no LE deficits  -DM     Row Name 05/09/19 1248          MMT (Manual Muscle Testing)    General MMT Comments  B hip flex 4/5  -DM     Row Name 05/09/19 1248          Motor Assessment/Intervention    Additional Documentation  Balance (Group);Balance Interventions (Group);Therapeutic Exercise (Group);Therapeutic Exercise Interventions (Group)  -DM     Row Name 05/09/19 1248          Therapeutic Exercise    03612 - PT Therapeutic Exercise Minutes  9  -DM     44650 - PT Therapeutic Activity Minutes  14  -DM     Row Name 05/09/19 1248          Therapeutic Exercise    Upper Extremity Range of Motion (Therapeutic Exercise)  shoulder flexion/extension, bilateral;shoulder abduction/adduction, bilateral;elbow flexion/extension, bilateral  -DM     Lower Extremity (Therapeutic Exercise)  LAQ (long arc quad), bilateral;marching while seated  -DM     Lower Extremity Range of Motion (Therapeutic Exercise)  ankle dorsiflexion/plantar flexion, bilateral  -DM     Exercise Type (Therapeutic Exercise)  AROM (active range of motion)  -DM     Position (Therapeutic Exercise)  seated  -DM     Sets/Reps (Therapeutic Exercise)  1/10  -DM     Comment (Therapeutic Exercise)  rests btwn sets;ret. w/ HEP/instructed  -DM     Row Name 05/09/19 1248          Balance    Balance  static sitting balance;static standing balance;dynamic sitting balance;dynamic standing balance  -DM     Row Name 05/09/19 1248          Static Sitting Balance    Level of [place] (Unsupported Sitting, Static Balance)  independent  -DM     Sitting Position (Unsupported Sitting, Static Balance)  sitting in chair  -DM     Time Able to Maintain Position (Unsupported Sitting, Static Balance)  more than 5 minutes  -DM     Comment (Unsupported Sitting, Static Balance)  LE exer;PLB;Mult o2 sat.& BP readings  -DM     Row Name 05/09/19 1248          Dynamic Sitting  Balance    Level of [place], Reaches Outside Midline (Sitting, Dynamic Balance)  independent  -DM     Sitting Position, Reaches Outside Midline (Sitting, Dynamic Balance)  sitting in chair  -DM     Comment, Reaches Outside Midline (Sitting, Dynamic Balance)  recip scooting  -DM     Row Name 05/09/19 1248          Static Standing Balance    Level of [place] (Supported Standing, Static Balance)  independent  -DM     Time Able to Maintain Position (Supported Standing, Static Balance)  3 to 4 minutes  -DM     Comment (Supported Standing, Static Balance)  trunk ext in mirror;PLB  -DM     Row Name 05/09/19 1248          Dynamic Standing Balance    Level of [place], Reaches Outside Midline (Standing, Dynamic Balance)  contact guard assist  -DM     Time Able to Maintain Position, Reaches Outside Midline (Standing, Dynamic Balance)  1 to 2 minutes  -DM     Assistive Device Utilized (Supported Standing, Dynamic Balance)  other (see comments) Gt belt  -DM     Comment, Reaches Outside Midline (Standing, Dynamic Balance)  MIP;init sidesteps R  -DM     Row Name 05/09/19 1248          Pain Assessment    Additional Documentation  Pain Scale: FACES Pre/Post-Treatment (Group)  -DM     Row Name 05/09/19 1248          Pain Scale: Numbers Pre/Post-Treatment    Pain Location - Side  Left  -DM     Pain Location - Orientation  incisional  -DM     Pain Location  chest  -DM     Pain Intervention(s)  Repositioned;Rest  -DM     Row Name 05/09/19 1248          Pain Scale: FACES Pre/Post-Treatment    Pain: FACES Scale, Pretreatment  2-->hurts little bit  -DM     Pain: FACES Scale, Post-Treatment  4-->hurts little more  -DM     Row Name             Wound 05/06/19 0817 Other (See comments) chest incision    Wound - Properties Group Date first assessed: 05/06/19  -KS Time first assessed: 0817  -KS Side: Other (See comments)  -KS Location: chest  -KS Type: incision  -KS    Row Name 05/09/19 1248          Plan of Care Review    Plan of  Care Reviewed With  patient;spouse  -DM     Row Name 05/09/19 1248          Physical Therapy Clinical Impression    Date of Referral to PT  05/08/19  -DM     PT Diagnosis (PT Clinical Impression)  impaired funct mobil  -DM     Criteria for Skilled Interventions Met (PT Clinical Impression)  other (see comments) eval/rx only  -DM     Pathology/Pathophysiology Noted (Describe Specifically for Each System)  cardiovascular;pulmonary  -DM     Impairments Found (describe specific impairments)  gait, locomotion, and balance  -DM     Rehab Potential (PT Clinical Summary)  good, to achieve stated therapy goals  -DM     Care Plan Review (PT)  evaluation/treatment results reviewed;patient/other agree to care plan  -DM     Care Plan Review, Other Participant (PT Clinical Impression)  spouse  -DM     Row Name 05/09/19 1248          Vital Signs    Pre Systolic BP Rehab  118  -DM     Pre Treatment Diastolic BP  67  -DM     Intra Systolic BP Rehab  110  -DM     Intra Treatment Diastolic BP  68  -DM     Post Systolic BP Rehab  127  -DM     Post Treatment Diastolic BP  77  -DM     Pretreatment Heart Rate (beats/min)  78  -DM     Intratreatment Heart Rate (beats/min)  94  -DM     Posttreatment Heart Rate (beats/min)  89  -DM     Pre SpO2 (%)  92  -DM     O2 Delivery Pre Treatment  supplemental O2 2 L at rest  -DM     Intra SpO2 (%)  83  -DM     O2 Delivery Intra Treatment  room air 83% MIPonRA;S0wgdsc=91%;86%gtHall(2L),85%stairs(2L)  -DM     Post SpO2 (%)  90  -DM     O2 Delivery Post Treatment  supplemental O2 returned to chair on 2L  -DM     Pre Patient Position  Sitting  -DM     Intra Patient Position  Standing  -DM     Post Patient Position  Sitting  -DM     Recovery Time  2  -DM     Row Name 05/09/19 1248          Physical Therapy Goals    Transfer Goal Selection (PT)  transfer, PT goal 1  -DM     Gait Training Goal Selection (PT)  gait training, PT goal 1  -DM     Stairs Goal Selection (PT)  stairs, PT goal 1  -DM     Additional  Documentation  Stairs Goal Selection (PT) (Row)  -DM     Row Name 05/09/19 1248          Transfer Goal 1 (PT)    Activity/Assistive Device (Transfer Goal 1, PT)  sit-to-stand/stand-to-sit  -DM     [place] Level/Cues Needed (Transfer Goal 1, PT)  independent  -DM     Time Frame (Transfer Goal 1, PT)  short term goal (STG);1 day  -DM     Progress/Outcome (Transfer Goal 1, PT)  goal met  -DM     Row Name 05/09/19 1248          Gait Training Goal 1 (PT)    Activity/Assistive Device (Gait Training Goal 1, PT)  gait (walking locomotion) STABLE VS;o2 sat.>92%  -DM     [place] Level (Gait Training Goal 1, PT)  supervision required  -DM     Distance (Gait Goal 1, PT)  350  -DM     Time Frame (Gait Training Goal 1, PT)  short term goal (STG);1 day  -DM     Progress/Outcome (Gait Training Goal 1, PT)  goal partially met  -DM     Row Name 05/09/19 1248          Stairs Goal 1 (PT)    Activity/Assistive Device (Stairs Goal 1, PT)  stairs, all skills;using handrail, right  -DM     [place] Level/Cues Needed (Stairs Goal 1, PT)  contact guard assist  -DM     Number of Stairs (Stairs Goal 1, PT)  2  -DM     Time Frame (Stairs Goal 1, PT)  short term goal (STG);1 day  -DM     Progress/Outcome (Stairs Goal 1, PT)  goal met  -DM     Row Name 05/09/19 1248          Patient Education Goal (PT)    Activity (Patient Education Goal, PT)  HEP exer  -DM     [place]/Cues/Accuracy (Memory Goal 2, PT)  demonstrates adequately;verbalizes understanding  -DM     Time Frame (Patient Education Goal, PT)  short term goal (STG);1 day  -DM     Progress/Outcome (Patient Education Goal, PT)  goal met  -DM     Row Name 05/09/19 1248          Positioning and Restraints    Pre-Treatment Position  sitting in chair/recliner  -DM     Post Treatment Position  chair  -DM     In Chair  notified nsg;reclined;call light within reach;encouraged to call for assist;with family/caregiver;legs elevated  -DM     Row Name 05/09/19 1248          Living  Environment    Home Accessibility  stairs to enter home  -       User Key  (r) = Recorded By, (t) = Taken By, (c) = Cosigned By    Initials Name Provider Type    DM Daily Pierre, PT Physical Therapist    Shahnaz Lopez, RN Registered Nurse          Physical Therapy Education     Title: PT OT SLP Therapies (In Progress)     Topic: Physical Therapy (Done)     Point: Mobility training (Done)     Learning Progress Summary           Patient Eager, E,D,H, DU,VU by DM at 5/9/2019  2:15 PM   Significant Other Eager, E,D,H, DU,VU by DM at 5/9/2019  2:15 PM                   Point: Home exercise program (Done)     Learning Progress Summary           Patient Eager, E,D,H, DU,VU by DM at 5/9/2019  2:15 PM   Significant Other Eager, E,D,H, DU,VU by DM at 5/9/2019  2:15 PM                   Point: Body mechanics (Done)     Learning Progress Summary           Patient Eager, E,D,H, DU,VU by DM at 5/9/2019  2:15 PM   Significant Other Eager, E,D,H, DU,VU by DM at 5/9/2019  2:15 PM                   Point: Precautions (Done)     Learning Progress Summary           Patient Eager, E,D,H, DU,VU by DM at 5/9/2019  2:15 PM   Significant Other Eager, E,D,H, DU,VU by DM at 5/9/2019  2:15 PM                               User Key     Initials Effective Dates Name Provider Type Discipline     06/19/15 -  Daily Pierre, PT Physical Therapist PT                PT Recommendation and Plan  Anticipated Discharge Disposition (PT): home with assist  Planned Therapy Interventions (PT Eval): gait training, home exercise program, patient/family education, stair training, strengthening, transfer training  Therapy Frequency (PT Clinical Impression): evaluation only  Outcome Summary/Treatment Plan (PT)  Anticipated Discharge Disposition (PT): home with assist  Plan of Care Reviewed With: patient, spouse  Progress: improving  Outcome Summary: Presents s/p pericard. window via L thoracot.for Pleur.Effus, decr. strength/endurance, &  impaired funct mobil; able to amb 350 ft w/ Supv. on 2 L o2 & 2 steps w/ R rail, + ther ex all 4 extrem (warmup/cooldowns per HEP); noted desat to 83% init. on RA,then placed on 2L & desat to 85% on stairs & 86% in rivera;recovered to 90% after 2 min.sit.rest; pt met mobil. goals & is indep in HEP (Supv of spouse); will d/c home w/ assist later today after fitted w/ life vest     Outcome Measures     Row Name 05/09/19 1248 05/09/19 0750          How much help from another person do you currently need...    Turning from your back to your side while in flat bed without using bedrails?  4  -DM  --     Moving from lying on back to sitting on the side of a flat bed without bedrails?  4  -DM  --     Moving to and from a bed to a chair (including a wheelchair)?  4  -DM  --     Standing up from a chair using your arms (e.g., wheelchair, bedside chair)?  4  -DM  --     Climbing 3-5 steps with a railing?  3  -DM  --     To walk in hospital room?  4  -DM  --     AM-PAC 6 Clicks Score  23  -DM  --        How much help from another is currently needed...    Putting on and taking off regular lower body clothing?  --  4  -TA     Bathing (including washing, rinsing, and drying)  --  4  -TA     Toileting (which includes using toilet bed pan or urinal)  --  4  -TA     Putting on and taking off regular upper body clothing  --  4  -TA     Taking care of personal grooming (such as brushing teeth)  --  4  -TA     Eating meals  --  4  -TA     Score  --  24  -TA        Functional Assessment    Outcome Measure Options  AM-PAC 6 Clicks Basic Mobility (PT)  -DM  AM-PAC 6 Clicks Daily Activity (OT)  -TA       User Key  (r) = Recorded By, (t) = Taken By, (c) = Cosigned By    Initials Name Provider Type    DM Daily Pierre, PT Physical Therapist    TA Ravindra Robles, OT Occupational Therapist           Time Calculation:   PT Charges     Row Name 05/09/19 1416 05/09/19 1248          Time Calculation    Start Time  1248  -DM  --     PT  Received On  05/09/19  -DM  --     PT Goal Re-Cert Due Date  05/19/19  -DM  --        Time Calculation- PT    Total Timed Code Minutes- PT  23 minute(s)  -DM  --        Timed Charges    91294 - PT Therapeutic Exercise Minutes  --  9  -DM     00487 - PT Therapeutic Activity Minutes  --  14  -DM       User Key  (r) = Recorded By, (t) = Taken By, (c) = Cosigned By    Initials Name Provider Type    Daily Anaya, PT Physical Therapist        Therapy Charges for Today     Code Description Service Date Service Provider Modifiers Qty    52858618667 HC PT THER PROC EA 15 MIN 5/9/2019 Daily Pierre, PT GP 1    82111884345 HC PT THERAPEUTIC ACT EA 15 MIN 5/9/2019 Daily Pierre, PT GP 1    29174114626 HC PT EVAL LOW COMPLEXITY 4 5/9/2019 Daily Pierre, PT GP 1          PT G-Codes  Outcome Measure Options: AM-PAC 6 Clicks Basic Mobility (PT)  AM-PAC 6 Clicks Score: 23  Score: 24    PT Discharge Summary  Anticipated Discharge Disposition (PT): home with assist  Reason for Discharge: Discharge from facility  Outcomes Achieved: Able to achieve all goals within established timeline  Discharge Destination: Home with assist    Daily Pierre, PT  5/9/2019

## 2019-05-09 NOTE — DISCHARGE SUMMARY
[facility] Medicine Services  DISCHARGE SUMMARY    Patient Name: Francesco Arroyo  : 1945  MRN: [id number]    Date of Admission: 2019  Date of Discharge:  2019  Primary Care Physician: Alex Velez DO    Consults     Date and Time Order Name Status Description    2019 0748 Inpatient Pulmonology Consult Completed     2019 0030 Inpatient Cardiology Consult Completed           Hospital Course     Presenting Problem:   Atrial fibrillation with RVR (CMS/HCC) [I48.91]    Active Hospital Problems    Diagnosis  POA   • **Atrial fibrillation with RVR (CMS/HCC) [I48.91]  Yes   • History of esophageal cancer [Z85.01]  Yes   • Type 2 diabetes mellitus (CMS/HCC) [E11.9]  Yes   • COPD (chronic obstructive pulmonary disease), Severe physiology, FEV1 0.9L (34%) [J44.9]  Yes   • GERD without esophagitis [K21.9]  Yes   • Elevated lactic acid level [R79.89]  Yes   • Acute CHF (CMS/HCC) [I50.9]  Yes   • Hyponatremia [E87.1]  Yes   • Bilateral pleural effusion [J90]  Yes   • Pericardial effusion (noninflammatory) [I31.3]  Unknown   • Acute on chronic systolic CHF (congestive heart failure) (CMS/HCC) [I50.23]  Unknown   • Left ventricular dysfunction [I51.9]  Unknown   • Coronary artery disease involving native heart with angina pectoris (CMS/HCC) [I25.119]  Unknown      Resolved Hospital Problems   No resolved problems to display.          Hospital Course:  Francesco Arroyo  is a 74yo M with a history of esophageal cancer s/p radiation, chemotherapy, and resection in , Stage IV COPD, Systolic heart failure, CAD, and a pericardial effusion who is admitted to the ICU after a pericardial window on  by Dr. Pearson.     Patient did well following pericardial window. Did have a repeat ECHO done  prior to discharge, with EF estimated at 30%. Patient was recommended to have a life-vest prior to discharge and was fitted for this. He will follow up with cardiology in 1  month.    Patient's heart failure felt to be multifactorial (related to severe CAD, pericardial effusion). Medical management recommended. Cardiology followed during admission and assisted with medication adjustments.     In regards to patient's tachy-arrythmia, felt to be possibly MAT, was placed on amiodarone, which he will continue for 1month and as above have close follow up with cardiology.     Patient was noted to require oxygen during admission and at time of discharge. This was felt to be related to severe COPD as well as exacerbation of heart failure. He will have close follow up with PCP who can refer to pulmonary as needed.           Discharge Follow Up Recommendations for labs/diagnostics:  PCP in 1-2 weeks   Cardiology in 4 weeks       Day of Discharge     HPI:   Would like to go home. Has no complaints this morning. Wife at bedside.     Review of Systems      Otherwise ROS is negative except as mentioned in the HPI.    Vital Signs:   Temp:  [97.6 °F (36.4 °C)-98.1 °F (36.7 °C)] 98 °F (36.7 °C)  Heart Rate:  [71-84] 78  Resp:  [16-20] 16  BP: (118-131)/(67-90) 118/67     Physical Exam:  GEN: NAD, resting in chair, awake  HEENT: on room air, atraumatic, normocephalic  NECK: supple, no masses  RESP: on 3 L NC, normal effort   CV: on tele, sinus rhythm  PSYCH: normal affect, appropriate  NEURO: awake, alert, no focal deficits noted  MSK: trace edema noted  SKIN: no rashes noted              Pertinent  and/or Most Recent Results     Results from last 7 days   Lab Units 05/09/19  0840 05/08/19  0458 05/07/19  0228 05/06/19  1702 05/05/19  0520 05/04/19  0401 05/03/19  0501   WBC 10*3/mm3 10.06  --  12.12* 8.57 10.13 14.84* 8.60   HEMOGLOBIN g/dL 12.6*  --  11.6* 12.0* 11.8* 11.7* 12.1*   HEMATOCRIT % 40.7  --  35.5* 37.8 36.8* 37.1* 37.2*   PLATELETS 10*3/mm3 317  --  266 260 237 258 254   SODIUM mmol/L 132* 130* 132*  --  134* 138  139 133*   POTASSIUM mmol/L 5.5* 4.5 4.7  --  4.3 3.9  3.7 3.5   CHLORIDE  mmol/L 98 93* 97*  --  94* 96*  97* 95*   CO2 mmol/L 26.0 25.0 24.0  --  27.0 27.0  28.0 26.0   BUN mg/dL 12 16 15  --  15 14  14 11   CREATININE mg/dL 1.07 0.95 0.86  --  1.08 0.91  1.06 0.76   GLUCOSE mg/dL 213* 124* 226*  --  184* 145*  151* 205*   CALCIUM mg/dL 8.9 9.1 8.5*  --  9.2 9.1  8.7 8.8     Results from last 7 days   Lab Units 05/04/19  0401   BILIRUBIN mg/dL 0.7   ALK PHOS U/L 78   ALT (SGPT) U/L 7   AST (SGOT) U/L 9     Results from last 7 days   Lab Units 05/04/19  0401 05/03/19  0501   CHOLESTEROL mg/dL 100 102   TRIGLYCERIDES mg/dL 81 80   HDL CHOL mg/dL 40 38*           Brief Urine Lab Results  (Last result in the past 365 days)      Color   Clarity   Blood   Leuk Est   Nitrite   Protein   CREAT   Urine HCG        04/30/19 1744 Yellow Clear Negative Negative Negative Negative               Microbiology Results Abnormal     Procedure Component Value - Date/Time    Tissue / Bone Culture - Tissue, Pericardium [[id number]] Collected:  05/06/19 0808    Lab Status:  Preliminary result Specimen:  Tissue from Pericardium Updated:  05/08/19 0924     Tissue Culture No growth at 2 days     Gram Stain Rare (1+) WBCs seen      No organisms seen    Body Fluid Culture - Body Fluid, Pericardium [[id number]] Collected:  05/06/19 0806    Lab Status:  Preliminary result Specimen:  Body Fluid from Pericardium Updated:  05/08/19 0924     Body Fluid Culture No growth at 2 days     Gram Stain Occasional WBCs seen      No organisms seen    AFB Culture - Tissue, Pericardium [[id number]] Collected:  05/06/19 0808    Lab Status:  Preliminary result Specimen:  Tissue from Pericardium Updated:  05/07/19 1131     AFB Stain No acid fast bacilli seen on concentrated smear    AFB Culture - Body Fluid, Pericardium [[id number]] Collected:  05/06/19 0806    Lab Status:  Preliminary result Specimen:  Body Fluid from Pericardium Updated:  05/07/19 1131     AFB Stain No acid fast bacilli seen on concentrated smear          Imaging  Results (all)     Procedure Component Value Units Date/Time    XR Chest 1 View [[id number]] Collected:  05/07/19 1026     Updated:  05/07/19 2210    Narrative:       EXAMINATION: XR CHEST 1 VW-05/07/2019:      INDICATION: Postop; I31.3-Pericardial effusion (noninflammatory);  R60.0-Localized edema; I48.91-Unspecified atrial fibrillation;  I50.23-Acute on chronic systolic (congestive) heart failure; I51.9-Heart  disease, unspecified; I25.119-Atherosclerotic heart disease of native  coronary artery with unspecified angina pectoris; I30.9-Acute  pericarditis, unspecified.      COMPARISON: 05/06/2019.     FINDINGS:  The heart is upper normal size. The vasculature appears  cephalized. The patient's diffuse interstitial disease appears  unchanged. No lung consolidation is seen. Left thoracotomy tube remains  in place. There is no evidence of pneumothorax.           Impression:       Stable post left thoracotomy tube chest, with no evidence of  pneumothorax. Stable diffuse pulmonary interstitial disease.     D:  05/07/2019  E:  05/07/2019     This report was finalized on 5/7/2019 10:07 PM by DR. James Gaspar MD.       XR Chest 1 View [[id number]] Collected:  05/06/19 0939     Updated:  05/06/19 1601    Narrative:       EXAMINATION: XR CHEST 1 VW-      INDICATION: Postop; I31.3-Pericardial effusion (noninflammatory);  R60.0-Localized edema; I48.91-Unspecified atrial fibrillation;  I50.23-Acute on chronic systolic (congestive) heart failure; I51.9-Heart  disease, unspecified; I25.119-Atherosclerotic heart disease of native  coronary artery with unspecified angina pectoris; I30.9-Acute  pericarditis, unspecified.      COMPARISON: 04/30/2019.     FINDINGS: Portable chest reveals heart to be borderline enlarged with a  chest tube identified on the left. No pneumothorax. Increased markings  seen within the right mid and lower lung field. Degenerative change is  seen within the spine. Pulmonary vascularity is within normal limits.              Impression:       Chest tube on the left with no definite pneumothorax.  Minimal increased markings in the right midlung.     D:  05/06/2019  E:  05/06/2019     This report was finalized on 5/6/2019 3:58 PM by Dr. Helena Ortega MD.       CT Angiogram Chest With & Without Contrast [[id number]] Collected:  05/02/19 1757     Updated:  05/03/19 1757    Narrative:       EXAMINATION: CT ANGIOGRAM CHEST W/WO CONTRAST - 05/02/2019      INDICATION: Pericarditis.     TECHNIQUE: Spiral acquisition 3 mm post-IV contrast images through the  chest with coronal and sagittal 2D reconstructions.     The radiation dose reduction device was turned on for each scan per the  ALARA (As Low as Reasonably Achievable) protocol.     COMPARISON: 11/29/2012 CT scan.     FINDINGS: History indicates pericarditis, cardiomyopathy, pericardial  effusion.     Note is made of patient's gastric pull-up. There are shotty mediastinal  lymph nodes which are unchanged from 2012. There is a small to moderate  pericardial effusion. There is good contrast opacification of the  pulmonary arteries. No filling defects are seen to suggest pulmonary  embolic disease.     Lung window images show small symmetric pleural effusions and mild right  basilar bullous change. There is minimal associated atelectasis.     Included images of the upper abdomen show prominent left liver lobe and  slightly nodular liver capsule, equivocal for cirrhosis. The spleen is  not enlarged. Dense material in the gallbladder could represent  gallbladder sludge or vicarious excretion of contrast. Pancreas appears  atrophic and contains multiple calcifications consistent with chronic  pancreatitis. No acute pancreatitis is seen. Adrenal glands appear  unremarkable. Left kidney is not identified. Right kidney appears  hyperplastic.       Impression:       1. Small to moderate pericardial effusion.  2. Small bilateral pleural effusions.  3. Stable shotty mediastinal lymph  "nodes since 2012.  4. Changes of chronic pancreatitis. No evidence of acute pancreatitis.  5. Normal variant contrast excretion into the gallbladder versus  gallbladder \"sludge.\"  6. No evidence of pulmonary embolus or other acute disease elsewhere.     DICTATED:   05/02/2019  EDITED/ls :   05/02/2019      This report was finalized on 5/3/2019 5:53 PM by DR. James Gaspar MD.       XR Chest 1 View [[id number]] Collected:  04/30/19 1635     Updated:  04/30/19 1859    Narrative:       EXAMINATION: XR CHEST 1 VW-04/30/2019:      INDICATION: SOA, triage protocol.      COMPARISON: NONE.     FINDINGS: Cardiac size borderline enlarged. Chronic lung changes without  focal opacification or consolidation. Probable trace bilateral pleural  effusions with mild interstitial prominence. No pneumothorax.           Impression:       Borderline cardiomegaly with trace bilateral pleural  effusions and chronic lung changes.     D:  04/30/2019  E:  04/30/2019        This report was finalized on 4/30/2019 6:56 PM by Dr. Yared Lemus.             Results for orders placed during the hospital encounter of 04/30/19   Duplex Carotid Ultrasound CAR    Narrative · Proximal right internal carotid artery plaque without significant   stenosis.  · Right internal carotid artery stenosis of 0-49%.  · Proximal left internal carotid artery plaque without significant   stenosis.  · Left internal carotid artery stenosis of 0-49%.          Results for orders placed during the hospital encounter of 04/30/19   Duplex Carotid Ultrasound CAR    Narrative · Proximal right internal carotid artery plaque without significant   stenosis.  · Right internal carotid artery stenosis of 0-49%.  · Proximal left internal carotid artery plaque without significant   stenosis.  · Left internal carotid artery stenosis of 0-49%.          Results for orders placed during the hospital encounter of 04/30/19   Adult Transthoracic Echo Limited W/ Cont if Necessary Per Protocol    " Narrative · Estimated EF = 30%.  · The left ventricular cavity is borderline dilated.  · Adjacent to the right ventricle.  · There is calcification of the aortic valve.           Order Current Status    Anaerobic Culture - Body Fluid, Pericardium In process    Fungus Culture - Body Fluid, Pericardium In process    Fungus Culture - Tissue, Pericardium In process    Fungus Smear - Body Fluid, Pericardium In process    Fungus Smear - Tissue, Pericardium In process    AFB Culture - Body Fluid, Pericardium Preliminary result    AFB Culture - Tissue, Pericardium Preliminary result    Body Fluid Culture - Body Fluid, Pericardium Preliminary result    Tissue / Bone Culture - Tissue, Pericardium Preliminary result        Discharge Details        Discharge Medications      New Medications      Instructions Start Date   amiodarone 200 MG tablet  Commonly known as:  PACERONE   200 mg, Oral, Every 12 Hours Scheduled      atorvastatin 10 MG tablet  Commonly known as:  LIPITOR   10 mg, Oral, Nightly      metoprolol tartrate 6.25 MG  Commonly known as:  LOPRESSOR   12.5 mg, Oral, Every 12 Hours Scheduled      nicotine 21 MG/24HR patch  Commonly known as:  NICODERM CQ   1 patch, Transdermal, Every 24 Hours Scheduled         Continue These Medications      Instructions Start Date   albuterol (2.5 MG/3ML) 0.083% nebulizer solution  Commonly known as:  PROVENTIL   USE CONTENTS OF 1 VIAL VIA NEBULIZER EVERY 6 HOURS      aspirin 81 MG EC tablet   81 mg, Oral, Daily      esomeprazole 40 MG capsule  Commonly known as:  nexIUM   TAKE ONE CAPSULE BY MOUTH EVERY DAY      furosemide 20 MG tablet  Commonly known as:  LASIX   20 mg, Oral, Daily      glyBURIDE-metFORMIN 5-500 MG per tablet  Commonly known as:  GLUCOVANCE   1 tablet, Oral, 2 Times Daily With Meals      HYDROcodone-acetaminophen 5-325 MG per tablet  Commonly known as:  NORCO   1 tablet, Oral, Every 8 Hours PRN         Stop These Medications    doxycycline 100 MG capsule  Commonly  known as:  VIBRAMYCIN            No Known Allergies      Discharge Disposition:  Home or Self Care    Discharge Diet:  Diet Order   Procedures   • Diet Regular; Cardiac, Consistent Carbohydrate         Discharge Activity:     As tolerated    CODE STATUS:    Code Status and Medical Interventions:   Ordered at: 04/30/19 2041     Code Status:    CPR     Medical Interventions (Level of Support Prior to Arrest):    Full         No future appointments.    Additional Instructions for the Follow-ups that You Need to Schedule     Discharge Follow-up with PCP   As directed       Currently Documented PCP:    Alex Velez DO    PCP Phone Number:    [phone]     Follow Up Details:  1-2 weeks         Discharge Follow-up with Specified Provider: cardiology; 1 Month   As directed      To:  cardiology    Follow Up:  1 Month               Time Spent on Discharge:  45 minutes    Electronically signed by Roberta Mohan MD, 05/09/19, 11:10 AM.

## 2019-05-09 NOTE — THERAPY DISCHARGE NOTE
Acute Care - Occupational Therapy Initial Eval/Discharge  [facility]     Patient Name: Francesco Arroyo  : 1945  MRN: [id number]  Today's Date: 2019  Onset of Illness/Injury or Date of Surgery: 19  Date of Referral to OT: 19  Referring Physician: Dr Mohan      Admit Date: 2019       ICD-10-CM ICD-9-CM   1. Pericardial effusion (noninflammatory) I31.3 423.9   2. Pedal edema R60.0 782.3   3. Atrial fibrillation with rapid ventricular response (CMS/HCC) I48.91 427.31   4. Acute on chronic systolic CHF (congestive heart failure) (CMS/HCC) I50.23 428.23     428.0   5. Left ventricular dysfunction I51.9 429.9   6. Coronary artery disease involving native heart with angina pectoris, unspecified vessel or lesion type (CMS/HCC) I25.119 414.01     413.9   7. Acute pericardial effusion I30.9 420.90     Patient Active Problem List   Diagnosis   • Esophageal cancer (CMS/HCC)   • Atrial fibrillation with RVR (CMS/HCC)   • History of esophageal cancer   • Type 2 diabetes mellitus (CMS/HCC)   • COPD (chronic obstructive pulmonary disease), Severe physiology, FEV1 0.9L (34%)   • GERD without esophagitis   • Elevated lactic acid level   • Acute CHF (CMS/HCC)   • Hyponatremia   • Bilateral pleural effusion   • Pericardial effusion (noninflammatory)   • Acute on chronic systolic CHF (congestive heart failure) (CMS/HCC)   • Left ventricular dysfunction   • Coronary artery disease involving native heart with angina pectoris (CMS/HCC)     Past Medical History:   Diagnosis Date   • Esophageal cancer (CMS/HCC) 2016     Past Surgical History:   Procedure Laterality Date   • CARDIAC CATHETERIZATION N/A 2019    Procedure: Left Heart Cath;  Surgeon: Jayme Castano MD;  Location: [facility] INVASIVE LOCATION;  Service: Cardiovascular   • ESOPHAGUS SURGERY     • OTHER SURGICAL HISTORY  2008    Resection of esophageal cancer   • PERICARDIAL WINDOW N/A 2019    Procedure: PERICARDIAL WINDOW,  THORACOTOMY APPROACH LEFT;  Surgeon: Zaid Pearson MD;  Location: [facility] OR;  Service: Cardiothoracic          OT ASSESSMENT FLOWSHEET (last 12 hours)      Occupational Therapy Evaluation     Row Name 05/09/19 0750                   OT Evaluation Time/Intention    Subjective Information  no complaints  -TA        Document Type  discharge evaluation/summary  -TA        Mode of Treatment  occupational therapy  -TA        Patient Effort  excellent  -TA        Symptoms Noted During/After Treatment  none  -TA           General Information    Patient Profile Reviewed?  yes  -TA        Onset of Illness/Injury or Date of Surgery  04/30/19  -TA        Referring Physician  Dr Mohan  -TA        Patient Observations  alert;cooperative;agree to therapy  -TA        Patient/Family Observations  Wife present in room  -TA        General Observations of Patient  Pt UIC, tele, O2 per NC  -TA        Prior Level of Function  independent:;all household mobility;community mobility;gait;transfer;bed mobility;ADL's  -TA        Equipment Currently Used at Home  nebulizer  -TA        Pertinent History of Current Functional Problem  Pt presents s/p L anterior thoracotomy, pericardial window 05/06 per Dr Pearson  -TA        Existing Precautions/Restrictions  cardiac;fall  -TA        Risks Reviewed  patient:;spouse/S.O.:;LOB;dizziness;increased discomfort;change in vital signs;lines disloged  -TA        Benefits Reviewed  patient:;spouse/S.O.:;improve function;increase independence;increase knowledge  -TA        Barriers to Rehab  none identified  -TA           Relationship/Environment    Lives With  spouse  -TA        Family Caregiver if Needed  spouse  -TA           Resource/Environmental Concerns    Current Living Arrangements  home/apartment/condo  -TA        Resource/Environmental Concerns  none  -TA           Safety Issues, Functional Mobility    Impairments Affecting Function (Mobility)  endurance/activity tolerance  -TA           Bed  Mobility Assessment/Treatment    Comment (Bed Mobility)  Pt UIC  -TA           Functional Mobility    Functional Mobility- Ind. Level  conditional independence  -TA        Functional Mobility- Device  other (see comments) No AD  -TA        Functional Mobility-Distance (Feet)  -- In room, in hallway  -TA        Functional Mobility- Safety Issues  supplemental O2  -TA        Functional Mobility- Comment  Good safety awareness, no LOB  -TA           Transfer Assessment/Treatment    Transfer Assessment/Treatment  sit-stand transfer;stand-sit transfer  -TA           Sit-Stand Transfer    Sit-Stand Tripp (Transfers)  independent  -TA           Stand-Sit Transfer    Stand-Sit Tripp (Transfers)  independent  -TA           ADL Assessment/Intervention    BADL Assessment/Intervention  lower body dressing;toileting  -TA           Lower Body Dressing Assessment/Training    Lower Body Dressing Tripp Level  don;doff;pants/bottoms;shoes/slippers;independent  -TA        Lower Body Dressing Position  unsupported sitting;unsupported standing  -TA           Toileting Assessment/Training    Tripp Level (Toileting)  toileting skills;independent  -TA        Comment (Toileting)  no concerns  -TA           BADL Safety/Performance    Impairments, BADL Safety/Performance  endurance/activity tolerance  -TA        Skilled BADL Treatment/Intervention  energy conservation  -TA           General ROM    GENERAL ROM COMMENTS  BUE AROM WFL  -TA           MMT (Manual Muscle Testing)    General MMT Comments  BUE grossly WFL  -TA           Motor Assessment/Interventions    Additional Documentation  Balance (Group);Balance Interventions (Group)  -TA           Balance    Balance  dynamic standing balance;dynamic sitting balance  -TA           Dynamic Sitting Balance    Level of Tripp, Reaches Outside Midline (Sitting, Dynamic Balance)  independent  -TA        Sitting Position, Reaches Outside Midline (Sitting, Dynamic  Balance)  sitting in chair  -TA        Comment, Reaches Outside Midline (Sitting, Dynamic Balance)  LBD  -TA           Dynamic Standing Balance    Level of [place], Reaches Outside Midline (Standing, Dynamic Balance)  independent  -TA        Time Able to Maintain Position, Reaches Outside Midline (Standing, Dynamic Balance)  1 to 2 minutes  -TA           Sensory Assessment/Intervention    Sensory General Assessment  no sensation deficits identified;other (see comments) BUE intact  -TA           Positioning and Restraints    Pre-Treatment Position  sitting in chair/recliner  -TA        Post Treatment Position  chair  -TA        In Chair  notified nsg;sitting;call light within reach;encouraged to call for assist;with family/caregiver  -TA           Pain Assessment    Additional Documentation  Pain Scale: Numbers Pre/Post-Treatment (Group)  -TA           Pain Scale: Numbers Pre/Post-Treatment    Pain Scale: Numbers, Pretreatment  2/10  -TA        Pain Scale: Numbers, Post-Treatment  2/10  -TA        Pain Location  chest incisional  -TA        Pre/Post Treatment Pain Comment  tolerated  -TA        Pain Intervention(s)  Repositioned;Ambulation/increased activity  -TA           Wound 05/06/19 0817 Other (See comments) chest incision    Wound - Properties Group Date first assessed: 05/06/19  -KS Time first assessed: 0817  -KS Side: Other (See comments)  -KS Location: chest  -KS Type: incision  -KS       Coping    Observed Emotional State  calm;cooperative  -TA        Verbalized Emotional State  acceptance  -TA           Plan of Care Review    Plan of Care Reviewed With  patient;spouse  -TA           Clinical Impression (OT)    Date of Referral to OT  05/08/19  -TA        Functional Level at Time of Evaluation (OT Eval)  Pt at fxl baseline/independent with ADLs, fxl mobility for ADLs  -TA        Patient/Family Goals Statement (OT Eval)  Go home  -TA        Criteria for Skilled Therapeutic Interventions Met (OT Eval)   no;current level of function same as previous level of function  -TA        Care Plan Review (OT)  evaluation/treatment results reviewed;risks/benefits reviewed;patient/other agree to care plan  -TA        Care Plan Review, Other Participant (OT Eval)  spouse  -TA        Anticipated Discharge Disposition (OT)  home with assist  -TA           Vital Signs    Pre Systolic BP Rehab  -- VSS; RN cleared pt for tx  -TA        Pre SpO2 (%)  93  -TA        O2 Delivery Pre Treatment  supplemental O2  -TA        Intra SpO2 (%)  90  -TA        O2 Delivery Intra Treatment  supplemental O2  -TA        Post SpO2 (%)  92  -TA        O2 Delivery Post Treatment  supplemental O2  -TA        Pre Patient Position  Sitting  -TA        Intra Patient Position  Standing  -TA        Post Patient Position  Sitting  -TA           Planned OT Interventions    Planned Therapy Interventions (OT Eval)  -- No goals established  -TA          User Key  (r) = Recorded By, (t) = Taken By, (c) = Cosigned By    Initials Name Effective Dates    TA Ravindra Robles OT 03/14/16 -     Shahnaz Lopez RN 05/09/17 -           Occupational Therapy Education     Title: PT OT SLP Therapies (In Progress)     Topic: Occupational Therapy (In Progress)     Point: ADL training (Done)     Description: Instruct learner(s) on proper safety adaptation and remediation techniques during self care or transfers.   Instruct in proper use of assistive devices.    Learning Progress Summary           Patient Acceptance, E, VU by TA at 5/9/2019  8:19 AM    Comment:  EC, pacing with ADLs; reinforced need for call for assist with OOB activities.                   Point: Precautions (Done)     Description: Instruct learner(s) on prescribed precautions during self-care and functional transfers.    Learning Progress Summary           Patient Acceptance, E, VU by TA at 5/9/2019  8:19 AM    Comment:  EC, pacing with ADLs; reinforced need for call for assist with OOB activities.                                User Key     Initials Effective Dates Name Provider Type Discipline    TA 03/14/16 -  Ravindra Robles OT Occupational Therapist OT                OT Recommendation and Plan  Outcome Summary/Treatment Plan (OT)  Anticipated Discharge Disposition (OT): home with assist  Planned Therapy Interventions (OT Eval): (No goals established)  Plan of Care Review  Plan of Care Reviewed With: patient, spouse  Plan of Care Reviewed With: patient, spouse  Outcome Summary: VSS; Pt presents at fxl baseline/independent with ADLs, fxl mobility for ADLs. O2 sats 90% on 2L O2 after ambulation. No AE/DME needs identified. No further skilled acute OT services indicated at this time. Recommend home with assist at discharge.          Outcome Measures     Row Name 05/09/19 0750             How much help from another is currently needed...    Putting on and taking off regular lower body clothing?  4  -TA      Bathing (including washing, rinsing, and drying)  4  -TA      Toileting (which includes using toilet bed pan or urinal)  4  -TA      Putting on and taking off regular upper body clothing  4  -TA      Taking care of personal grooming (such as brushing teeth)  4  -TA      Eating meals  4  -TA      Score  24  -TA         Functional Assessment    Outcome Measure Options  AM-PAC 6 Clicks Daily Activity (OT)  -TA        User Key  (r) = Recorded By, (t) = Taken By, (c) = Cosigned By    Initials Name Provider Type    Ravindra Arteaga OT Occupational Therapist          Time Calculation:   Time Calculation- OT     Row Name 05/09/19 0823             Time Calculation- OT    OT Start Time  0750 ttc 0 minutes  -TA      OT Received On  05/09/19  -TA        User Key  (r) = Recorded By, (t) = Taken By, (c) = Cosigned By    Initials Name Provider Type    Ravindra Arteaga OT Occupational Therapist        Therapy Suggested Charges     Code   Minutes Charges    None           Therapy Charges for Today     Code  Description Service Date Service Provider Modifiers Qty    [id number] HC OT EVAL MOD COMPLEXITY 4 5/9/2019 Ravindra Robles, OT GO 1               OT Discharge Summary  Anticipated Discharge Disposition (OT): home with assist  Reason for Discharge: Independent, At baseline function  Discharge Destination: Home with assist    Ravindra Robles OT  5/9/2019

## 2019-05-09 NOTE — PLAN OF CARE
Problem: Patient Care Overview  Goal: Plan of Care Review  Outcome: Ongoing (interventions implemented as appropriate)   05/09/19 2347   Coping/Psychosocial   Plan of Care Reviewed With patient   Plan of Care Review   Progress improving   OTHER   Outcome Summary Pt rested intermittently during the night, VSS. O2 weaned to 2L NC. Prn lortab given for pain. Plan for discharge home today. Will continue to monitor.

## 2019-05-09 NOTE — PROGRESS NOTES
[facility]  IP Progress Note      Chief Complaint/Reason for service: #1 pericardial effusion status post window #2 tachyarrhythmias probably MAT #3 ischemic cardiomyopathy #4 COPD    Subjective   Subjective: The patient feels great.  He said he is ambulating without difficulty.  He wants to go home today.  He denies nausea vomiting diarrhea    Past medical, surgical, social and family history reviewed in the patient's electronic medical record.    Objective     Vital Sign Min/Max for last 24 hours  Temp  Min: 97.6 °F (36.4 °C)  Max: 98.1 °F (36.7 °C)   BP  Min: 118/67  Max: 131/73   Pulse  Min: 71  Max: 84   Resp  Min: 16  Max: 20   SpO2  Min: 80 %  Max: 95 %   Flow (L/min)  Min: 2  Max: 3      Intake/Output Summary (Last 24 hours) at 5/9/2019 0827  Last data filed at 5/9/2019 0455  Gross per 24 hour   Intake 720 ml   Output 300 ml   Net 420 ml             Current Facility-Administered Medications:   •  acetaminophen (TYLENOL) tablet 650 mg, 650 mg, Oral, Q6H PRN, Justine Bueno APRN  •  acetaminophen (TYLENOL) tablet 650 mg, 650 mg, Oral, Q4H PRN, Jayme Castano MD, 650 mg at 05/08/19 0102  •  amiodarone (PACERONE) tablet 200 mg, 200 mg, Oral, Q12H, Piyush Avitia MD, 200 mg at 05/08/19 2036  •  aspirin EC tablet 81 mg, 81 mg, Oral, Daily, Justine Bueno APRN, 81 mg at 05/08/19 0834  •  atorvastatin (LIPITOR) tablet 10 mg, 10 mg, Oral, Nightly, Valentina Elam APRN, 10 mg at 05/08/19 2036  •  heparin (porcine) 5000 UNIT/ML injection 5,000 Units, 5,000 Units, Subcutaneous, Q12H, Ravindra Morley PA, 5,000 Units at 05/08/19 2039  •  HYDROcodone-acetaminophen (NORCO) 7.5-325 MG per tablet 1 tablet, 1 tablet, Oral, Q6H PRN, Lino Cevallos PA, 1 tablet at 05/09/19 0357  •  insulin detemir (LEVEMIR) injection 20 Units, 20 Units, Subcutaneous, Daily, Case, Syeda V., DO, 20 Units at 05/08/19 7573  •  insulin lispro (humaLOG) injection 0-14 Units, 0-14 Units,  Subcutaneous, 4x Daily With Meals & Nightly, Case, Syeda V., DO, 5 Units at 05/08/19 0838  •  ipratropium-albuterol (DUO-NEB) nebulizer solution 3 mL, 3 mL, Nebulization, Q4H PRN, Justine Bueno APRN  •  ipratropium-albuterol (DUO-NEB) nebulizer solution 3 mL, 3 mL, Nebulization, 4x Daily - RT, Kisha Salinas PA-C, 3 mL at 05/09/19 0732  •  melatonin tablet 5 mg, 5 mg, Oral, Nightly PRN, Rodrigo Palacios MD, 5 mg at 05/07/19 2101  •  metoprolol tartrate (LOPRESSOR) half tablet 12.5 mg, 12.5 mg, Oral, Q12H, Kisha Salinas PA-C, 12.5 mg at 05/08/19 2036  •  nicotine (NICODERM CQ) 21 MG/24HR patch 1 patch, 1 patch, Transdermal, Q24H, Justine Bueno APRN, 1 patch at 05/08/19 0834  •  ondansetron (ZOFRAN) tablet 4 mg, 4 mg, Oral, Q6H PRN, 4 mg at 05/08/19 0834 **OR** ondansetron (ZOFRAN) injection 4 mg, 4 mg, Intravenous, Q6H PRN, Ravindra Morley PA, 4 mg at 05/06/19 1236  •  pantoprazole (PROTONIX) EC tablet 40 mg, 40 mg, Oral, QAM, Justine Bueno APRN, 40 mg at 05/09/19 0644  •  Pharmacy Consult - [facility], , Does not apply, Daily, Brittany Schmidt, WAN  •  sodium chloride 0.9 % flush 3 mL, 3 mL, Intravenous, Q12H, Justine Bueno APRN, 3 mL at 05/08/19 2039    Physical Exam: General Pleasant thin white male sitting in recliner drinking coffee no acute distress current heart rate 81 and sats 94%        HEENT: No JVP, he is wearing nasal O2.       Respiratory: Equal bilateral symmetrical expansion with occasional rhonchi which improved with cough.  Isolated crackle       Cardiovascular: Regular rate and rhythm without murmur gallop or click he does have 1+ pitting edema       GI: Positive bowel sounds       Lower Extremities: No lesions       Neuro: Cranial nerves II through XII appear grossly normal he moves all 4 extremities       Skin: Warm and dry with pitting edema to palpation       Psych: Pleasant affect    Results Review: Intake exceeds output by 2 L.  Today's blood work is pending.  He is  afebrile vital signs are stable.    Radiology Results:  Imaging Results (last 72 hours)     Procedure Component Value Units Date/Time    XR Chest 1 View [[id number]] Collected:  05/07/19 1026     Updated:  05/07/19 2210    Narrative:       EXAMINATION: XR CHEST 1 VW-05/07/2019:      INDICATION: Postop; I31.3-Pericardial effusion (noninflammatory);  R60.0-Localized edema; I48.91-Unspecified atrial fibrillation;  I50.23-Acute on chronic systolic (congestive) heart failure; I51.9-Heart  disease, unspecified; I25.119-Atherosclerotic heart disease of native  coronary artery with unspecified angina pectoris; I30.9-Acute  pericarditis, unspecified.      COMPARISON: 05/06/2019.     FINDINGS:  The heart is upper normal size. The vasculature appears  cephalized. The patient's diffuse interstitial disease appears  unchanged. No lung consolidation is seen. Left thoracotomy tube remains  in place. There is no evidence of pneumothorax.           Impression:       Stable post left thoracotomy tube chest, with no evidence of  pneumothorax. Stable diffuse pulmonary interstitial disease.     D:  05/07/2019  E:  05/07/2019     This report was finalized on 5/7/2019 10:07 PM by DR. James Gaspar MD.       XR Chest 1 View [[id number]] Collected:  05/06/19 0939     Updated:  05/06/19 1601    Narrative:       EXAMINATION: XR CHEST 1 VW-      INDICATION: Postop; I31.3-Pericardial effusion (noninflammatory);  R60.0-Localized edema; I48.91-Unspecified atrial fibrillation;  I50.23-Acute on chronic systolic (congestive) heart failure; I51.9-Heart  disease, unspecified; I25.119-Atherosclerotic heart disease of native  coronary artery with unspecified angina pectoris; I30.9-Acute  pericarditis, unspecified.      COMPARISON: 04/30/2019.     FINDINGS: Portable chest reveals heart to be borderline enlarged with a  chest tube identified on the left. No pneumothorax. Increased markings  seen within the right mid and lower lung field. Degenerative change  is  seen within the spine. Pulmonary vascularity is within normal limits.             Impression:       Chest tube on the left with no definite pneumothorax.  Minimal increased markings in the right midlung.     D:  05/06/2019  E:  05/06/2019     This report was finalized on 5/6/2019 3:58 PM by Dr. Helena Ortega MD.             EKG: Sinus rhythm    ECHO: Depressed EF    Tele:      Assessment   Assessment/Plan: #1 heart failure which was multifactorial but specifically related to pericardial effusion.  He status post pericardial window.  His heart failure is improved.  2 severe CAD-  upon discharge will need outpatient follow-up looking for any symptoms of ischemic heart disease.  Right now medical management is recommended  3 MAT secondary to pericardial effusion and lung disease has resolved.  We will treat with amiodarone for 1 month and then discontinue.  4 severe COPD-on oxygen now is to follow-up with pulmonary  Review echo reveals EF around 30% so patient will need LifeVest at discharge  He will need to be on diuretic for edema    Patient can be discharged after LifeVest follow-up in 4 weeks with [facility]  Piyush Avitia MD  05/09/19  8:27 AM

## 2019-05-09 NOTE — PLAN OF CARE
Problem: Patient Care Overview  Goal: Plan of Care Review  Outcome: Outcome(s) achieved Date Met: 05/09/19 05/09/19 0820   Coping/Psychosocial   Plan of Care Reviewed With patient;spouse   Plan of Care Review   Progress improving   OTHER   Outcome Summary VSS; Pt presents at fxl baseline/independent with ADLs, fxl mobility for ADLs. O2 sats 90% on 2L O2 after ambulation. No AE/DME needs identified. No further skilled acute OT services indicated at this time. Recommend home with assist at discharge.

## 2019-05-09 NOTE — PLAN OF CARE
Problem: Patient Care Overview  Goal: Plan of Care Review  Outcome: Outcome(s) achieved Date Met: 05/09/19 05/09/19 1410   Coping/Psychosocial   Plan of Care Reviewed With patient;spouse   Plan of Care Review   Progress improving   OTHER   Outcome Summary Presents s/p pericard. window via L thoracot.for Pleur.Effus, decr. strength/endurance, & impaired funct mobil; able to amb 350 ft w/ Supv. on 2 L o2 & 2 steps w/ R rail, + ther ex all 4 extrem (warmup/cooldowns per HEP); noted desat to 83% init. on RA,then placed on 2L & desat to 85% on stairs & 86% in rivera;recovered to 90% after 2 min.sit.rest; pt met mobil. goals & is indep in HEP (Supv of spouse); will d/c home w/ assist later today after fitted w/ life vest

## 2019-05-09 NOTE — PROGRESS NOTES
Francesco Arroyo  [id number]  1945    POD#  S/P    CC: I am ready to go home    Subjective:  73-year-old  male now2 days postoperative left anterior thoracotomy with pericardial window.  The patient is done well and is currently asymptomatic.  He denies shortness of breath he denies chest pain.  He is anxious to be discharged.      Atrial fibrillation with RVR (CMS/HCC)    History of esophageal cancer    Type 2 diabetes mellitus (CMS/[facility])    COPD (chronic obstructive pulmonary disease), Severe physiology, FEV1 0.9L (34%)    GERD without esophagitis    Elevated lactic acid level    Acute CHF (CMS/HCC)    Hyponatremia    Bilateral pleural effusion    Pericardial effusion (noninflammatory)    Acute on chronic systolic CHF (congestive heart failure) (CMS/[facility])    Left ventricular dysfunction    Coronary artery disease involving native heart with angina pectoris (CMS/[facility])      Objective:    Vital Signs:  Temp:  [97.6 °F (36.4 °C)-98.1 °F (36.7 °C)] 98 °F (36.7 °C)  Heart Rate:  [71-84] 79  Resp:  [16-20] 16  BP: (119-131)/(62-90) 122/75    Physical Exam:   General Appearance: alert, appears stated age    Lungs: clear to auscultation    Heart: regular rhythm & normal rate, normal S1, S2,no murmur    Skin:warm and dry        Results from last 7 days   Lab Units 05/07/19  0228   WBC 10*3/mm3 12.12*   HEMOGLOBIN g/dL 11.6*   HEMATOCRIT % 35.5*   PLATELETS 10*3/mm3 266     Results from last 7 days   Lab Units 05/08/19  0458   SODIUM mmol/L 130*   POTASSIUM mmol/L 4.5   CHLORIDE mmol/L 93*   CO2 mmol/L 25.0   BUN mg/dL 16   CREATININE mg/dL 0.95   GLUCOSE mg/dL 124*   CALCIUM mg/dL 9.1       Imaging Results (last 24 hours)     ** No results found for the last 24 hours. **           Diagnosis:  Cardial effusion with early tamponade  Ischemic cardiomyopathy        Plan: Stable post pericardial window course.  Patient can be discharged at any point in time from CTS standpoint.  I have reviewed, verified, and confirmed  the above history and current status.  I have examined the patient and confirmed the above physical findings.     Zaid Pearson MD  05/09/19  7:50 AM

## 2019-05-10 ENCOUNTER — READMISSION MANAGEMENT (OUTPATIENT)
Dept: CALL CENTER | Facility: HOSPITAL | Age: 74
End: 2019-05-10

## 2019-05-10 RX ORDER — ATORVASTATIN CALCIUM 10 MG/1
10 TABLET, FILM COATED ORAL NIGHTLY
Qty: 30 TABLET | Refills: 3 | Status: SHIPPED | OUTPATIENT
Start: 2019-05-10 | End: 2019-10-02 | Stop reason: SDUPTHER

## 2019-05-10 RX ORDER — NICOTINE 21 MG/24HR
1 PATCH, TRANSDERMAL 24 HOURS TRANSDERMAL
Qty: 28 EACH | Refills: 1 | Status: SHIPPED | OUTPATIENT
Start: 2019-05-10 | End: 2019-10-14

## 2019-05-11 LAB — BACTERIA SPEC ANAEROBE CULT: NORMAL

## 2019-05-11 NOTE — OUTREACH NOTE
Prep Survey      Responses   Facility patient discharged from?  [facility]   Is patient eligible?  Yes   Discharge diagnosis  atrial fib with RVR   Does the patient have one of the following disease processes/diagnoses(primary or secondary)?  Other   Does the patient have Home health ordered?  No   Is there a DME ordered?  Yes   What DME was ordered?  [facility]   Prep survey completed?  Yes          Ginette Rodriguez RN

## 2019-05-13 ENCOUNTER — READMISSION MANAGEMENT (OUTPATIENT)
Dept: CALL CENTER | Facility: HOSPITAL | Age: 74
End: 2019-05-13

## 2019-05-13 NOTE — OUTREACH NOTE
Medical Week 1 Survey      Responses   Facility patient discharged from?  [place]   Does the patient have one of the following disease processes/diagnoses(primary or secondary)?  Other   Is there a successful TCM telephone encounter documented?  No   Week 1 attempt successful?  Yes   Call start time  1147   Call end time  1158   General alerts for this patient  LifeVest in place.    Discharge diagnosis  atrial fib with RVR   Is patient permission given to speak with other caregiver?  Yes   List who call center can speak with  wife   Person spoke with today (if not patient) and relationship  wife   Meds reviewed with patient/caregiver?  Yes   Is the patient having any side effects they believe may be caused by any medication additions or changes?  No   Does the patient have all medications ordered at discharge?  Yes   Is the patient taking all medications as directed (includes completed medication regime)?  Yes   Does the patient have a primary care provider?   Yes   Does the patient have an appointment with their PCP within 7 days of discharge?  Yes   Has the patient kept scheduled appointments due by today?  N/A   What DME was ordered?  [facility]   Has all DME been delivered?  Yes   DME comments  LifeVest in place.    Psychosocial issues?  No   Did the patient receive a copy of their discharge instructions?  Yes   Nursing interventions  Reviewed instructions with patient   What is the patient's perception of their health status since discharge?  Improving   Is the patient/caregiver able to teach back signs and symptoms related to disease process for when to call PCP?  Yes   Is the patient/caregiver able to teach back signs and symptoms related to disease process for when to call 911?  Yes   Is the patient/caregiver able to teach back the hierarchy of who to call/visit for symptoms/problems? PCP, Specialist, Home health nurse, Urgent Care, ED, 911  Yes   Week 1 call completed?  Yes          Lexus REGAN  SARAHI Win

## 2019-05-17 ENCOUNTER — HOSPITAL ENCOUNTER (OUTPATIENT)
Dept: CARDIOLOGY | Facility: HOSPITAL | Age: 74
Discharge: HOME | End: 2019-05-17
Admitting: NURSE PRACTITIONER

## 2019-05-17 ENCOUNTER — LAB (OUTPATIENT)
Dept: LAB | Facility: HOSPITAL | Age: 74
End: 2019-05-17

## 2019-05-17 ENCOUNTER — OFFICE VISIT (OUTPATIENT)
Dept: CARDIOLOGY | Facility: HOSPITAL | Age: 74
End: 2019-05-17

## 2019-05-17 VITALS
WEIGHT: 149.13 LBS | HEIGHT: 66 IN | OXYGEN SATURATION: 95 % | BODY MASS INDEX: 23.97 KG/M2 | DIASTOLIC BLOOD PRESSURE: 72 MMHG | TEMPERATURE: 97.2 F | RESPIRATION RATE: 18 BRPM | HEART RATE: 75 BPM | SYSTOLIC BLOOD PRESSURE: 139 MMHG

## 2019-05-17 DIAGNOSIS — E87.1 HYPONATREMIA: ICD-10-CM

## 2019-05-17 DIAGNOSIS — I50.23 ACUTE ON CHRONIC SYSTOLIC CHF (CONGESTIVE HEART FAILURE) (HCC): Primary | ICD-10-CM

## 2019-05-17 DIAGNOSIS — E87.5 HYPERKALEMIA: ICD-10-CM

## 2019-05-17 DIAGNOSIS — L89.329 PRESSURE INJURY OF SKIN OF LEFT BUTTOCK, UNSPECIFIED INJURY STAGE: ICD-10-CM

## 2019-05-17 DIAGNOSIS — I47.1 ATRIAL TACHYCARDIA (HCC): ICD-10-CM

## 2019-05-17 DIAGNOSIS — I31.39 PERICARDIAL EFFUSION (NONINFLAMMATORY): ICD-10-CM

## 2019-05-17 DIAGNOSIS — I25.119 CORONARY ARTERY DISEASE INVOLVING NATIVE CORONARY ARTERY OF NATIVE HEART WITH ANGINA PECTORIS (HCC): ICD-10-CM

## 2019-05-17 LAB
ANION GAP SERPL CALCULATED.3IONS-SCNC: 12.1 MMOL/L
BUN BLD-MCNC: 10 MG/DL (ref 8–23)
BUN/CREAT SERPL: 9.5 (ref 7–25)
CALCIUM SPEC-SCNC: 9.2 MG/DL (ref 8.6–10.5)
CHLORIDE SERPL-SCNC: 95 MMOL/L (ref 98–107)
CO2 SERPL-SCNC: 27.9 MMOL/L (ref 22–29)
CREAT BLD-MCNC: 1.05 MG/DL (ref 0.76–1.27)
GFR SERPL CREATININE-BSD FRML MDRD: 69 ML/MIN/1.73
GLUCOSE BLD-MCNC: 116 MG/DL (ref 65–99)
POTASSIUM BLD-SCNC: 3.5 MMOL/L (ref 3.5–5.2)
SODIUM BLD-SCNC: 135 MMOL/L (ref 136–145)

## 2019-05-17 PROCEDURE — 93005 ELECTROCARDIOGRAM TRACING: CPT | Performed by: NURSE PRACTITIONER

## 2019-05-17 PROCEDURE — 36415 COLL VENOUS BLD VENIPUNCTURE: CPT

## 2019-05-17 PROCEDURE — 93010 ELECTROCARDIOGRAM REPORT: CPT | Performed by: INTERNAL MEDICINE

## 2019-05-17 PROCEDURE — 80048 BASIC METABOLIC PNL TOTAL CA: CPT

## 2019-05-17 PROCEDURE — 99214 OFFICE O/P EST MOD 30 MIN: CPT | Performed by: NURSE PRACTITIONER

## 2019-05-17 RX ORDER — METOPROLOL SUCCINATE 25 MG/1
25 TABLET, EXTENDED RELEASE ORAL DAILY
Qty: 30 TABLET | Refills: 3 | Status: SHIPPED | OUTPATIENT
Start: 2019-05-17 | End: 2019-09-19 | Stop reason: SDUPTHER

## 2019-05-17 RX ORDER — AMIODARONE HYDROCHLORIDE 200 MG/1
200 TABLET ORAL DAILY
Qty: 30 TABLET | Refills: 0 | Status: SHIPPED | OUTPATIENT
Start: 2019-05-23 | End: 2019-08-16

## 2019-05-17 NOTE — PROGRESS NOTES
[facility]  [facility]      Encounter Date:05/17/2019     Francesco Arroyo  [address] [place] [place] [id number]  [unfilled]    1945    Alex Velez DO    Francesco Arroyo is a 73 y.o. male.      Subjective:     Chief Complaint:  Congestive Heart Failure (s/p hospital visit)       HPI     New to [facility].   73-year-old male with a history of esophageal cancer status post radiation, chemotherapy, resection in 2008.  History of stage IV COPD, systolic heart failure, CAD.  Patient was admitted with pericardial effusion status post pericardial window on 5/6/2019 with Dr. Pearson.  Patient's EF was 30% and patient was discharged on LifeVest.  Patient thought to have atrial fibrillation but after further evaluation felt possibly MAT.  Currently on amiodarone.    Pt denies CP, pressure, dyspnea, dizziness, syncope.  Mild edema, but improved from hospital stay.  Sleeping well at night. No fevers, chills. No shock from life vest.  Compliant with meds.  NO concerns.    Patient Active Problem List    Diagnosis Date Noted   • Atrial fibrillation with RVR (CMS/[facility]) 04/30/2019   • History of esophageal cancer 04/30/2019   • Type 2 diabetes mellitus (CMS/[facility]) 04/30/2019   • COPD (chronic obstructive pulmonary disease), Severe physiology, FEV1 0.9L (34%) 04/30/2019   • GERD without esophagitis 04/30/2019   • Elevated lactic acid level 04/30/2019   • Acute CHF (CMS/[facility]) 04/30/2019   • Hyponatremia 04/30/2019   • Bilateral pleural effusion 04/30/2019   • Pericardial effusion (noninflammatory) 04/30/2019     Note Last Updated: 5/2/2019     Added automatically from request for surgery [id number]     • Acute on chronic systolic CHF (congestive heart failure) (CMS/[facility]) 04/30/2019     Note Last Updated: 5/2/2019     Added automatically from request for surgery [id number]     • Left ventricular dysfunction 04/30/2019     Note Last Updated: 5/2/2019     Added automatically from request for surgery  [id number]     • Coronary artery disease involving native heart with angina pectoris (CMS/[facility]) 04/30/2019     Note Last Updated: 5/2/2019     Added automatically from request for surgery [id number]     • Esophageal cancer (CMS/[facility]) 11/28/2016     Note Last Updated: 11/28/2016                Past Surgical History:   Procedure Laterality Date   • CARDIAC CATHETERIZATION N/A 5/2/2019    Procedure: Left Heart Cath;  Surgeon: Jayme Castano MD;  Location:  [facility];  Service: Cardiovascular   • ESOPHAGUS SURGERY     • OTHER SURGICAL HISTORY  03/31/2008    Resection of esophageal cancer   • PERICARDIAL WINDOW N/A 5/6/2019    Procedure: PERICARDIAL WINDOW, THORACOTOMY APPROACH LEFT;  Surgeon: Zaid Pearson MD;  Location:  [facility] OR;  Service: Cardiothoracic       No Known Allergies      Current Outpatient Medications:   •  albuterol (PROVENTIL) (2.5 MG/3ML) 0.083% nebulizer solution, USE CONTENTS OF 1 VIAL VIA NEBULIZER EVERY 6 HOURS, Disp: , Rfl: 0  •  [START ON 5/23/2019] amiodarone (PACERONE) 200 MG tablet, Take 1 tablet by mouth Daily., Disp: 30 tablet, Rfl: 0  •  aspirin 81 MG EC tablet, Take 81 mg by mouth Daily., Disp: , Rfl:   •  atorvastatin (LIPITOR) 10 MG tablet, Take 1 tablet by mouth Every Night., Disp: 30 tablet, Rfl: 3  •  esomeprazole (nexIUM) 40 MG capsule, TAKE ONE CAPSULE BY MOUTH EVERY DAY, Disp: , Rfl: 3  •  furosemide (LASIX) 20 MG tablet, Take 20 mg by mouth Daily., Disp: , Rfl:   •  glyBURIDE-metFORMIN (GLUCOVANCE) 5-500 MG per tablet, Take 1 tablet by mouth 2 (Two) Times a Day With Meals., Disp: , Rfl:   •  HYDROcodone-acetaminophen (NORCO) 5-325 MG per tablet, Take 1 tablet by mouth Every 8 (Eight) Hours As Needed., Disp: , Rfl:   •  metoprolol tartrate (LOPRESSOR) 25 MG tablet, Take 0.5 tablets by mouth Every 12 (Twelve) Hours., Disp: 30 tablet, Rfl: 0  •  nicotine (NICODERM CQ) 21 MG/24HR patch, Place 1 patch on the skin as directed by provider Daily., Disp: 28 each, Rfl: 1  •   "trypsin-balsam-castor oil (XENADERM) ointment, Apply  topically to the appropriate area as directed 2 (Two) Times a Day As Needed for Wound Care., Disp: 60 g, Rfl: 1    The following portions of the patient's history were reviewed and updated today during office visit as appropriate: allergies, current medications, past family history, past medical history, past social history, past surgical history and problem list.    Review of Systems   Cardiovascular: Positive for leg swelling.   All other systems reviewed and are negative.      Objective:     Vitals:    05/17/19 1245 05/17/19 1246 05/17/19 1247   BP: 127/96 138/65 139/72   BP Location: Right arm Left arm Left arm   Patient Position: Sitting Sitting Sitting   Cuff Size: Adult Adult Adult   Pulse: 75 75    Resp: 18     Temp: 97.2 °F (36.2 °C)     TempSrc: Temporal     SpO2: 99% 95%    Weight: 67.6 kg (149 lb 2 oz)     Height: 167.6 cm (66\")           Physical Exam   Constitutional: He is oriented to person, place, and time. He appears well-developed and well-nourished. No distress.   HENT:   Head: Normocephalic and atraumatic.   Mouth/Throat: Oropharynx is clear and moist.   Eyes: Conjunctivae are normal. Pupils are equal, round, and reactive to light. No scleral icterus.   Neck: No hepatojugular reflux and no JVD present. Carotid bruit is not present. No tracheal deviation present. No thyromegaly present.   Cardiovascular: Normal rate, regular rhythm, normal heart sounds and intact distal pulses. Exam reveals no friction rub.   No murmur heard.  Pulmonary/Chest: Effort normal. He has decreased breath sounds in the left lower field. He has rales in the left lower field.   Abdominal: Soft. Bowel sounds are normal. He exhibits no distension. There is no tenderness.   Musculoskeletal: He exhibits edema (1+ bilateral lower extremity edema (ankle and pedal edema)).   Lymphadenopathy:     He has no cervical adenopathy.   Neurological: He is alert and oriented to " person, place, and time.   Skin: Skin is warm, dry and intact. No rash noted. No cyanosis or erythema. No pallor.   Psychiatric: He has a normal mood and affect. His behavior is normal. Thought content normal.   Vitals reviewed.      Lab and Diagnostic Review:  Lab Results   Component Value Date    WBC 9.70 05/09/2019    HGB 12.3 (L) 05/09/2019    HCT 38.5 05/09/2019    MCV 83.3 05/09/2019     05/09/2019     Lab Results   Component Value Date    GLUCOSE 213 (H) 05/09/2019    BUN 12 05/09/2019    CREATININE 1.07 05/09/2019    EGFRIFNONA 68 05/09/2019    BCR 11.2 05/09/2019    K 5.5 (H) 05/09/2019    CO2 26.0 05/09/2019    CALCIUM 8.9 05/09/2019    ALBUMIN 3.00 (L) 05/04/2019    AST 9 05/04/2019    ALT 7 05/04/2019       Assessment and Plan:         1. Acute on chronic systolic CHF (congestive heart failure) (CMS/[facility])  Insetting of pericardial effusion    EF 30%, wearing life vest  NYHA III  Improved s/s  Lasix  Change metoprolol succinate 25 mg daily (d/c metoprolol tartrate).  To add ace/arb/arni if BP remains stable    Heart failure education discussed: What is heart failure, causes, signs and symptoms, medication management, daily weight monitoring, low-sodium diet of less than 2 g per day, daily exercise, role the heart failure center.  Discussed indications for LifeVest, repeating echocardiogram, ICD indications and implantation if needed. (25 min face to face with 50% of time spent providing education, reviewing diagnostics with patient and discussing POC)      2. Coronary artery disease involving native coronary artery of native heart with angina pectoris (CMS/[facility])  Asa  No statin.  F/u and discuss with Dr. Castano    3. Pericardial effusion (noninflammatory)  S/p window  stable    4. Atrial tachycardia (CMS/[facility])    - ECG 12 Lead; sr 73 bpm  - amiodarone (PACERONE) 200 MG tablet; Take 1 tablet by mouth Daily.  Dispense: 30 tablet; Refill: 0    5. Pressure injury of skin of left buttock, unspecified  injury stage    - trypsin-balsam-castor oil (XENADERM) ointment; Apply  topically to the appropriate area as directed 2 (Two) Times a Day As Needed for Wound Care.  Dispense: 60 g; Refill: 1    6. Hyponatremia  Discuss fluid restriction of <1500ml  - Basic Metabolic Panel; Future    7. Hyperkalemia    - Basic Metabolic Panel; Future    F/u with [facility] as scheduled (4 weeks), f/u [facility] 3 months or sooner if needed.   *Please note that portions of this note were completed with a voice recognition program. Efforts were made to edit the dictations, but occasionally words are mistranscribed.

## 2019-05-20 ENCOUNTER — READMISSION MANAGEMENT (OUTPATIENT)
Dept: CALL CENTER | Facility: HOSPITAL | Age: 74
End: 2019-05-20

## 2019-05-20 NOTE — OUTREACH NOTE
Medical Week 2 Survey      Responses   Facility patient discharged from?  [place]   Does the patient have one of the following disease processes/diagnoses(primary or secondary)?  Other   Week 2 attempt successful?  Yes   Call start time  1345   Discharge diagnosis  atrial fib with RVR   Call end time  1348   Is patient permission given to speak with other caregiver?  Yes   List who call center can speak with  Kasandra- Wife,  Son   Person spoke with today (if not patient) and relationship  Kasandra- Wife   Meds reviewed with patient/caregiver?  Yes   Is the patient having any side effects they believe may be caused by any medication additions or changes?  No   Does the patient have all medications ordered at discharge?  Yes   Is the patient taking all medications as directed (includes completed medication regime)?  Yes   Does the patient have a primary care provider?   Yes   Does the patient have an appointment with their PCP within 7 days of discharge?  Yes   Has the patient kept scheduled appointments due by today?  N/A   What DME was ordered?  [facility]   Has all DME been delivered?  Yes   DME comments  LifeVest in place.    Psychosocial issues?  No   Did the patient receive a copy of their discharge instructions?  Yes   Nursing interventions  Reviewed instructions with patient, Educated on MyChart   What is the patient's perception of their health status since discharge?  Improving   Is the patient/caregiver able to teach back signs and symptoms related to disease process for when to call PCP?  Yes   Is the patient/caregiver able to teach back signs and symptoms related to disease process for when to call 911?  Yes   Is the patient/caregiver able to teach back the hierarchy of who to call/visit for symptoms/problems? PCP, Specialist, Home health nurse, Urgent Care, ED, 911  Yes   Week 2 Call Completed?  Yes          Mary Ramos RN

## 2019-05-22 ENCOUNTER — TELEPHONE (OUTPATIENT)
Dept: CARDIOLOGY | Facility: HOSPITAL | Age: 74
End: 2019-05-22

## 2019-05-22 NOTE — TELEPHONE ENCOUNTER
Called and spoke to patient.  He would like to keep O2 until f/u with CT sx.        ----- Message from Aislinn Bell MA sent at 5/21/2019  3:44 PM EDT -----  Dannielle,  Called [facility] in [place] and they need an order for patient to d/c use of oxygen before they will pickup. Can you get an order?    Fax- [phone]  ----- Message -----  From: Cristina Albrecht APRN  Sent: 5/21/2019   3:36 PM  To: Aislinn Bell MA    If patient is not using he may return.  ----- Message -----  From: Aislinn Bell MA  Sent: 5/20/2019   4:54 PM  To: NIMCO Kelly    Patient's wife called and states that they are wanting to schedule pickup for his oxygen. Patient states that he was told to wear the oxygen as needed. Patient states that he does not feel he needs the oxygen. Is it okay with you to schedule pickup?    [phone]

## 2019-05-30 ENCOUNTER — READMISSION MANAGEMENT (OUTPATIENT)
Dept: CALL CENTER | Facility: HOSPITAL | Age: 74
End: 2019-05-30

## 2019-05-30 NOTE — OUTREACH NOTE
Medical Week 3 Survey      Responses   Facility patient discharged from?  [place]   Does the patient have one of the following disease processes/diagnoses(primary or secondary)?  Other   Week 3 attempt successful?  Yes   Call start time  1212   Call end time  1218   General alerts for this patient  LifeVest in place.    Discharge diagnosis  atrial fib with RVR   Is patient permission given to speak with other caregiver?  Yes   Person spoke with today (if not patient) and relationship  Kasandra- Wife   Meds reviewed with patient/caregiver?  Yes   Is the patient having any side effects they believe may be caused by any medication additions or changes?  No   Does the patient have all medications ordered at discharge?  Yes   Is the patient taking all medications as directed (includes completed medication regime)?  Yes   Does the patient have a primary care provider?   Yes   Does the patient have an appointment with their PCP within 7 days of discharge?  Yes   Has the patient kept scheduled appointments due by today?  Yes   Has home health visited the patient within 72 hours of discharge?  N/A   What DME was ordered?  [facility]   Has all DME been delivered?  Yes   DME comments  LifeVest in place.    Psychosocial issues?  No   Did the patient receive a copy of their discharge instructions?  Yes   Nursing interventions  Reviewed instructions with patient   What is the patient's perception of their health status since discharge?  Improving   Is the patient/caregiver able to teach back signs and symptoms related to disease process for when to call PCP?  Yes   Is the patient/caregiver able to teach back signs and symptoms related to disease process for when to call 911?  Yes   Is the patient/caregiver able to teach back the hierarchy of who to call/visit for symptoms/problems? PCP, Specialist, Home health nurse, Urgent Care, ED, 911  Yes   Additional teach back comments  He doesn't care for his lifevest,he hopes he  doesn't need an ICD.  Encouraged writing down questions for his MD appts.   Week 3 Call Completed?  Yes          Yvette Ontiveros RN

## 2019-06-03 LAB — FUNGUS WND CULT: NORMAL

## 2019-06-06 ENCOUNTER — OFFICE VISIT (OUTPATIENT)
Dept: CARDIAC SURGERY | Facility: CLINIC | Age: 74
End: 2019-06-06

## 2019-06-06 VITALS
HEART RATE: 72 BPM | TEMPERATURE: 97.7 F | WEIGHT: 148.6 LBS | SYSTOLIC BLOOD PRESSURE: 124 MMHG | DIASTOLIC BLOOD PRESSURE: 79 MMHG | HEIGHT: 66 IN | BODY MASS INDEX: 23.88 KG/M2 | OXYGEN SATURATION: 97 %

## 2019-06-06 DIAGNOSIS — I48.91 ATRIAL FIBRILLATION WITH RVR (HCC): Primary | ICD-10-CM

## 2019-06-06 DIAGNOSIS — I50.23 ACUTE ON CHRONIC SYSTOLIC CHF (CONGESTIVE HEART FAILURE) (HCC): ICD-10-CM

## 2019-06-06 DIAGNOSIS — C15.5 MALIGNANT NEOPLASM OF LOWER THIRD OF ESOPHAGUS (HCC): ICD-10-CM

## 2019-06-06 DIAGNOSIS — I31.39 PERICARDIAL EFFUSION (NONINFLAMMATORY): ICD-10-CM

## 2019-06-06 DIAGNOSIS — E11.8 TYPE 2 DIABETES MELLITUS WITH COMPLICATION, UNSPECIFIED WHETHER LONG TERM INSULIN USE: ICD-10-CM

## 2019-06-06 DIAGNOSIS — I25.119 CORONARY ARTERY DISEASE INVOLVING NATIVE CORONARY ARTERY OF NATIVE HEART WITH ANGINA PECTORIS (HCC): ICD-10-CM

## 2019-06-06 DIAGNOSIS — J44.9 CHRONIC OBSTRUCTIVE PULMONARY DISEASE, UNSPECIFIED COPD TYPE (HCC): ICD-10-CM

## 2019-06-06 PROCEDURE — 99024 POSTOP FOLLOW-UP VISIT: CPT | Performed by: THORACIC SURGERY (CARDIOTHORACIC VASCULAR SURGERY)

## 2019-06-06 NOTE — PROGRESS NOTES
06/06/2019  Patient Information  Francesco Arroyo                                                                                          [address]   [facility] [address]   1945  'PCP/Referring Physician'  Alex Velez,   [phone]  No ref. provider found    Chief Complaint   Patient presents with   • Post-op Follow-up     Hospital follow-up s/p left anterior thoracotomy with pericardial window 5/16/19 for pericardial effusion     CC: I feel great thank you for saving my life  History of Present Illness: 73-year-old  male now 1 month postoperative left anterior thoracotomy for pericardial effusion and tamponade.  The etiology of the effusion is not clear it is likely related to previous viral infection.  The patient does have significant coronary artery disease she is currently being treated medically.  He denies anginal quality chest pain.  He has shortness of breath with activity but has a history of COPD.  He has a significant past history of esophageal carcinoma status post resection and reconstruction via the abdomen and right chest.  Is now several years post treatment and is considered disease-free.  He has had mediastinal radiation.      Patient Active Problem List   Diagnosis   • Esophageal cancer (CMS/HCC)   • Atrial fibrillation with RVR (CMS/HCC)   • History of esophageal cancer   • Type 2 diabetes mellitus (CMS/HCC)   • COPD (chronic obstructive pulmonary disease), Severe physiology, FEV1 0.9L (34%)   • GERD without esophagitis   • Elevated lactic acid level   • Acute CHF (CMS/HCC)   • Hyponatremia   • Bilateral pleural effusion   • Pericardial effusion (noninflammatory)   • Acute on chronic systolic CHF (congestive heart failure) (CMS/HCC)   • Left ventricular dysfunction   • Coronary artery disease involving native heart with angina pectoris (CMS/HCC)     Past Medical History:   Diagnosis Date   • Esophageal cancer (CMS/HCC) 11/28/2016     Past Surgical History:    Procedure Laterality Date   • CARDIAC CATHETERIZATION N/A 5/2/2019    Procedure: Left Heart Cath;  Surgeon: Jayme Castano MD;  Location:  [facility];  Service: Cardiovascular   • ESOPHAGUS SURGERY     • OTHER SURGICAL HISTORY  03/31/2008    Resection of esophageal cancer   • PERICARDIAL WINDOW N/A 5/6/2019    Procedure: PERICARDIAL WINDOW, THORACOTOMY APPROACH LEFT;  Surgeon: Zaid Pearson MD;  Location:  [facility] OR;  Service: Cardiothoracic       Current Outpatient Medications:   •  albuterol (PROVENTIL) (2.5 MG/3ML) 0.083% nebulizer solution, USE CONTENTS OF 1 VIAL VIA NEBULIZER EVERY 6 HOURS, Disp: , Rfl: 0  •  amiodarone (PACERONE) 200 MG tablet, Take 1 tablet by mouth Daily., Disp: 30 tablet, Rfl: 0  •  aspirin 81 MG EC tablet, Take 81 mg by mouth Daily., Disp: , Rfl:   •  atorvastatin (LIPITOR) 10 MG tablet, Take 1 tablet by mouth Every Night., Disp: 30 tablet, Rfl: 3  •  esomeprazole (nexIUM) 40 MG capsule, TAKE ONE CAPSULE BY MOUTH EVERY DAY, Disp: , Rfl: 3  •  furosemide (LASIX) 40 MG tablet, Take 40 mg by mouth Daily., Disp: , Rfl:   •  glyBURIDE-metFORMIN (GLUCOVANCE) 5-500 MG per tablet, Take 1 tablet by mouth 2 (Two) Times a Day With Meals., Disp: , Rfl:   •  HYDROcodone-acetaminophen (NORCO) 5-325 MG per tablet, Take 1 tablet by mouth Every 8 (Eight) Hours As Needed., Disp: , Rfl:   •  metoprolol succinate XL (TOPROL-XL) 25 MG 24 hr tablet, Take 1 tablet by mouth Daily., Disp: 30 tablet, Rfl: 3  •  nicotine (NICODERM CQ) 21 MG/24HR patch, Place 1 patch on the skin as directed by provider Daily., Disp: 28 each, Rfl: 1  •  trypsin-balsam-castor oil (XENADERM) ointment, Apply  topically to the appropriate area as directed 2 (Two) Times a Day As Needed for Wound Care., Disp: 60 g, Rfl: 1  No Known Allergies  Social History     Socioeconomic History   • Marital status:      Spouse name: Not on file   • Number of children: 1   • Years of education: Not on file   • Highest  education level: Not on file   Occupational History   • Occupation: farmer     Employer: RETIRED   Tobacco Use   • Smoking status: Former Smoker     Packs/day: 1.00     Years: 60.00     Pack years: 60.00     Types: Cigarettes     Last attempt to quit: 4/3/2019     Years since quittin.1   • Smokeless tobacco: Never Used   Substance and Sexual Activity   • Alcohol use: Yes     Frequency: Never     Comment: occ   • Drug use: No   • Sexual activity: Defer   Social History Narrative    Caffeine: 3 cups daily     Lives in [facility] with spouse and son     Family History   Problem Relation Age of Onset   • Stroke Mother    • Cancer Father      Review of Systems   Constitution: Negative for chills, fever, malaise/fatigue, night sweats and weight loss.   HENT: Negative for hearing loss, odynophagia and sore throat.    Eyes: Negative for blurred vision, vision loss in left eye, vision loss in right eye and visual disturbance.   Cardiovascular: Positive for irregular heartbeat. Negative for chest pain, dyspnea on exertion, leg swelling, orthopnea and palpitations.   Respiratory: Positive for cough and shortness of breath. Negative for hemoptysis and wheezing.    Endocrine: Negative for cold intolerance, heat intolerance, polydipsia, polyphagia and polyuria.   Hematologic/Lymphatic: Does not bruise/bleed easily.   Skin: Negative for itching and rash.   Musculoskeletal: Negative for joint pain, joint swelling and myalgias.   Gastrointestinal: Negative for abdominal pain, constipation, diarrhea, hematemesis, hematochezia, melena, nausea and vomiting.   Genitourinary: Negative for dysuria, frequency and hematuria.   Neurological: Negative for focal weakness, headaches, numbness and seizures.   Psychiatric/Behavioral: Negative for altered mental status and suicidal ideas. The patient is not nervous/anxious.    All other systems reviewed and are negative.    Vitals:    19 1029   BP: 124/79   BP Location: Right arm  "  Patient Position: Sitting   Pulse: 72   Temp: 97.7 °F (36.5 °C)   TempSrc: Temporal   SpO2: 97%   Weight: 67.4 kg (148 lb 9.6 oz)   Height: 167.6 cm (66\")      Physical Exam   Constitutional: He is oriented to person, place, and time. He appears well-developed and well-nourished. No distress.   HENT:   Head: Normocephalic.   Right Ear: External ear normal.   Left Ear: External ear normal.   Nose: Nose normal.   Eyes: Pupils are equal, round, and reactive to light.   Neck: Normal range of motion. Neck supple. No tracheal deviation present. No thyromegaly present.   Cardiovascular: Normal rate and normal heart sounds.   No murmur heard.  Heart sounds are clear.  S1 and S2 are normal.  No murmur.   Pulmonary/Chest: Effort normal and breath sounds normal. No respiratory distress. He has no wheezes. He has no rales. He exhibits no tenderness.   Lungs are clear to percussion and auscultation without rhonchi and without rales.   Abdominal: Soft. Bowel sounds are normal. He exhibits no distension and no mass. There is no tenderness.   Musculoskeletal: Normal range of motion. He exhibits no edema.   Left anterior thoracotomy incision healing without erythema and without drainage.   Lymphadenopathy:     He has no cervical adenopathy.   Neurological: He is alert and oriented to person, place, and time. He has normal reflexes. No cranial nerve deficit.   Skin: Skin is warm and dry. No rash noted. No erythema.   Psychiatric: He has a normal mood and affect.       Labs/Imaging: PA and lateral chest x-ray consistent with the patient's postoperative state.  Lung fields are clear.  Is increased AP diameter and the diaphragms seem flattened consistent with COPD.  The mediastinum is normal clips from previous esophageal resection noted    Assessment: 73-year-old man now status post pericardial window via a left anterior thoracotomy stable postoperative course.    Plan: Patient will follow up with cardiology as appointed.  He will " return to this clinic on a as needed basis.    Patient Active Problem List   Diagnosis   • Esophageal cancer (CMS/HCC)   • Atrial fibrillation with RVR (CMS/HCC)   • History of esophageal cancer   • Type 2 diabetes mellitus (CMS/HCC)   • COPD (chronic obstructive pulmonary disease), Severe physiology, FEV1 0.9L (34%)   • GERD without esophagitis   • Elevated lactic acid level   • Acute CHF (CMS/HCC)   • Hyponatremia   • Bilateral pleural effusion   • Pericardial effusion (noninflammatory)   • Acute on chronic systolic CHF (congestive heart failure) (CMS/HCC)   • Left ventricular dysfunction   • Coronary artery disease involving native heart with angina pectoris (CMS/HCC)       Zaid Pearson MD  CTSurgery  06/07/19   2:46 PM

## 2019-06-07 ENCOUNTER — READMISSION MANAGEMENT (OUTPATIENT)
Dept: CALL CENTER | Facility: HOSPITAL | Age: 74
End: 2019-06-07

## 2019-06-07 NOTE — OUTREACH NOTE
Medical Week 4 Survey      Responses   Facility patient discharged from?  [facility]   Does the patient have one of the following disease processes/diagnoses(primary or secondary)?  Other   Week 4 attempt successful?  No          Hannah Franks RN

## 2019-06-10 ENCOUNTER — OFFICE VISIT (OUTPATIENT)
Dept: CARDIOLOGY | Facility: CLINIC | Age: 74
End: 2019-06-10

## 2019-06-10 VITALS
BODY MASS INDEX: 23.39 KG/M2 | SYSTOLIC BLOOD PRESSURE: 136 MMHG | OXYGEN SATURATION: 95 % | DIASTOLIC BLOOD PRESSURE: 64 MMHG | HEIGHT: 67 IN | WEIGHT: 149 LBS | HEART RATE: 78 BPM

## 2019-06-10 DIAGNOSIS — I25.10 CORONARY ARTERY DISEASE INVOLVING NATIVE CORONARY ARTERY OF NATIVE HEART WITHOUT ANGINA PECTORIS: Primary | ICD-10-CM

## 2019-06-10 DIAGNOSIS — E78.5 DYSLIPIDEMIA: ICD-10-CM

## 2019-06-10 DIAGNOSIS — I47.1 MULTIFOCAL ATRIAL TACHYCARDIA (HCC): ICD-10-CM

## 2019-06-10 DIAGNOSIS — I42.8 NON-ISCHEMIC CARDIOMYOPATHY (HCC): ICD-10-CM

## 2019-06-10 PROCEDURE — 93000 ELECTROCARDIOGRAM COMPLETE: CPT | Performed by: NURSE PRACTITIONER

## 2019-06-10 PROCEDURE — 99214 OFFICE O/P EST MOD 30 MIN: CPT | Performed by: NURSE PRACTITIONER

## 2019-06-10 NOTE — PROGRESS NOTES
Subjective:     Encounter Date:06/10/2019    Primary Care Physician: Alex Velez DO      Patient ID: Francesco Arroyo is a 74 y.o. male.    Chief Complaint:Atrial Fibrillation    PROBLEM LIST:  1. Atrial fibrillation/MAT  a. Noted 2019. Amiodarone started.  2. Coronary artery disease  a. MVCAD by [facility] 2019. Poor surgical candidate due to severe COPD.  3. Pericardial effusion  a. Pericardial window 2019 Dr. Pearson  4. Esophageal cancer  5. Surgeries:  a. Pericardial window  b. Esophageal resection      No Known Allergies      Current Outpatient Medications:   •  albuterol (PROVENTIL) (2.5 MG/3ML) 0.083% nebulizer solution, USE CONTENTS OF 1 VIAL VIA NEBULIZER EVERY 6 HOURS, Disp: , Rfl: 0  •  amiodarone (PACERONE) 200 MG tablet, Take 1 tablet by mouth Daily., Disp: 30 tablet, Rfl: 0  •  aspirin 81 MG EC tablet, Take 81 mg by mouth Daily., Disp: , Rfl:   •  atorvastatin (LIPITOR) 10 MG tablet, Take 1 tablet by mouth Every Night., Disp: 30 tablet, Rfl: 3  •  esomeprazole (nexIUM) 40 MG capsule, TAKE ONE CAPSULE BY MOUTH EVERY DAY, Disp: , Rfl: 3  •  furosemide (LASIX) 40 MG tablet, Take 40 mg by mouth Daily. 0.5 TAB DAILY PRN, Disp: , Rfl:   •  glyBURIDE-metFORMIN (GLUCOVANCE) 5-500 MG per tablet, Take 1 tablet by mouth 2 (Two) Times a Day With Meals., Disp: , Rfl:   •  HYDROcodone-acetaminophen (NORCO) 5-325 MG per tablet, Take 1 tablet by mouth Every 8 (Eight) Hours As Needed., Disp: , Rfl:   •  metoprolol succinate XL (TOPROL-XL) 25 MG 24 hr tablet, Take 1 tablet by mouth Daily., Disp: 30 tablet, Rfl: 3  •  nicotine (NICODERM CQ) 21 MG/24HR patch, Place 1 patch on the skin as directed by provider Daily., Disp: 28 each, Rfl: 1        History of Present Illness    Patient returns today for follow-up of cardiomyopathy, coronary artery disease, recent pericardial effusion and atrial arrhythmias.  Since last being seen patient overall notes to be doing very well since his pericardial window.  He has been  "released to drive by CT surgery and released to an as needed follow-up basis for them.  Overall he notes to feeling quite well.  He denies any chest pain, pressure, tightness.  Denies any increasing shortness of breath.  No syncope, near-syncope, or edema.  Patient requests that he have no further procedures performed.  He complains that his LifeVest is very uncomfortable and he wishes to discontinue use.  He also notes that he has not been using oxygen at home but the company needs a discontinuation order prior to coming to  the equipment.     The following portions of the patient's history were reviewed and updated as appropriate: allergies, current medications, past family history, past medical history, past social history, past surgical history and problem list.      Social History     Tobacco Use   • Smoking status: Former Smoker     Packs/day: 1.00     Years: 60.00     Pack years: 60.00     Types: Cigarettes     Last attempt to quit: 4/3/2019     Years since quittin.1   • Smokeless tobacco: Never Used   Substance Use Topics   • Alcohol use: Yes     Frequency: Never     Comment: occ   • Drug use: No         Review of Systems   Constitution: Negative.   Cardiovascular: Negative.    Respiratory: Negative.    Hematologic/Lymphatic: Negative for bleeding problem. Does not bruise/bleed easily.   Skin: Negative for rash.   Musculoskeletal: Negative for muscle weakness and myalgias.   Gastrointestinal: Negative for heartburn, nausea and vomiting.   Neurological: Negative.           Objective:    height is 170.2 cm (67\") and weight is 67.6 kg (149 lb). His blood pressure is 136/64 and his pulse is 78. His oxygen saturation is 95%.         Physical Exam   Constitutional: He is oriented to person, place, and time. He appears well-developed and well-nourished. No distress.   Neck: No JVD present. No tracheal deviation present.   Cardiovascular: Normal rate, regular rhythm, normal heart sounds and intact distal " pulses. Exam reveals no friction rub.   No murmur heard.  Pulmonary/Chest: Effort normal. No respiratory distress.   Mild bilateral rhonchi.   Abdominal: Soft. Bowel sounds are normal. There is no tenderness.   Musculoskeletal: He exhibits edema (Trace lower extremity edema). He exhibits no deformity.   Neurological: He is alert and oriented to person, place, and time.   Skin: Skin is warm and dry.         ECG 12 Lead  Date/Time: 6/10/2019 3:07 PM  Performed by: Valentina lEam APRN  Authorized by: Valentina Elam APRN   Comparison: compared with previous ECG from 5/17/2019  Similar to previous ECG  Rhythm: sinus rhythm  Conduction: right bundle branch block    Clinical impression: abnormal EKG  Comments: Inferior T wave changes                  Assessment:   Assessment/Plan      Francesco was seen today for atrial fibrillation.    Diagnoses and all orders for this visit:    Coronary artery disease involving native coronary artery of native heart without angina pectoris.  Doing well on current medical therapy.    Multifocal atrial tachycardia (CMS/HCC), stable.    Non-ischemic cardiomyopathy (CMS/HCC), ejection fraction of 30% by last check.    Dyslipidemia, on low-dose statin therapy.      Plan:  1. Continue aspirin and statin for coronary disease.  Continue statin as well for dyslipidemia.  2. Discontinue amiodarone at this time.  Patient does have noted atrial arrhythmias but given his severe COPD we will discontinue use of this medication at this time.  If recurrent atrial arrhythmias ensue will consider other antiarrhythmic options.  3. Discussed with patient and his wife today that given his noted decreased LVEF and noted LAD disease that intervention would be warranted to help increase his EF.  Patient wishes to defer any further invasive testing or procedures.  Given that he would not like to pursue any further invasive measures at this time we will discontinue his LifeVest.  We will not pursue ICD implant  given that he will not be revascularized.  4. Start Entresto 24/26 mg twice daily today for decreased LVEF.  5. Follow-up with heart and valve center as scheduled.  6. Follow-up in our office with Dr. Castano in 4 months or sooner if needed.       Valentina RINALDI     Dictated utilizing Dragon dictation

## 2019-06-13 ENCOUNTER — PRIOR AUTHORIZATION (OUTPATIENT)
Dept: CARDIOLOGY | Facility: CLINIC | Age: 74
End: 2019-06-13

## 2019-06-13 NOTE — TELEPHONE ENCOUNTER
Francesco Arroyo (Key: [id number])  Rx #: [id number]  [facility]rest[facility] 24[facility]26MG tablets  [facility]

## 2019-06-17 LAB
FUNGUS WND CULT: NORMAL
MYCOBACTERIUM SPEC CULT: NORMAL
NIGHT BLUE STAIN TISS: NORMAL

## 2019-06-26 LAB
MYCOBACTERIUM SPEC CULT: NORMAL
NIGHT BLUE STAIN TISS: NORMAL

## 2019-07-05 DIAGNOSIS — I47.1 ATRIAL TACHYCARDIA (HCC): ICD-10-CM

## 2019-07-05 RX ORDER — AMIODARONE HYDROCHLORIDE 200 MG/1
TABLET ORAL
Qty: 30 TABLET | Refills: 0 | OUTPATIENT
Start: 2019-07-05

## 2019-07-05 NOTE — TELEPHONE ENCOUNTER
Per office visit with NP Clevinger on 6/10, discontinued Amiodarone.  Called patient to verify he is no longer taking. Will reject refill request from pharmacy.    Sherlyn Patterson, PharmD  Pharmacy Resident

## 2019-07-24 DIAGNOSIS — I47.1 ATRIAL TACHYCARDIA (HCC): ICD-10-CM

## 2019-07-25 RX ORDER — AMIODARONE HYDROCHLORIDE 200 MG/1
TABLET ORAL
Qty: 30 TABLET | Refills: 0 | OUTPATIENT
Start: 2019-07-25

## 2019-07-25 NOTE — TELEPHONE ENCOUNTER
Amiodarone discontinued 06/19. Patient confirmed he is no longer taking on 7/5/19. Called pharmacy to discontinue Rx.     Farzad Barnett PharmD  Pharmacy Resident   7/25/2019  8:29 AM

## 2019-08-16 ENCOUNTER — OFFICE VISIT (OUTPATIENT)
Dept: CARDIOLOGY | Facility: HOSPITAL | Age: 74
End: 2019-08-16

## 2019-08-16 VITALS
TEMPERATURE: 97.2 F | HEART RATE: 83 BPM | SYSTOLIC BLOOD PRESSURE: 121 MMHG | RESPIRATION RATE: 18 BRPM | BODY MASS INDEX: 24.33 KG/M2 | OXYGEN SATURATION: 96 % | WEIGHT: 155 LBS | HEIGHT: 67 IN | DIASTOLIC BLOOD PRESSURE: 69 MMHG

## 2019-08-16 DIAGNOSIS — I25.119 CORONARY ARTERY DISEASE INVOLVING NATIVE CORONARY ARTERY OF NATIVE HEART WITH ANGINA PECTORIS (HCC): ICD-10-CM

## 2019-08-16 DIAGNOSIS — I50.22 CHRONIC SYSTOLIC HEART FAILURE (HCC): ICD-10-CM

## 2019-08-16 DIAGNOSIS — I47.1 ATRIAL TACHYCARDIA (HCC): ICD-10-CM

## 2019-08-16 LAB
ANION GAP SERPL CALCULATED.3IONS-SCNC: 11.7 MMOL/L (ref 5–15)
BUN BLD-MCNC: 10 MG/DL (ref 8–23)
BUN/CREAT SERPL: 9.3 (ref 7–25)
CALCIUM SPEC-SCNC: 9.5 MG/DL (ref 8.6–10.5)
CHLORIDE SERPL-SCNC: 100 MMOL/L (ref 98–107)
CO2 SERPL-SCNC: 24.3 MMOL/L (ref 22–29)
CREAT BLD-MCNC: 1.07 MG/DL (ref 0.76–1.27)
GFR SERPL CREATININE-BSD FRML MDRD: 67 ML/MIN/1.73
GLUCOSE BLD-MCNC: 209 MG/DL (ref 65–99)
POTASSIUM BLD-SCNC: 5 MMOL/L (ref 3.5–5.2)
SODIUM BLD-SCNC: 136 MMOL/L (ref 136–145)

## 2019-08-16 PROCEDURE — 36415 COLL VENOUS BLD VENIPUNCTURE: CPT | Performed by: NURSE PRACTITIONER

## 2019-08-16 PROCEDURE — 80048 BASIC METABOLIC PNL TOTAL CA: CPT | Performed by: NURSE PRACTITIONER

## 2019-08-16 PROCEDURE — 99214 OFFICE O/P EST MOD 30 MIN: CPT | Performed by: NURSE PRACTITIONER

## 2019-08-16 RX ORDER — NAPROXEN 500 MG/1
500 TABLET ORAL AS NEEDED
Refills: 2 | COMMUNITY
Start: 2019-08-08 | End: 2019-11-26 | Stop reason: HOSPADM

## 2019-08-16 RX ORDER — GLYBURIDE 5 MG/1
5 TABLET ORAL AS NEEDED
Refills: 3 | COMMUNITY
Start: 2019-07-24 | End: 2021-01-01

## 2019-08-16 RX ORDER — METFORMIN HYDROCHLORIDE 500 MG/1
500 TABLET, EXTENDED RELEASE ORAL EVERY 12 HOURS
Refills: 1 | COMMUNITY
Start: 2019-07-24

## 2019-08-16 NOTE — PROGRESS NOTES
[facility]  Heart and Valve Center      Encounter Date:08/16/2019     Francesco Arroyo  [address]  [facility] [address]  [unfilled]    1945    Alex Velez DO    Francesco Arroyo is a 74 y.o. male.      Subjective:     Chief Complaint:  Follow-up (SHF, CAD)       HPI     73-year-old male with history of esophageal cancer status post radiation and chemotherapy with resection 2008.  History of stage IV COPD, systolic heart failure, CAD.  May 2019 EF 30%.  History of atrial tachycardia.  Amiodarone discontinued at last office visit with [facility] on 6/10/2019.  There was discussion due to LAD disease and low EF about possible interventions.  Patient deferred invasive testing or procedures.  LifeVest was discontinued.  Declined implantation of ICD.  Entresto was also started 24/26 mg twice a day.    Pt reports he is doing well.  Denies CP, pressure, palpitation, dizziness, edema, syncope.  No concerns.     Patient Active Problem List    Diagnosis Date Noted   • Atrial fibrillation with RVR (CMS/[facility]) 04/30/2019   • History of esophageal cancer 04/30/2019   • Type 2 diabetes mellitus (CMS/[facility]) 04/30/2019   • COPD (chronic obstructive pulmonary disease), Severe physiology, FEV1 0.9L (34%) 04/30/2019   • GERD without esophagitis 04/30/2019   • Elevated lactic acid level 04/30/2019   • Acute CHF (CMS/[facility]) 04/30/2019   • Hyponatremia 04/30/2019   • Bilateral pleural effusion 04/30/2019   • Pericardial effusion (noninflammatory) 04/30/2019     Note Last Updated: 5/2/2019     Added automatically from request for surgery [id number]     • Acute on chronic systolic CHF (congestive heart failure) (CMS/[facility]) 04/30/2019     Note Last Updated: 5/2/2019     Added automatically from request for surgery [id number]     • Left ventricular dysfunction 04/30/2019     Note Last Updated: 5/2/2019     Added automatically from request for surgery [id number]     • Coronary artery disease involving native heart with angina  pectoris (CMS/[facility]) 04/30/2019     Note Last Updated: 5/2/2019     Added automatically from request for surgery [id number]     • Esophageal cancer (CMS/[facility]) 11/28/2016     Note Last Updated: 11/28/2016                Past Surgical History:   Procedure Laterality Date   • CARDIAC CATHETERIZATION N/A 5/2/2019    Procedure: Left Heart Cath;  Surgeon: aJyme Castano MD;  Location:  [facility];  Service: Cardiovascular   • ESOPHAGUS SURGERY     • OTHER SURGICAL HISTORY  03/31/2008    Resection of esophageal cancer   • PERICARDIAL WINDOW N/A 5/6/2019    Procedure: PERICARDIAL WINDOW, THORACOTOMY APPROACH LEFT;  Surgeon: Zaid Pearson MD;  Location:  [facility] OR;  Service: Cardiothoracic       No Known Allergies      Current Outpatient Medications:   •  albuterol (PROVENTIL) (2.5 MG/3ML) 0.083% nebulizer solution, USE CONTENTS OF 1 VIAL VIA NEBULIZER EVERY 6 HOURS, Disp: , Rfl: 0  •  aspirin 81 MG EC tablet, Take 81 mg by mouth Daily., Disp: , Rfl:   •  atorvastatin (LIPITOR) 10 MG tablet, Take 1 tablet by mouth Every Night., Disp: 30 tablet, Rfl: 3  •  esomeprazole (nexIUM) 40 MG capsule, TAKE ONE CAPSULE BY MOUTH EVERY DAY, Disp: , Rfl: 3  •  furosemide (LASIX) 40 MG tablet, Take 40 mg by mouth Daily. 0.5 TAB DAILY PRN, Disp: , Rfl:   •  glyBURIDE (DIAbeta) 5 MG tablet, Take 5 mg by mouth 3 (Three) Times a Day. Take 2 tablets po in the am and 1 tab in the pm, Disp: , Rfl: 3  •  HYDROcodone-acetaminophen (NORCO) 5-325 MG per tablet, Take 1 tablet by mouth Every 8 (Eight) Hours As Needed., Disp: , Rfl:   •  metFORMIN ER (GLUCOPHAGE-XR) 500 MG 24 hr tablet, Take 500 mg by mouth Every 12 (Twelve) Hours., Disp: , Rfl: 1  •  metoprolol succinate XL (TOPROL-XL) 25 MG 24 hr tablet, Take 1 tablet by mouth Daily., Disp: 30 tablet, Rfl: 3  •  naproxen (NAPROSYN) 500 MG tablet, Take 500 mg by mouth As Needed., Disp: , Rfl: 2  •  nicotine (NICODERM CQ) 21 MG/24HR patch, Place 1 patch on the skin as directed by provider  "Daily., Disp: 28 each, Rfl: 1  •  sacubitril-valsartan (ENTRESTO) 24-26 MG tablet, Take 1 tablet by mouth 2 (Two) Times a Day., Disp: 60 tablet, Rfl: 11    The following portions of the patient's history were reviewed and updated today during office visit as appropriate: allergies, current medications, past family history, past medical history, past social history, past surgical history and problem list.    Review of Systems   Constitution: Positive for malaise/fatigue.   All other systems reviewed and are negative.      Objective:     Vitals:    08/16/19 1323   BP: 121/69   BP Location: Right arm   Patient Position: Sitting   Cuff Size: Adult   Pulse: 83   Resp: 18   Temp: 97.2 °F (36.2 °C)   TempSrc: Temporal   SpO2: 96%   Weight: 70.3 kg (155 lb)   Height: 170.2 cm (67\")         Physical Exam   Constitutional: He is oriented to person, place, and time. He appears well-developed and well-nourished. No distress.   HENT:   Mouth/Throat: Oropharynx is clear and moist.   Eyes: No scleral icterus.   Neck: No hepatojugular reflux and no JVD present. Carotid bruit is not present.   Cardiovascular: Normal rate, regular rhythm, normal heart sounds and intact distal pulses. Exam reveals no friction rub.   No murmur heard.  Pulmonary/Chest: Effort normal and breath sounds normal.   Abdominal: Soft. Bowel sounds are normal. He exhibits no distension. There is no tenderness.   Musculoskeletal: He exhibits edema (mild non-pitting ankle edema).   Neurological: He is alert and oriented to person, place, and time.   Skin: Skin is warm, dry and intact. No rash noted. No cyanosis or erythema. No pallor.   Psychiatric: He has a normal mood and affect. His behavior is normal. Thought content normal.   Vitals reviewed.      Lab and Diagnostic Review:  Lab Results   Component Value Date    WBC 9.70 05/09/2019    HGB 12.3 (L) 05/09/2019    HCT 38.5 05/09/2019    MCV 83.3 05/09/2019     05/09/2019     Lab Results   Component Value " Date    GLUCOSE 116 (H) 05/17/2019    BUN 10 05/17/2019    CREATININE 1.05 05/17/2019    EGFRIFNONA 69 05/17/2019    BCR 9.5 05/17/2019    K 3.5 05/17/2019    CO2 27.9 05/17/2019    CALCIUM 9.2 05/17/2019    ALBUMIN 3.00 (L) 05/04/2019    AST 9 05/04/2019    ALT 7 05/04/2019       Assessment and Plan:         1. Chronic systolic heart failure (CMS/HCC)  EF 30%, 05/09/19  Pt declined lifevest and ICD.    Enstresto, toprol XL  Lasix  - Basic Metabolic Panel; Future    Review s/s HF worsening and when to call    2. Coronary artery disease involving native coronary artery of native heart with angina pectoris (CMS/HCC)  Severe CAD with patient declining interventions.    - Basic Metabolic Panel    3. Atrial tachycardia (CMS/HCC)  Off amio  No palps , continue to monitor    - Basic Metabolic Panel    F/u 6 months or sooner if needed.     *Please note that portions of this note were completed with a voice recognition program. Efforts were made to edit the dictations, but occasionally words are mistranscribed.

## 2019-08-26 ENCOUNTER — OFFICE VISIT (OUTPATIENT)
Dept: ONCOLOGY | Facility: CLINIC | Age: 74
End: 2019-08-26

## 2019-08-26 VITALS
WEIGHT: 155 LBS | BODY MASS INDEX: 24.33 KG/M2 | TEMPERATURE: 97.8 F | OXYGEN SATURATION: 95 % | HEART RATE: 89 BPM | DIASTOLIC BLOOD PRESSURE: 72 MMHG | SYSTOLIC BLOOD PRESSURE: 122 MMHG | HEIGHT: 67 IN | RESPIRATION RATE: 18 BRPM

## 2019-08-26 DIAGNOSIS — C15.5 MALIGNANT NEOPLASM OF LOWER THIRD OF ESOPHAGUS (HCC): Primary | ICD-10-CM

## 2019-08-26 PROCEDURE — 99213 OFFICE O/P EST LOW 20 MIN: CPT | Performed by: INTERNAL MEDICINE

## 2019-08-26 RX ORDER — DIPHENHYDRAMINE, LIDOCAINE, NYSTATIN
KIT ORAL
Qty: 240 ML | Refills: 9 | Status: SHIPPED | OUTPATIENT
Start: 2019-08-26 | End: 2019-08-26 | Stop reason: SDUPTHER

## 2019-08-26 RX ORDER — DIPHENHYDRAMINE, LIDOCAINE, NYSTATIN
KIT ORAL
Qty: 240 ML | Refills: 9 | Status: SHIPPED | OUTPATIENT
Start: 2019-08-26

## 2019-08-26 NOTE — PROGRESS NOTES
"      PROBLEM LIST:  1. Adenocarcinoma of the distal esophagus.   a) Clinical stage T3N1M0, before neoadjuvant therapy.   b) Neoadjuvant chemoradiation.   c) Resection with stage [id number], stage Turkish, resection 03/31/2008.       CHIEF COMPLAINT: Follow-up about esophageal cancer     Subjective     HISTORY OF PRESENT ILLNESS:   Mr. Arroyo is here for follow up evaluation of esophageal cancer.  He is 11 years out from his diagnosis of esophageal cancer.  Since I last saw him he was admitted due to a pericardial effusion.  Likely related to his radiotherapy.  He also has continued diarrhea issues.  And abdominal discomfort.  Usually occurs right after he eats.    Past Medical History, Past Surgical History, Social History, Family History have been reviewed and are without significant changes except as mentioned.    Review of Systems   Constitutional: Negative.    HENT: Negative.    Eyes: Negative.    Respiratory: Positive for shortness of breath.    Cardiovascular: Negative.    Gastrointestinal: Positive for abdominal pain and diarrhea.   Endocrine: Negative.    Genitourinary: Negative.    Musculoskeletal: Negative.    Skin: Negative.    Allergic/Immunologic: Negative.    Neurological: Negative.    Hematological: Negative.    Psychiatric/Behavioral: Negative.       A comprehensive 14 point review of systems was performed and was negative except as mentioned.    Medications:  The current medication list was reviewed in the EMR    ALLERGIES:  No Known Allergies    Objective      /72 Comment: RUE  Pulse 89   Temp 97.8 °F (36.6 °C) (Temporal)   Resp 18   Ht 170.2 cm (67\")   Wt 70.3 kg (155 lb)   SpO2 95% Comment: RA  BMI 24.28 kg/m²          General: well appearing, in no acute distress   HEENT: sclera anicteric, oropharynx clear  Lymphatics: no cervical, supraclavicular, or axillary adenopathy  Cardiovascular: regular rate and rhythm, no murmurs  Lungs: scattered expiratory wheezes, no rales or rhonchi    "   Abdomen: soft, nontender, nondistended.  No palpable masses or organomegaly  Extremeties: no lower extremity edema, cords or calf tenderness  Skin: no rashes, lesions, bruising, or petechiae      Assessment/Plan   IMPRESSION:     1.  Esophageal cancer.  Going to try lidocaine to see if it helps control his pain with eating.  Otherwise he does not need any imaging unless he becomes symptomatic.    2.  Pericardial effusion status post window secondary to likely radiotherapy.      I spent a total of 15 minutes in direct patient care, greater than 12 minutes (greater than 50%) were spent in coordination of care, and counseling the patient regarding esophageal cancer. Answered any questions patient had with medication and plan.      Greg Sykes MD  [facility]    8/26/2019          CC:

## 2019-09-19 DIAGNOSIS — I50.23 ACUTE ON CHRONIC SYSTOLIC CHF (CONGESTIVE HEART FAILURE) (HCC): ICD-10-CM

## 2019-09-19 RX ORDER — METOPROLOL SUCCINATE 25 MG/1
25 TABLET, EXTENDED RELEASE ORAL DAILY
Qty: 30 TABLET | Refills: 6 | Status: SHIPPED | OUTPATIENT
Start: 2019-09-19 | End: 2020-01-01 | Stop reason: SDUPTHER

## 2019-10-02 ENCOUNTER — TELEPHONE (OUTPATIENT)
Dept: CARDIOLOGY | Facility: HOSPITAL | Age: 74
End: 2019-10-02

## 2019-10-02 DIAGNOSIS — I48.91 ATRIAL FIBRILLATION WITH RAPID VENTRICULAR RESPONSE (HCC): ICD-10-CM

## 2019-10-02 RX ORDER — ATORVASTATIN CALCIUM 10 MG/1
10 TABLET, FILM COATED ORAL NIGHTLY
Qty: 30 TABLET | Refills: 11 | Status: SHIPPED | OUTPATIENT
Start: 2019-10-02 | End: 2020-01-20 | Stop reason: SDUPTHER

## 2019-10-07 RX ORDER — SACUBITRIL AND VALSARTAN 24; 26 MG/1; MG/1
TABLET, FILM COATED ORAL
Qty: 60 TABLET | Refills: 11 | Status: SHIPPED | OUTPATIENT
Start: 2019-10-07

## 2019-10-14 ENCOUNTER — OFFICE VISIT (OUTPATIENT)
Dept: CARDIOLOGY | Facility: CLINIC | Age: 74
End: 2019-10-14

## 2019-10-14 VITALS
DIASTOLIC BLOOD PRESSURE: 66 MMHG | HEART RATE: 107 BPM | BODY MASS INDEX: 25.36 KG/M2 | WEIGHT: 157.8 LBS | HEIGHT: 66 IN | SYSTOLIC BLOOD PRESSURE: 126 MMHG | OXYGEN SATURATION: 98 %

## 2019-10-14 DIAGNOSIS — I31.39 PERICARDIAL EFFUSION (NONINFLAMMATORY): ICD-10-CM

## 2019-10-14 DIAGNOSIS — I25.119 CORONARY ARTERY DISEASE INVOLVING NATIVE CORONARY ARTERY OF NATIVE HEART WITH ANGINA PECTORIS (HCC): ICD-10-CM

## 2019-10-14 DIAGNOSIS — I48.91 ATRIAL FIBRILLATION WITH RVR (HCC): Primary | ICD-10-CM

## 2019-10-14 PROCEDURE — 93000 ELECTROCARDIOGRAM COMPLETE: CPT | Performed by: INTERNAL MEDICINE

## 2019-10-14 PROCEDURE — 99214 OFFICE O/P EST MOD 30 MIN: CPT | Performed by: INTERNAL MEDICINE
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
## 2019-11-05 ENCOUNTER — TELEPHONE (OUTPATIENT)
Dept: CARDIOLOGY | Facility: CLINIC | Age: 74
End: 2019-11-05

## 2019-11-05 DIAGNOSIS — I25.119 CORONARY ARTERY DISEASE INVOLVING NATIVE CORONARY ARTERY OF NATIVE HEART WITH ANGINA PECTORIS (HCC): Primary | ICD-10-CM

## 2019-11-05 DIAGNOSIS — I50.23 ACUTE ON CHRONIC SYSTOLIC CHF (CONGESTIVE HEART FAILURE) (HCC): ICD-10-CM

## 2019-11-05 NOTE — TELEPHONE ENCOUNTER
Patient called, he wants to come in to office to discuss LHC, advised can speak with Dr Omaira franklin of procedure before but he wishes to come in before making a definite decision.

## 2019-11-18 NOTE — PROGRESS NOTES
[facility] at [facility]  IP Progress Note      Chief Complaint/Reason for service: #1 shortness of breath with tachyarrhythmia #2 left ventricular dysfunction #3 pericardial effusion #4 CAD    Subjective   Subjective: The patient states he feels 1 million times better.  He just got back from ambulating in the hallway without any dyspnea.  He denies any history of tightness having squeezing pressure chest jaw throat or arms.  He had no further edema.  According to his wife whenever he has his O2 sat check in his doctor's office is said to been okay.    Past medical, surgical, social and family history reviewed in the patient's electronic medical record.    Objective     Vital Sign Min/Max for last 24 hours  Temp  Min: 97.5 °F (36.4 °C)  Max: 98.2 °F (36.8 °C)   BP  Min: 95/56  Max: 131/71   Pulse  Min: 66  Max: 79   Resp  Min: 14  Max: 20   SpO2  Min: 89 %  Max: 98 %   Flow (L/min)  Min: 3  Max: 4      Intake/Output Summary (Last 24 hours) at 5/8/2019 0713  Last data filed at 5/7/2019 2100  Gross per 24 hour   Intake 680 ml   Output 600 ml   Net 80 ml             Current Facility-Administered Medications:   •  acetaminophen (TYLENOL) tablet 650 mg, 650 mg, Oral, Q6H PRN, Justine Bueno APRN  •  acetaminophen (TYLENOL) tablet 650 mg, 650 mg, Oral, Q4H PRN, Jayme Castano MD, 650 mg at 05/08/19 0102  •  amiodarone (PACERONE) tablet 200 mg, 200 mg, Oral, Q12H, Piyush Avitia MD  •  aspirin EC tablet 81 mg, 81 mg, Oral, Daily, Justine Bueno APRN, 81 mg at 05/07/19 0801  •  atorvastatin (LIPITOR) tablet 10 mg, 10 mg, Oral, Nightly, Valentina Elam APRN, 10 mg at 05/07/19 2102  •  heparin (porcine) 5000 UNIT/ML injection 5,000 Units, 5,000 Units, Subcutaneous, Q12H, Ravindra Morley PA, 5,000 Units at 05/07/19 2101  •  HYDROcodone-acetaminophen (NORCO) 7.5-325 MG per tablet 1 tablet, 1 tablet, Oral, Q6H PRN, Lino Cevallos PA, 1 tablet at 05/07/19 2102  •  insulin detemir  · Secondary to an acute blood loss anemia with a component of a dilutional effect from the IV fluids that the patient was receiving  · Initial hemoglobin was 12 0 - when the hemoglobin dropped to 8 she received 2 units of blood that brought her hemoglobin back up to 10 4, hemoglobin is back down to 7 6, will transfuse 2 units of PRBCs today   · Hemoglobin currently 9 7 on 11/18  · No active bleeding (LEVEMIR) injection 20 Units, 20 Units, Subcutaneous, Daily, Case, Syeda V., DO, 20 Units at 05/07/19 1222  •  insulin lispro (humaLOG) injection 0-14 Units, 0-14 Units, Subcutaneous, 4x Daily With Meals & Nightly, Case, Syeda V., DO, 3 Units at 05/07/19 2104  •  ipratropium-albuterol (DUO-NEB) nebulizer solution 3 mL, 3 mL, Nebulization, Q4H PRN, Justine Bueno APRN  •  ipratropium-albuterol (DUO-NEB) nebulizer solution 3 mL, 3 mL, Nebulization, 4x Daily - RT, Kisha Salinas PA-C, 3 mL at 05/08/19 0709  •  Magnesium Sulfate 2 gram Bolus, followed by 8 gram infusion (total Mg dose 10 grams)- Mg less than or equal to 1mg/dL, 2 g, Intravenous, PRN **OR** Magnesium Sulfate 2 gram / 50mL Infusion (GIVE X 3 BAGS TO EQUAL 6GM TOTAL DOSE) - Mg 1.1 - 1.5 mg/dl, 2 g, Intravenous, PRN **OR** Magnesium Sulfate 4 gram infusion- Mg 1.6-1.9 mg/dL, 4 g, Intravenous, PRN, Rodrigo Palacios MD, Last Rate: 25 mL/hr at 05/02/19 0826, 4 g at 05/02/19 0826  •  melatonin tablet 5 mg, 5 mg, Oral, Nightly PRN, Rodrigo Palacios MD, 5 mg at 05/07/19 2101  •  metoprolol tartrate (LOPRESSOR) half tablet 12.5 mg, 12.5 mg, Oral, Q12H, Kisha Salinas PA-C, 12.5 mg at 05/07/19 2101  •  morphine injection 2 mg, 2 mg, Intravenous, Q4H PRN, Lino Cevallos PA, 2 mg at 05/06/19 2116  •  nicotine (NICODERM CQ) 21 MG/24HR patch 1 patch, 1 patch, Transdermal, Q24H, Justine Bueno APRN, 1 patch at 05/07/19 0801  •  ondansetron (ZOFRAN) tablet 4 mg, 4 mg, Oral, Q6H PRN **OR** ondansetron (ZOFRAN) injection 4 mg, 4 mg, Intravenous, Q6H PRN, Ravindra Morley PA, 4 mg at 05/06/19 1236  •  pantoprazole (PROTONIX) EC tablet 40 mg, 40 mg, Oral, MEEK, Justine Bueno, APRN, 40 mg at 05/08/19 0620  •  Pharmacy Consult - [facility], , Does not apply, Daily, Brittany Schmidt, [facility]  •  phenylephrine (PETE-SYNEPHRINE) 50 mg in sodium chloride 0.9 % 250 mL (0.2 mg/mL) infusion, 0.5-3 mcg/kg/min, Intravenous, Titrated, Ravindra Morley PA  •  sodium chloride  0.9 % flush 3 mL, 3 mL, Intravenous, Q12H, Justine Bueno R, APRN, 3 mL at 05/06/19 2058  •  sodium chloride 0.9 % infusion, 30 mL/hr, Intravenous, Continuous, Ravindra Morley PA, Last Rate: 30 mL/hr at 05/06/19 0930, 30 mL/hr at 05/06/19 0930    Physical Exam: General Pleasant thin white male sitting in his recliner at a 90 degree angle with nasal O2 current sat 94% current heart rate 80        HEENT: No JVP, no icterus       Respiratory: Equal bilateral symmetrical expansion with crackles worse on the right than the left       Cardiovascular: Distant but regular without murmur gallop or click and no peripheral edema       GI: Positive bowel sounds       Lower Extremities: No lesions       Neuro: Cranial nerves II through XII appear grossly normal he moves all 4 extremities       Skin: Warm and dry with no edema palpation       Psych: Pleasant affect    Results Review: Chest x-ray shows very small bilateral pleural effusions otherwise no overt CHF.  Sodium is down to 130.  Intake exceeds output by greater than 2 L.    Radiology Results:  Imaging Results (last 72 hours)     Procedure Component Value Units Date/Time    XR Chest 1 View [[id number]] Collected:  05/07/19 1026     Updated:  05/07/19 2210    Narrative:       EXAMINATION: XR CHEST 1 VW-05/07/2019:      INDICATION: Postop; I31.3-Pericardial effusion (noninflammatory);  R60.0-Localized edema; I48.91-Unspecified atrial fibrillation;  I50.23-Acute on chronic systolic (congestive) heart failure; I51.9-Heart  disease, unspecified; I25.119-Atherosclerotic heart disease of native  coronary artery with unspecified angina pectoris; I30.9-Acute  pericarditis, unspecified.      COMPARISON: 05/06/2019.     FINDINGS:  The heart is upper normal size. The vasculature appears  cephalized. The patient's diffuse interstitial disease appears  unchanged. No lung consolidation is seen. Left thoracotomy tube remains  in place. There is no evidence of pneumothorax.            Impression:       Stable post left thoracotomy tube chest, with no evidence of  pneumothorax. Stable diffuse pulmonary interstitial disease.     D:  05/07/2019  E:  05/07/2019     This report was finalized on 5/7/2019 10:07 PM by DR. James Gaspar MD.       XR Chest 1 View [[id number]] Collected:  05/06/19 0939     Updated:  05/06/19 1601    Narrative:       EXAMINATION: XR CHEST 1 VW-      INDICATION: Postop; I31.3-Pericardial effusion (noninflammatory);  R60.0-Localized edema; I48.91-Unspecified atrial fibrillation;  I50.23-Acute on chronic systolic (congestive) heart failure; I51.9-Heart  disease, unspecified; I25.119-Atherosclerotic heart disease of native  coronary artery with unspecified angina pectoris; I30.9-Acute  pericarditis, unspecified.      COMPARISON: 04/30/2019.     FINDINGS: Portable chest reveals heart to be borderline enlarged with a  chest tube identified on the left. No pneumothorax. Increased markings  seen within the right mid and lower lung field. Degenerative change is  seen within the spine. Pulmonary vascularity is within normal limits.             Impression:       Chest tube on the left with no definite pneumothorax.  Minimal increased markings in the right midlung.     D:  05/06/2019  E:  05/06/2019     This report was finalized on 5/6/2019 3:58 PM by Dr. Helena Ortega MD.             EKG: Sinus rhythm incomplete right bundle branch block T wave abnormalities    ECHO: EF was 30%    Tele: Sinus rhythm    Assessment   Assessment/Plan: 1 pericardial effusion-status post window  2 ischemic cardiomyopathy-he is on metoprolol aspirin, and statin  3 tach arrhythmias-these have resolved his on p.o. Amiodarone.  His arrhythmia appears to have been a multifocal atrial tachycardia.  We will lower his amiodarone to 200 mg twice daily.  The patient can take amiodarone just for 1 month to maintain sinus rhythm following his surgery  4 left ventricular dysfunction-reassess EF today to see if the  patient needs a LifeVest prior to discharge  5 hyponatremia    Piyush Avitia MD  05/08/19  7:13 AM

## 2019-11-18 NOTE — TELEPHONE ENCOUNTER
Wife called to report patient would like to proceed with C as Dr. Castano discussed with them at last appt.  Advised will place order and scheduling will contact to set up.

## 2019-11-20 PROBLEM — I25.119 CORONARY ARTERY DISEASE INVOLVING NATIVE CORONARY ARTERY OF NATIVE HEART WITH ANGINA PECTORIS (HCC): Status: ACTIVE | Noted: 2019-11-20

## 2019-11-26 ENCOUNTER — HOSPITAL ENCOUNTER (OUTPATIENT)
Facility: HOSPITAL | Age: 74
Discharge: HOME | End: 2019-11-26
Attending: INTERNAL MEDICINE | Admitting: INTERNAL MEDICINE

## 2019-11-26 ENCOUNTER — DOCUMENTATION (OUTPATIENT)
Dept: CARDIAC REHAB | Facility: HOSPITAL | Age: 74
End: 2019-11-26

## 2019-11-26 VITALS
WEIGHT: 148.8 LBS | OXYGEN SATURATION: 95 % | HEART RATE: 95 BPM | BODY MASS INDEX: 23.91 KG/M2 | RESPIRATION RATE: 20 BRPM | HEIGHT: 66 IN | DIASTOLIC BLOOD PRESSURE: 62 MMHG | TEMPERATURE: 97.1 F | SYSTOLIC BLOOD PRESSURE: 93 MMHG

## 2019-11-26 DIAGNOSIS — I25.119 CORONARY ARTERY DISEASE INVOLVING NATIVE CORONARY ARTERY OF NATIVE HEART WITH ANGINA PECTORIS (HCC): ICD-10-CM

## 2019-11-26 DIAGNOSIS — I50.23 ACUTE ON CHRONIC SYSTOLIC CHF (CONGESTIVE HEART FAILURE) (HCC): ICD-10-CM

## 2019-11-26 LAB
ALBUMIN SERPL-MCNC: 3.5 G/DL (ref 3.5–5.2)
ALBUMIN/GLOB SERPL: 0.8 G/DL
ALP SERPL-CCNC: 73 U/L (ref 39–117)
ALT SERPL W P-5'-P-CCNC: 7 U/L (ref 1–41)
ANION GAP SERPL CALCULATED.3IONS-SCNC: 14 MMOL/L (ref 5–15)
AST SERPL-CCNC: 11 U/L (ref 1–40)
BILIRUB SERPL-MCNC: 0.4 MG/DL (ref 0.2–1.2)
BUN BLD-MCNC: 10 MG/DL (ref 8–23)
BUN/CREAT SERPL: 11.6 (ref 7–25)
CALCIUM SPEC-SCNC: 9.2 MG/DL (ref 8.6–10.5)
CHLORIDE SERPL-SCNC: 102 MMOL/L (ref 98–107)
CHOLEST SERPL-MCNC: 104 MG/DL (ref 0–200)
CO2 SERPL-SCNC: 23 MMOL/L (ref 22–29)
CREAT BLD-MCNC: 0.86 MG/DL (ref 0.76–1.27)
DEPRECATED RDW RBC AUTO: 48.1 FL (ref 37–54)
ERYTHROCYTE [DISTWIDTH] IN BLOOD BY AUTOMATED COUNT: 15.1 % (ref 12.3–15.4)
GFR SERPL CREATININE-BSD FRML MDRD: 87 ML/MIN/1.73
GLOBULIN UR ELPH-MCNC: 4.5 GM/DL
GLUCOSE BLD-MCNC: 235 MG/DL (ref 65–99)
GLUCOSE BLDC GLUCOMTR-MCNC: 109 MG/DL (ref 70–130)
HBA1C MFR BLD: 10.4 % (ref 4.8–5.6)
HCT VFR BLD AUTO: 44.6 % (ref 37.5–51)
HDLC SERPL-MCNC: 47 MG/DL (ref 40–60)
HGB BLD-MCNC: 13.6 G/DL (ref 13–17.7)
LDLC SERPL CALC-MCNC: 31 MG/DL (ref 0–100)
LDLC/HDLC SERPL: 0.66 {RATIO}
MCH RBC QN AUTO: 26.5 PG (ref 26.6–33)
MCHC RBC AUTO-ENTMCNC: 30.5 G/DL (ref 31.5–35.7)
MCV RBC AUTO: 86.8 FL (ref 79–97)
PLATELET # BLD AUTO: 288 10*3/MM3 (ref 140–450)
PMV BLD AUTO: 10 FL (ref 6–12)
POTASSIUM BLD-SCNC: 4.2 MMOL/L (ref 3.5–5.2)
PROT SERPL-MCNC: 8 G/DL (ref 6–8.5)
RBC # BLD AUTO: 5.14 10*6/MM3 (ref 4.14–5.8)
SODIUM BLD-SCNC: 139 MMOL/L (ref 136–145)
TRIGL SERPL-MCNC: 129 MG/DL (ref 0–150)
VLDLC SERPL-MCNC: 25.8 MG/DL
WBC NRBC COR # BLD: 7.92 10*3/MM3 (ref 3.4–10.8)

## 2019-11-26 PROCEDURE — 82962 GLUCOSE BLOOD TEST: CPT

## 2019-11-26 PROCEDURE — C1887 CATHETER, GUIDING: HCPCS | Performed by: INTERNAL MEDICINE

## 2019-11-26 PROCEDURE — C1769 GUIDE WIRE: HCPCS | Performed by: INTERNAL MEDICINE

## 2019-11-26 PROCEDURE — 0 IOPAMIDOL PER 1 ML: Performed by: INTERNAL MEDICINE

## 2019-11-26 PROCEDURE — 93458 L HRT ARTERY/VENTRICLE ANGIO: CPT | Performed by: INTERNAL MEDICINE

## 2019-11-26 PROCEDURE — 25010000002 MIDAZOLAM PER 1 MG: Performed by: INTERNAL MEDICINE

## 2019-11-26 PROCEDURE — 25010000002 HEPARIN (PORCINE) PER 1000 UNITS: Performed by: INTERNAL MEDICINE

## 2019-11-26 PROCEDURE — 92928 PRQ TCAT PLMT NTRAC ST 1 LES: CPT | Performed by: INTERNAL MEDICINE

## 2019-11-26 PROCEDURE — C1725 CATH, TRANSLUMIN NON-LASER: HCPCS | Performed by: INTERNAL MEDICINE

## 2019-11-26 PROCEDURE — S0260 H&P FOR SURGERY: HCPCS | Performed by: NURSE PRACTITIONER

## 2019-11-26 PROCEDURE — 85027 COMPLETE CBC AUTOMATED: CPT | Performed by: NURSE PRACTITIONER

## 2019-11-26 PROCEDURE — 25010000002 FENTANYL CITRATE (PF) 100 MCG/2ML SOLUTION: Performed by: INTERNAL MEDICINE

## 2019-11-26 PROCEDURE — C1874 STENT, COATED/COV W/DEL SYS: HCPCS | Performed by: INTERNAL MEDICINE

## 2019-11-26 PROCEDURE — 80061 LIPID PANEL: CPT | Performed by: NURSE PRACTITIONER

## 2019-11-26 PROCEDURE — C1894 INTRO/SHEATH, NON-LASER: HCPCS | Performed by: INTERNAL MEDICINE

## 2019-11-26 PROCEDURE — 25010000002 BIVALIRUDIN TRIFLUOROACETATE 250 MG RECONSTITUTED SOLUTION 1 EACH VIAL: Performed by: INTERNAL MEDICINE

## 2019-11-26 PROCEDURE — 83036 HEMOGLOBIN GLYCOSYLATED A1C: CPT | Performed by: NURSE PRACTITIONER

## 2019-11-26 PROCEDURE — 36415 COLL VENOUS BLD VENIPUNCTURE: CPT

## 2019-11-26 PROCEDURE — 80053 COMPREHEN METABOLIC PANEL: CPT | Performed by: NURSE PRACTITIONER

## 2019-11-26 PROCEDURE — C9600 PERC DRUG-EL COR STENT SING: HCPCS | Performed by: INTERNAL MEDICINE

## 2019-11-26 DEVICE — XIENCE SIERRA™ EVEROLIMUS ELUTING CORONARY STENT SYSTEM 3.25 MM X 33 MM / RAPID-EXCHANGE
Type: IMPLANTABLE DEVICE | Status: FUNCTIONAL
Brand: XIENCE SIERRA™

## 2019-11-26 DEVICE — XIENCE SIERRA™ EVEROLIMUS ELUTING CORONARY STENT SYSTEM 2.75 MM X 23 MM / RAPID-EXCHANGE
Type: IMPLANTABLE DEVICE | Status: FUNCTIONAL
Brand: XIENCE SIERRA™

## 2019-11-26 DEVICE — XIENCE SIERRA™ EVEROLIMUS ELUTING CORONARY STENT SYSTEM 3.50 MM X 12 MM / RAPID-EXCHANGE
Type: IMPLANTABLE DEVICE | Status: FUNCTIONAL
Brand: XIENCE SIERRA™

## 2019-11-26 RX ORDER — LIDOCAINE HYDROCHLORIDE 10 MG/ML
INJECTION, SOLUTION EPIDURAL; INFILTRATION; INTRACAUDAL; PERINEURAL AS NEEDED
Status: DISCONTINUED | OUTPATIENT
Start: 2019-11-26 | End: 2019-11-26 | Stop reason: HOSPADM

## 2019-11-26 RX ORDER — CLOPIDOGREL BISULFATE 75 MG/1
TABLET ORAL AS NEEDED
Status: DISCONTINUED | OUTPATIENT
Start: 2019-11-26 | End: 2019-11-26 | Stop reason: HOSPADM

## 2019-11-26 RX ORDER — SODIUM CHLORIDE 0.9 % (FLUSH) 0.9 %
10 SYRINGE (ML) INJECTION AS NEEDED
Status: DISCONTINUED | OUTPATIENT
Start: 2019-11-26 | End: 2019-11-26 | Stop reason: HOSPADM

## 2019-11-26 RX ORDER — MIDAZOLAM HYDROCHLORIDE 1 MG/ML
INJECTION INTRAMUSCULAR; INTRAVENOUS AS NEEDED
Status: DISCONTINUED | OUTPATIENT
Start: 2019-11-26 | End: 2019-11-26 | Stop reason: HOSPADM

## 2019-11-26 RX ORDER — ASPIRIN 325 MG
325 TABLET ORAL ONCE
Status: COMPLETED | OUTPATIENT
Start: 2019-11-26 | End: 2019-11-26

## 2019-11-26 RX ORDER — NITROGLYCERIN 0.4 MG/1
TABLET SUBLINGUAL
Status: DISCONTINUED
Start: 2019-11-26 | End: 2019-11-26 | Stop reason: WASHOUT

## 2019-11-26 RX ORDER — SODIUM CHLORIDE 0.9 % (FLUSH) 0.9 %
3 SYRINGE (ML) INJECTION EVERY 12 HOURS SCHEDULED
Status: DISCONTINUED | OUTPATIENT
Start: 2019-11-26 | End: 2019-11-26 | Stop reason: HOSPADM

## 2019-11-26 RX ORDER — NITROGLYCERIN 0.4 MG/1
0.4 TABLET SUBLINGUAL
Status: DISCONTINUED | OUTPATIENT
Start: 2019-11-26 | End: 2019-11-26 | Stop reason: HOSPADM

## 2019-11-26 RX ORDER — ASPIRIN 325 MG
325 TABLET, DELAYED RELEASE (ENTERIC COATED) ORAL DAILY
Status: DISCONTINUED | OUTPATIENT
Start: 2019-11-27 | End: 2019-11-26 | Stop reason: HOSPADM

## 2019-11-26 RX ORDER — SODIUM CHLORIDE 9 MG/ML
100 INJECTION, SOLUTION INTRAVENOUS CONTINUOUS
Status: ACTIVE | OUTPATIENT
Start: 2019-11-26 | End: 2019-11-26

## 2019-11-26 RX ORDER — FENTANYL CITRATE 50 UG/ML
INJECTION, SOLUTION INTRAMUSCULAR; INTRAVENOUS AS NEEDED
Status: DISCONTINUED | OUTPATIENT
Start: 2019-11-26 | End: 2019-11-26 | Stop reason: HOSPADM

## 2019-11-26 RX ORDER — CLOPIDOGREL BISULFATE 75 MG/1
75 TABLET ORAL DAILY
Qty: 30 TABLET | Refills: 11 | Status: SHIPPED | OUTPATIENT
Start: 2019-11-26 | End: 2021-01-01

## 2019-11-26 RX ORDER — ACETAMINOPHEN 325 MG/1
650 TABLET ORAL EVERY 4 HOURS PRN
Status: DISCONTINUED | OUTPATIENT
Start: 2019-11-26 | End: 2019-11-26 | Stop reason: HOSPADM

## 2019-11-26 RX ORDER — ONDANSETRON 2 MG/ML
4 INJECTION INTRAMUSCULAR; INTRAVENOUS EVERY 6 HOURS PRN
Status: DISCONTINUED | OUTPATIENT
Start: 2019-11-26 | End: 2019-11-26 | Stop reason: HOSPADM

## 2019-11-26 RX ADMIN — SODIUM CHLORIDE 100 ML/HR: 9 INJECTION, SOLUTION INTRAVENOUS at 09:42

## 2019-11-26 RX ADMIN — ASPIRIN 325 MG ORAL TABLET 325 MG: 325 PILL ORAL at 09:42

## 2019-11-27 ENCOUNTER — CALL CENTER PROGRAMS (OUTPATIENT)
Dept: CALL CENTER | Facility: HOSPITAL | Age: 74
End: 2019-11-27

## 2019-11-27 NOTE — OUTREACH NOTE
PCI Survey      Responses   Facility patient discharged from?  [facility]   Procedure date  11/26/19   PCI site:  Left, Arm   Performing MD Dr. Jayme Castano   Attempt successful?  Yes   Call start time  1343   Call end time  1345   Is the patient taking prescribed medications:  ASA, Plavix   Does the patient have an appointment scheduled with the cardiologist?  Yes   Did the patient feel prepared to go home on the same day as the procedure?  Yes   Is the patient satisfied with the same day discharge process?  Yes   PCI call completed  Yes          Nirav Quijano RN

## 2019-12-03 ENCOUNTER — DOCUMENTATION (OUTPATIENT)
Dept: CARDIAC REHAB | Facility: HOSPITAL | Age: 74
End: 2019-12-03

## 2019-12-03 NOTE — PROGRESS NOTES
Pt. Referred for Phase II Cardiac Rehab. Staff discussed benefits of exercise, program protocol, and educational material provided. Teach back verified.  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 Staff discussed home exercise guidelines and AHA exercise recommendations. Teach back verified.

## 2020-01-01 ENCOUNTER — OFFICE VISIT (OUTPATIENT)
Dept: GASTROENTEROLOGY | Facility: CLINIC | Age: 75
End: 2020-01-01

## 2020-01-01 ENCOUNTER — TELEPHONE (OUTPATIENT)
Dept: CARDIOLOGY | Facility: CLINIC | Age: 75
End: 2020-01-01

## 2020-01-01 ENCOUNTER — OFFICE VISIT (OUTPATIENT)
Dept: CARDIOLOGY | Facility: HOSPITAL | Age: 75
End: 2020-01-01

## 2020-01-01 ENCOUNTER — TELEPHONE (OUTPATIENT)
Dept: ONCOLOGY | Facility: CLINIC | Age: 75
End: 2020-01-01

## 2020-01-01 ENCOUNTER — TELEPHONE (OUTPATIENT)
Dept: CARDIOLOGY | Facility: HOSPITAL | Age: 75
End: 2020-01-01

## 2020-01-01 ENCOUNTER — OFFICE VISIT (OUTPATIENT)
Dept: ONCOLOGY | Facility: CLINIC | Age: 75
End: 2020-01-01

## 2020-01-01 ENCOUNTER — PRIOR AUTHORIZATION (OUTPATIENT)
Dept: CARDIOLOGY | Facility: CLINIC | Age: 75
End: 2020-01-01

## 2020-01-01 ENCOUNTER — OUTSIDE FACILITY SERVICE (OUTPATIENT)
Dept: GASTROENTEROLOGY | Facility: CLINIC | Age: 75
End: 2020-01-01

## 2020-01-01 ENCOUNTER — APPOINTMENT (OUTPATIENT)
Dept: PREADMISSION TESTING | Facility: HOSPITAL | Age: 75
End: 2020-01-01

## 2020-01-01 ENCOUNTER — HOSPITAL ENCOUNTER (OUTPATIENT)
Dept: CT IMAGING | Facility: HOSPITAL | Age: 75
Discharge: HOME | End: 2020-05-07
Admitting: INTERNAL MEDICINE

## 2020-01-01 ENCOUNTER — LAB REQUISITION (OUTPATIENT)
Dept: LAB | Facility: HOSPITAL | Age: 75
End: 2020-01-01

## 2020-01-01 ENCOUNTER — TELEPHONE (OUTPATIENT)
Dept: GASTROENTEROLOGY | Facility: CLINIC | Age: 75
End: 2020-01-01

## 2020-01-01 ENCOUNTER — HOSPITAL ENCOUNTER (OUTPATIENT)
Dept: CARDIOLOGY | Facility: HOSPITAL | Age: 75
Discharge: HOME | End: 2020-08-07
Admitting: INTERNAL MEDICINE

## 2020-01-01 ENCOUNTER — TRANSCRIBE ORDERS (OUTPATIENT)
Dept: CARDIOLOGY | Facility: HOSPITAL | Age: 75
End: 2020-01-01

## 2020-01-01 ENCOUNTER — OFFICE VISIT (OUTPATIENT)
Dept: CARDIOLOGY | Facility: CLINIC | Age: 75
End: 2020-01-01

## 2020-01-01 VITALS
SYSTOLIC BLOOD PRESSURE: 137 MMHG | OXYGEN SATURATION: 95 % | BODY MASS INDEX: 23.23 KG/M2 | RESPIRATION RATE: 18 BRPM | WEIGHT: 148 LBS | TEMPERATURE: 96.2 F | DIASTOLIC BLOOD PRESSURE: 72 MMHG | HEIGHT: 67 IN | HEART RATE: 95 BPM

## 2020-01-01 VITALS
WEIGHT: 138 LBS | BODY MASS INDEX: 20.92 KG/M2 | HEART RATE: 99 BPM | HEIGHT: 68 IN | DIASTOLIC BLOOD PRESSURE: 86 MMHG | TEMPERATURE: 97.1 F | SYSTOLIC BLOOD PRESSURE: 145 MMHG

## 2020-01-01 VITALS
WEIGHT: 143.1 LBS | TEMPERATURE: 97 F | BODY MASS INDEX: 21.76 KG/M2 | RESPIRATION RATE: 24 BRPM | HEART RATE: 109 BPM | DIASTOLIC BLOOD PRESSURE: 82 MMHG | SYSTOLIC BLOOD PRESSURE: 148 MMHG | OXYGEN SATURATION: 95 %

## 2020-01-01 VITALS
HEIGHT: 67 IN | WEIGHT: 142.8 LBS | TEMPERATURE: 96 F | OXYGEN SATURATION: 95 % | BODY MASS INDEX: 22.41 KG/M2 | HEART RATE: 62 BPM | SYSTOLIC BLOOD PRESSURE: 140 MMHG | DIASTOLIC BLOOD PRESSURE: 66 MMHG

## 2020-01-01 VITALS
TEMPERATURE: 97.3 F | WEIGHT: 141.2 LBS | DIASTOLIC BLOOD PRESSURE: 80 MMHG | HEART RATE: 103 BPM | BODY MASS INDEX: 21.47 KG/M2 | SYSTOLIC BLOOD PRESSURE: 129 MMHG

## 2020-01-01 DIAGNOSIS — I50.23 ACUTE ON CHRONIC SYSTOLIC CHF (CONGESTIVE HEART FAILURE) (HCC): Primary | ICD-10-CM

## 2020-01-01 DIAGNOSIS — R10.9 ABDOMINAL PAIN OF UNKNOWN CAUSE: Primary | ICD-10-CM

## 2020-01-01 DIAGNOSIS — R10.9 TRIGGER POINT OF ABDOMEN: Primary | ICD-10-CM

## 2020-01-01 DIAGNOSIS — Z85.01 PERSONAL HISTORY OF ESOPHAGEAL CANCER: Primary | ICD-10-CM

## 2020-01-01 DIAGNOSIS — Z90.49 HISTORY OF ESOPHAGECTOMY: ICD-10-CM

## 2020-01-01 DIAGNOSIS — Z85.01 PERSONAL HISTORY OF ESOPHAGEAL CANCER: ICD-10-CM

## 2020-01-01 DIAGNOSIS — R00.0 HEART RATE FAST: Primary | ICD-10-CM

## 2020-01-01 DIAGNOSIS — Z12.11 ENCOUNTER FOR SCREENING FOR MALIGNANT NEOPLASM OF COLON: ICD-10-CM

## 2020-01-01 DIAGNOSIS — I50.23 ACUTE ON CHRONIC SYSTOLIC CHF (CONGESTIVE HEART FAILURE) (HCC): ICD-10-CM

## 2020-01-01 DIAGNOSIS — R68.81 EARLY SATIETY: ICD-10-CM

## 2020-01-01 DIAGNOSIS — Z98.890 HISTORY OF ESOPHAGECTOMY: ICD-10-CM

## 2020-01-01 DIAGNOSIS — I25.10 CORONARY ARTERY DISEASE INVOLVING NATIVE CORONARY ARTERY OF NATIVE HEART WITHOUT ANGINA PECTORIS: ICD-10-CM

## 2020-01-01 DIAGNOSIS — R10.13 DYSPEPSIA: ICD-10-CM

## 2020-01-01 DIAGNOSIS — R10.9 TRIGGER POINT OF ABDOMEN: ICD-10-CM

## 2020-01-01 DIAGNOSIS — I47.1 MULTIFOCAL ATRIAL TACHYCARDIA (HCC): Primary | ICD-10-CM

## 2020-01-01 DIAGNOSIS — I50.22 CHRONIC SYSTOLIC CONGESTIVE HEART FAILURE (HCC): ICD-10-CM

## 2020-01-01 DIAGNOSIS — J44.9 CHRONIC OBSTRUCTIVE PULMONARY DISEASE, UNSPECIFIED COPD TYPE (HCC): ICD-10-CM

## 2020-01-01 DIAGNOSIS — C15.3 MALIGNANT NEOPLASM OF UPPER THIRD OF ESOPHAGUS (HCC): Primary | ICD-10-CM

## 2020-01-01 DIAGNOSIS — D12.6 ADENOMATOUS POLYP OF COLON, UNSPECIFIED PART OF COLON: ICD-10-CM

## 2020-01-01 DIAGNOSIS — E78.5 DYSLIPIDEMIA: ICD-10-CM

## 2020-01-01 DIAGNOSIS — R10.9 ABDOMINAL PAIN OF UNKNOWN CAUSE: ICD-10-CM

## 2020-01-01 DIAGNOSIS — R00.0 HEART RATE FAST: ICD-10-CM

## 2020-01-01 LAB
ANION GAP SERPL CALCULATED.3IONS-SCNC: 9.5 MMOL/L (ref 5–15)
BUN SERPL-MCNC: 11 MG/DL (ref 8–23)
BUN/CREAT SERPL: 10.9 (ref 7–25)
CALCIUM SPEC-SCNC: 9 MG/DL (ref 8.6–10.5)
CHLORIDE SERPL-SCNC: 96 MMOL/L (ref 98–107)
CO2 SERPL-SCNC: 30.5 MMOL/L (ref 22–29)
CREAT BLDA-MCNC: 0.9 MG/DL (ref 0.6–1.3)
CREAT SERPL-MCNC: 1.01 MG/DL (ref 0.76–1.27)
CYTO UR: NORMAL
GFR SERPL CREATININE-BSD FRML MDRD: 72 ML/MIN/1.73
GLUCOSE SERPL-MCNC: 193 MG/DL (ref 65–99)
LAB AP CASE REPORT: NORMAL
LAB AP CLINICAL INFORMATION: NORMAL
NT-PROBNP SERPL-MCNC: 1755 PG/ML (ref 0–1800)
PATH REPORT.FINAL DX SPEC: NORMAL
PATH REPORT.GROSS SPEC: NORMAL
POTASSIUM SERPL-SCNC: 3.5 MMOL/L (ref 3.5–5.2)
REF LAB TEST METHOD: NORMAL
SARS-COV-2 RNA RESP QL NAA+PROBE: NOT DETECTED
SODIUM SERPL-SCNC: 136 MMOL/L (ref 136–145)

## 2020-01-01 PROCEDURE — 99204 OFFICE O/P NEW MOD 45 MIN: CPT | Performed by: INTERNAL MEDICINE

## 2020-01-01 PROCEDURE — 82565 ASSAY OF CREATININE: CPT

## 2020-01-01 PROCEDURE — 43239 EGD BIOPSY SINGLE/MULTIPLE: CPT | Performed by: INTERNAL MEDICINE

## 2020-01-01 PROCEDURE — C9803 HOPD COVID-19 SPEC COLLECT: HCPCS

## 2020-01-01 PROCEDURE — 99214 OFFICE O/P EST MOD 30 MIN: CPT | Performed by: NURSE PRACTITIONER

## 2020-01-01 PROCEDURE — 93005 ELECTROCARDIOGRAM TRACING: CPT | Performed by: INTERNAL MEDICINE

## 2020-01-01 PROCEDURE — 45385 COLONOSCOPY W/LESION REMOVAL: CPT | Performed by: INTERNAL MEDICINE

## 2020-01-01 PROCEDURE — 88313 SPECIAL STAINS GROUP 2: CPT | Performed by: INTERNAL MEDICINE

## 2020-01-01 PROCEDURE — U0002 COVID-19 LAB TEST NON-CDC: HCPCS

## 2020-01-01 PROCEDURE — 99213 OFFICE O/P EST LOW 20 MIN: CPT | Performed by: NURSE PRACTITIONER

## 2020-01-01 PROCEDURE — 25010000002 IOPAMIDOL 61 % SOLUTION: Performed by: INTERNAL MEDICINE

## 2020-01-01 PROCEDURE — 83880 ASSAY OF NATRIURETIC PEPTIDE: CPT | Performed by: NURSE PRACTITIONER

## 2020-01-01 PROCEDURE — 93010 ELECTROCARDIOGRAM REPORT: CPT | Performed by: INTERNAL MEDICINE

## 2020-01-01 PROCEDURE — 88305 TISSUE EXAM BY PATHOLOGIST: CPT | Performed by: INTERNAL MEDICINE

## 2020-01-01 PROCEDURE — 99214 OFFICE O/P EST MOD 30 MIN: CPT | Performed by: INTERNAL MEDICINE

## 2020-01-01 PROCEDURE — 74177 CT ABD & PELVIS W/CONTRAST: CPT

## 2020-01-01 PROCEDURE — U0004 COV-19 TEST NON-CDC HGH THRU: HCPCS

## 2020-01-01 PROCEDURE — 80048 BASIC METABOLIC PNL TOTAL CA: CPT | Performed by: NURSE PRACTITIONER

## 2020-01-01 RX ORDER — METOPROLOL SUCCINATE 25 MG/1
25 TABLET, EXTENDED RELEASE ORAL DAILY
Qty: 30 TABLET | Refills: 3 | Status: SHIPPED | OUTPATIENT
Start: 2020-01-01

## 2020-01-01 RX ORDER — AMITRIPTYLINE HYDROCHLORIDE 25 MG/1
25 TABLET, FILM COATED ORAL NIGHTLY
Qty: 60 TABLET | Refills: 2 | Status: SHIPPED | OUTPATIENT
Start: 2020-01-01 | End: 2020-01-01 | Stop reason: SDUPTHER

## 2020-01-01 RX ORDER — SODIUM, POTASSIUM,MAG SULFATES 17.5-3.13G
2 SOLUTION, RECONSTITUTED, ORAL ORAL TAKE AS DIRECTED
Qty: 354 ML | Refills: 0 | Status: SHIPPED | OUTPATIENT
Start: 2020-01-01 | End: 2020-01-01

## 2020-01-01 RX ORDER — AMITRIPTYLINE HYDROCHLORIDE 25 MG/1
50 TABLET, FILM COATED ORAL NIGHTLY
Qty: 60 TABLET | Refills: 4 | Status: SHIPPED | OUTPATIENT
Start: 2020-01-01 | End: 2021-01-01 | Stop reason: SDUPTHER

## 2020-01-01 RX ORDER — MEGESTROL ACETATE 40 MG/ML
200 SUSPENSION ORAL
Qty: 480 ML | Refills: 5 | Status: SHIPPED | OUTPATIENT
Start: 2020-01-01

## 2020-01-01 RX ORDER — METOPROLOL SUCCINATE 25 MG/1
25 TABLET, EXTENDED RELEASE ORAL DAILY
Qty: 30 TABLET | Refills: 6 | Status: SHIPPED | OUTPATIENT
Start: 2020-01-01 | End: 2020-01-01

## 2020-01-01 RX ORDER — GLYBURIDE-METFORMIN HYDROCHLORIDE 5; 500 MG/1; MG/1
1 TABLET ORAL EVERY 12 HOURS
COMMUNITY
Start: 2020-01-01

## 2020-01-01 RX ADMIN — IOPAMIDOL 95 ML: 612 INJECTION, SOLUTION INTRAVENOUS at 14:20

## 2020-01-20 ENCOUNTER — OFFICE VISIT (OUTPATIENT)
Dept: CARDIOLOGY | Facility: CLINIC | Age: 75
End: 2020-01-20

## 2020-01-20 VITALS
DIASTOLIC BLOOD PRESSURE: 60 MMHG | HEART RATE: 76 BPM | HEIGHT: 68 IN | SYSTOLIC BLOOD PRESSURE: 96 MMHG | BODY MASS INDEX: 22.58 KG/M2 | OXYGEN SATURATION: 95 % | WEIGHT: 149 LBS

## 2020-01-20 DIAGNOSIS — I48.91 ATRIAL FIBRILLATION WITH RVR (HCC): ICD-10-CM

## 2020-01-20 DIAGNOSIS — I25.10 CORONARY ARTERY DISEASE INVOLVING NATIVE CORONARY ARTERY OF NATIVE HEART WITHOUT ANGINA PECTORIS: Primary | ICD-10-CM

## 2020-01-20 DIAGNOSIS — I48.91 ATRIAL FIBRILLATION WITH RAPID VENTRICULAR RESPONSE (HCC): ICD-10-CM

## 2020-01-20 DIAGNOSIS — I47.1 MULTIFOCAL ATRIAL TACHYCARDIA (HCC): ICD-10-CM

## 2020-01-20 DIAGNOSIS — I25.119 CORONARY ARTERY DISEASE INVOLVING NATIVE CORONARY ARTERY OF NATIVE HEART WITH ANGINA PECTORIS (HCC): ICD-10-CM

## 2020-01-20 DIAGNOSIS — I50.22 CHRONIC SYSTOLIC CONGESTIVE HEART FAILURE (HCC): ICD-10-CM

## 2020-01-20 DIAGNOSIS — E78.5 DYSLIPIDEMIA: ICD-10-CM

## 2020-01-20 PROBLEM — I50.20 SYSTOLIC CONGESTIVE HEART FAILURE (HCC): Status: ACTIVE | Noted: 2019-04-30

## 2020-01-20 PROCEDURE — 99214 OFFICE O/P EST MOD 30 MIN: CPT | Performed by: INTERNAL MEDICINE

## 2020-01-20 RX ORDER — ATORVASTATIN CALCIUM 10 MG/1
10 TABLET, FILM COATED ORAL NIGHTLY
Qty: 90 TABLET | Refills: 3 | Status: SHIPPED | OUTPATIENT
Start: 2020-01-20

## 2020-01-20 NOTE — PROGRESS NOTES
[facility] Cardiology  Subjective:     Encounter Date:01/20/2020      Patient ID: Francesco Arroyo is a  74 y.o. male. He is a retired farmer.    Chief Complaint: Atrial fibrillation with RVR      PROBLEM LIST:  1. Atrial fibrillation/MAT:  a. Noted 2019. Amiodarone started.  b. Unsuccessful ECV, 05/01/2019, Dr. Coleman: Post-CV the patient displayed atrial flutter.  c. LENKA, 05/01/2019: EF 21-25% with global hypokinesis. Moderate (grade 4) plaque in the aortic arch present. Moderate (1-2cm) pericardial effusion adjacent with features of tamponade  2. Coronary artery disease:  a. C, 05/02/2019: Severe proximal LAD and distal LAD stenosis. Moderate-severe mid-LCx stenosis. Severe distal diffuse right PDA and posterior lateral disease. EF 25%.   b. Poor surgical candidate due to severe COPD.  c. [facility], 11/26/2019: Long diffuse proximal and mid LAD plaque, with sequential 90/90% stenosis s/p 3.5 x 12 and 3.25 x 33 Xience SANDEEP. 90% ramus stenosis s/p 2.75 x 23 Xience EES. 50-70% hazy proximal RCA stenosis. Diffuse distal disease in the apical LAD, and the right PDA. Felt too small for intervention. LVEF 40% with inferior hypokinesis  3. Systolic heart failure:  a. Echo, 05/01/2019: EF 30%.  b. LENKA, 05/01/2019: EF 21-25% with global hypokinesis.  c. Echo, 05/08/2019: EF 30%.  d. LHC, 11/26/2019: EF 40%.  4. Pericardial effusion:  a. Echo, 05/01/2019: EF 30%. Moderate size pericardial effusion with mild right atrial and mild right ventricular diastolic collapse. Moderate LV enlargement and global LV hypokinesis.  b. LENKA, 05/01/2019: Moderate (1-2cm) pericardial effusion adjacent to the RV adjacent to the right atrium.   c. Pericardial window, 05/06/2019, Dr. Pearson  5. Calcification of the aortic valve:  a. Mild per echocardiogram, 05/01/2019, mainly affecting the non, left and right coronary cusp.   6. Esophageal cancer s/p  resection.  7. DM2.  8. COPD.  9. GERD.  10. Surgeries:  a. Esophageal resection, 2008  b. Pericardial window    History of Present Illness  Francesco Arroyo returns today for hostpial follow up s/p left heart catheterization and stent placement. He has a history of multifocal atrial tachycardia, coronary artery disease, systolic heart failure, pericardial effusion, and cardiac risk factors. Since last visit, he has been feeling well overall from a cardiovascular standpoint. pt admits he still smokes three cigarettes a day, one with each meal. Denies any exertional chest pain, shortness of breath, orthopnea, PND, or palpitations.    No Known Allergies      Current Outpatient Medications:   •  albuterol (PROVENTIL) (2.5 MG/3ML) 0.083% nebulizer solution, USE CONTENTS OF 1 VIAL VIA NEBULIZER EVERY 6 HOURS, Disp: , Rfl: 0  •  aspirin 81 MG EC tablet, Take 81 mg by mouth Daily., Disp: , Rfl:   •  atorvastatin (LIPITOR) 10 MG tablet, Take 1 tablet by mouth Every Night., Disp: 30 tablet, Rfl: 11  •  clopidogrel (PLAVIX) 75 MG tablet, Take 1 tablet by mouth Daily., Disp: 30 tablet, Rfl: 11  •  ENTRESTO 24-26 MG tablet, TAKE 1 TABLET BY MOUTH TWICE A DAY, Disp: 60 tablet, Rfl: 11  •  esomeprazole (nexIUM) 40 MG capsule, TAKE ONE CAPSULE BY MOUTH EVERY DAY, Disp: , Rfl: 3  •  furosemide (LASIX) 40 MG tablet, Take 40 mg by mouth As Needed. 0.5 TAB DAILY PRN, Disp: , Rfl:   •  glyBURIDE (DIAbeta) 5 MG tablet, Take 5 mg by mouth 2 (Two) Times a Day. Take 2 tablets po in the am and 1 tab in the pm, Disp: , Rfl: 3  •  HYDROcodone-acetaminophen (NORCO) 5-325 MG per tablet, Take 1 tablet by mouth Every 8 (Eight) Hours As Needed., Disp: , Rfl:   •  metFORMIN ER (GLUCOPHAGE-XR) 500 MG 24 hr tablet, Take 500 mg by mouth Every 12 (Twelve) Hours., Disp: , Rfl: 1  •  metoprolol succinate XL (TOPROL-XL) 25 MG 24 hr tablet, Take 1 tablet by mouth Daily., Disp: 30 tablet, Rfl: 6  •  nystatin susp + lidocaine viscous (MAGIC MOUTHWASH) oral  "suspension, 5-10 ml swish and spit or swallow QID prn, Disp: 240 mL, Rfl: 9    The following portions of the patient's history were reviewed and updated as appropriate: allergies, current medications, past family history, past medical history, past social history, past surgical history and problem list.    Review of Systems   Constitution: Negative.   HENT: Positive for ear pain.    Cardiovascular: Negative for chest pain, claudication, dyspnea on exertion, irregular heartbeat, leg swelling and syncope.   Respiratory: Negative.    Endocrine: Positive for cold intolerance.   Hematologic/Lymphatic: Negative for bleeding problem. Does not bruise/bleed easily.   Skin: Positive for dry skin. Negative for rash.   Musculoskeletal: Negative for muscle weakness and myalgias.   Gastrointestinal: Negative for heartburn, nausea and vomiting.   Neurological: Negative.           Objective:     Vitals:    01/20/20 1117   BP: 96/60   BP Location: Right arm   Patient Position: Sitting   Pulse: 76   SpO2: 95%   Weight: 67.6 kg (149 lb)   Height: 172.7 cm (68\")         Physical Exam   Constitutional: He is oriented to person, place, and time. He appears well-developed and well-nourished.   HENT:   Mouth/Throat: Oropharynx is clear and moist.   Neck: No JVD present. Carotid bruit is not present. No thyromegaly present.   Cardiovascular: Regular rhythm, S1 normal, S2 normal, normal heart sounds and intact distal pulses. Exam reveals no gallop, no S3 and no S4.   No murmur heard.  Pulses:       Carotid pulses are 2+ on the right side, and 2+ on the left side.       Radial pulses are 2+ on the right side, and 2+ on the left side.   Pulmonary/Chest: Breath sounds normal.   Abdominal: Soft. Bowel sounds are normal. He exhibits no mass. There is no tenderness.   Musculoskeletal: He exhibits no edema.   Neurological: He is alert and oriented to person, place, and time.   Skin: Skin is warm and dry. No rash noted.       Lab Review:  Lab " Results   Component Value Date    GLUCOSE 235 (H) 11/26/2019    BUN 10 11/26/2019    CREATININE 0.86 11/26/2019    EGFRIFNONA 87 11/26/2019    BCR 11.6 11/26/2019    K 4.2 11/26/2019    CO2 23.0 11/26/2019    CALCIUM 9.2 11/26/2019    ALBUMIN 3.50 11/26/2019    AST 11 11/26/2019    ALT 7 11/26/2019     Lab Results   Component Value Date    CHOL 104 11/26/2019    TRIG 129 11/26/2019    HDL 47 11/26/2019    LDL 31 11/26/2019      Lab Results   Component Value Date    WBC 7.92 11/26/2019    HGB 13.6 11/26/2019    HCT 44.6 11/26/2019    MCV 86.8 11/26/2019     11/26/2019     Lab Results   Component Value Date    TSH 1.380 04/30/2019           Assessment:   Francesco was seen today for atrial fibrillation with rvr.    Diagnoses and all orders for this visit:    Multifocal atrial tachycardia (CMS/HCC)    Coronary artery disease involving native coronary artery of native heart without angina pectoris    Chronic systolic congestive heart failure (CMS/HCC)    Dyslipidemia        Impression:  1. Coronary artery disease, on aspirin and Plavix for DAPT. Stable and asymptomatic.   2. Multifocal atrial tachycardia, stable and asymptomatic.  3. Nonischemic cardiomyopathy, on Entresto for heart function. Last EF was 40% by LV gram in November 2019.  4. Dyslipidemia, well-controlled on atorvastatin 10 mg.    Plan:  1. Stable cardiac status overall.  2. Pt may stop Plavix after 6 months of DAPT, in June 2020. Remain on aspirin 81 mg lifelong.  3. Continue all other current medications.  4. Revisit in 9 MO, or sooner as needed.    Scribed for Jayme Castano MD by Sonya Seth. 1/20/2020  12:09 PM    Jayme Castano MD      Please note that portions of this note may have been completed with a voice recognition program. Efforts were made to edit the dictations, but occasionally words are mistranscribed.

## 2020-05-01 NOTE — TELEPHONE ENCOUNTER
Patient wife called to report that patient has stomach pain that is constant and wanted to know if he should have xray.  I called the patient back but got voicemail   and I left [facility] [place] [place] that I would pass it on to Dr. Hinds's nurse.  Patient is not currently on any treatment.

## 2020-05-01 NOTE — TELEPHONE ENCOUNTER
Called patient and left vm. Informing patient that dr. Hinds had nurse put orders in for ct of abdomen and pelvis to look at belly. Informing him also that we will get him scheduled to see doctor afterwards.

## 2020-05-01 NOTE — TELEPHONE ENCOUNTER
----- Message from Ladan Pearson RN sent at 5/1/2020 10:55 AM EDT -----  This patient called the triage line saying that his stomach was hurting constantly and wanted to know if he should have an xray of some kind.  Not on treatment at this time.

## 2020-05-13 NOTE — PROGRESS NOTES
PROBLEM LIST:  1. Adenocarcinoma of the distal esophagus.   a) Clinical stage T3N1M0, before neoadjuvant therapy.   b) Neoadjuvant chemoradiation.   c) Resection with stage [id number], stage Maltese, resection 03/31/2008.       CHIEF COMPLAINT: Follow-up about esophageal cancer     Subjective     HISTORY OF PRESENT ILLNESS:   Mr. Arroyo is here for follow up evaluation of esophageal cancer.  He is 12 years out from his diagnosis of esophageal cancer.  He presents after having CAT scans done.  There was some concern that he may have recurrence.  So we performed a CAT scan to see if there is any disease.  He reports poor appetite and continued pain from his surgical site going back 12 years.    Past Medical History, Past Surgical History, Social History, Family History have been reviewed and are without significant changes except as mentioned.    Review of Systems   Constitutional: Positive for appetite change.   HENT: Negative.    Eyes: Negative.    Respiratory: Positive for shortness of breath.    Cardiovascular: Negative.    Gastrointestinal: Positive for abdominal pain and diarrhea.   Endocrine: Negative.    Genitourinary: Negative.    Musculoskeletal: Negative.    Skin: Negative.    Allergic/Immunologic: Negative.    Neurological: Negative.    Hematological: Negative.    Psychiatric/Behavioral: Negative.       A comprehensive 14 point review of systems was performed and was negative except as mentioned.    Medications:  The current medication list was reviewed in the EMR    ALLERGIES:  No Known Allergies    Objective      /82   Pulse 109   Temp 97 °F (36.1 °C) (Skin)   Resp 24   Wt 64.9 kg (143 lb 1.6 oz)   SpO2 95%   BMI 21.76 kg/m²          General: well appearing, in no acute distress   HEENT: sclera anicteric, oropharynx clear  Lymphatics: no cervical, supraclavicular, or axillary adenopathy  Cardiovascular: regular rate and rhythm, no murmurs  Lungs: scattered expiratory wheezes, no rales or  rhonchi      Abdomen: soft, nontender, nondistended.  No palpable masses or organomegaly  Extremeties: no lower extremity edema, cords or calf tenderness  Skin: no rashes, lesions, bruising, or petechiae    Ct Abdomen Pelvis With Contrast    Result Date: 5/8/2020  Pelvic kidney on the left. Tiny left pleural effusion which is new. There are small bilateral inguinal hernias, left greater than right. No acute intra-abdominal or pelvic abnormality. Incomplete distention of the bladder with mild wall thickening. No definite evidence of metastatic disease.  D:  05/07/2020 E:  05/07/2020  This report was finalized on 5/8/2020 9:45 AM by Dr. Helena Ortega MD.          Assessment/Plan   IMPRESSION:     1.  Esophageal cancer.  His CAT scan shows no recurrence of his disease.  At this time we can just watch him and see how he does going forward.  He has a lot of sequela to his surgery and his treatment.      2.  Pericardial effusion status post window secondary to likely radiotherapy.    3.  Anorexia.  Will give him Megace to see if it helps boost his appetite.    I spent a total of 25 minutes in direct patient care, greater than 15 minutes (greater than 50%) were spent in coordination of care, and counseling the patient regarding esophageal cancer. Answered any questions patient had with medication and plan.      Greg Sykes MD  [facility]ology [facility]    5/13/2020          CC:

## 2020-06-26 NOTE — TELEPHONE ENCOUNTER
Francesco Arroyo (Lemus: [id number])   Entresto 24-26MG tablets   Form  [employer] Tier Exceptions Form   Plan Contact  [phone] phone   [phone] fax       Determination   Wait for Determination  Please wait for the payer to return a determination.

## 2020-07-08 NOTE — PROGRESS NOTES
GASTROENTEROLOGY OFFICE NOTE  Francesco Arroyo  [id number]  1945    CARE TEAM  Patient Care Team:  Alex Velez DO as PCP - General (Family Medicine)    No ref. 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  75-year-old white male with longstanding problems of left lower quadrant abdominal pain also occasional problems with hematochezia remote history of esophageal cancer.    Patient with history of esophageal cancer in 2008 status post esophagectomy with gastric pull-up.  He has been noticing the past few months early satiety with a fullness and bloating type sensation now occurring with much smaller amount of food intake then noted 6 months ago or so.  Is not associated with any dysphagia to solids or unexplained weight loss.  He denies melena or hematochezia.  His last upper endoscopy was performed by Dr. Baker January 2016.    In terms of the left lower quadrant abdominal pain it is present at all times it is been present for many years it is unassociated with time of day, activities, food intake or bowel habits.  It is mild to moderate in intensity it does not cause him to wake up at night.    He is also complaining some nausea which seems to be related to his early satiety and occurs postprandially.    PAST MEDICAL HISTORY  Past Medical History:   Diagnosis Date   • Acid reflux    • Diabetes mellitus (CMS/HCC)    • Diabetes mellitus type 2 in nonobese (CMS/HCC)    • Esophageal cancer (CMS/HCC) 11/28/2016 2008 SURGERY   • Hyperlipidemia         PAST SURGICAL HISTORY  Past Surgical History:   Procedure Laterality Date   • CARDIAC CATHETERIZATION N/A 5/2/2019    Procedure: Left Heart Cath;  Surgeon: Jayme Castano MD;  Location: [facility] CATH IN[facility]ASIVE LOC[facility];  Service: Cardiovascular   • CARDIAC CATHETERIZATION  N/A 11/26/2019    Procedure: Left Heart Cath;  Surgeon: Jayme Castano MD;  Location:  [facility] CATH INVASIVE LOCATION;  Service: Cardiovascular   • ESOPHAGUS SURGERY     • OTHER SURGICAL HISTORY  03/31/2008    Resection of esophageal cancer   • PERICARDIAL WINDOW N/A 5/6/2019    Procedure: PERICARDIAL WINDOW, THORACOTOMY APPROACH LEFT;  Surgeon: Zaid Pearson MD;  Location:  [facility] OR;  Service: Cardiothoracic        MEDICATIONS:    Current Outpatient Medications:   •  albuterol (PROVENTIL) (2.5 MG/3ML) 0.083% nebulizer solution, USE CONTENTS OF 1 VIAL VIA NEBULIZER EVERY 6 HOURS, Disp: , Rfl: 0  •  aspirin 81 MG EC tablet, Take 81 mg by mouth Daily., Disp: , Rfl:   •  atorvastatin (LIPITOR) 10 MG tablet, Take 1 tablet by mouth Every Night., Disp: 90 tablet, Rfl: 3  •  clopidogrel (PLAVIX) 75 MG tablet, Take 1 tablet by mouth Daily., Disp: 30 tablet, Rfl: 11  •  ENTRESTO 24-26 MG tablet, TAKE 1 TABLET BY MOUTH TWICE A DAY, Disp: 60 tablet, Rfl: 11  •  esomeprazole (nexIUM) 40 MG capsule, TAKE ONE CAPSULE BY MOUTH EVERY DAY, Disp: , Rfl: 3  •  furosemide (LASIX) 40 MG tablet, Take 40 mg by mouth As Needed. 0.5 TAB DAILY PRN, Disp: , Rfl:   •  glyBURIDE (DIAbeta) 5 MG tablet, Take 5 mg by mouth 2 (Two) Times a Day. Take 2 tablets po in the am and 1 tab in the pm., Disp: , Rfl: 3  •  HYDROcodone-acetaminophen (NORCO) 5-325 MG per tablet, Take 1 tablet by mouth Every 8 (Eight) Hours As Needed., Disp: , Rfl:   •  megestrol (MEGACE) 40 MG/ML suspension, Take 5 mL by mouth 3 (Three) Times a Day With Meals. (Patient taking differently: Take 200 mg by mouth 3 (Three) Times a Day With Meals. Patient does not always take it three times a day but takes it at least once daily), Disp: 480 mL, Rfl: 5  •  metFORMIN ER (GLUCOPHAGE-XR) 500 MG 24 hr tablet, Take 500 mg by mouth Every 12 (Twelve) Hours., Disp: , Rfl: 1  •  metoprolol succinate XL (TOPROL-XL) 25 MG 24 hr tablet, Take 1 tablet by mouth Daily., Disp: 30 tablet, Rfl: 6  •   nystatin susp + lidocaine viscous (MAGIC MOUTHWASH) oral suspension, 5-10 ml swish and spit or swallow QID prn, Disp: 240 mL, Rfl: 9    ALLERGIES  No Known Allergies    FAMILY HISTORY:  Family History   Problem Relation Age of Onset   • Stroke Mother    • Cancer Father    • Colon cancer Neg Hx    • Colon polyps Neg Hx        SOCIAL HISTORY  Social History     Socioeconomic History   • Marital status:      Spouse name: Not on file   • Number of children: 1   • Years of education: Not on file   • Highest education level: Not on file   Occupational History   • Occupation: farmer     Employer: RETIRED   Tobacco Use   • Smoking status: Heavy Tobacco Smoker     Packs/day: 0.50     Years: 60.00     Pack years: 30.00     Types: Cigarettes     Last attempt to quit: 4/3/2019     Years since quittin.2   • Smokeless tobacco: Never Used   • Tobacco comment: 0.50-1 pack a day    Substance and Sexual Activity   • Alcohol use: No     Frequency: Never   • Drug use: No   • Sexual activity: Defer   Social History Narrative    Caffeine: 3 cups daily     L[place] with spouse and son     Socioeconomic History:  He is  and has 1 son does not have any grandchildren he is a 1 pack/day smoker cigarettes and does not abuse alcohol.       REVIEW OF SYSTEMS  Review of Systems   Constitutional: Positive for appetite change, fatigue and unexpected weight loss. Negative for activity change, chills, diaphoresis, fever and unexpected weight gain.   HENT: Positive for ear pain, hearing loss and rhinorrhea. Negative for congestion, dental problem, drooling, ear discharge, facial swelling, mouth sores, nosebleeds, postnasal drip, sinus pressure, sneezing, sore throat, swollen glands, tinnitus, trouble swallowing and voice change.    Respiratory: Positive for cough and wheezing. Negative for apnea, choking, chest tightness, shortness of breath and stridor.         Confirmed with the patient that the coughing and  wheezing is long term and he has had it for at least 50 years.    Cardiovascular: Negative for chest pain, palpitations and leg swelling.   Gastrointestinal: Positive for abdominal distention, abdominal pain, diarrhea, nausea, GERD and indigestion. Negative for anal bleeding, blood in stool, constipation, rectal pain and vomiting.   Endocrine: Negative for cold intolerance, heat intolerance, polydipsia, polyphagia and polyuria.   Musculoskeletal: Negative for arthralgias, back pain, gait problem, joint swelling, myalgias, neck pain, neck stiffness and bursitis.   Allergic/Immunologic: Positive for environmental allergies and immunocompromised state. Negative for food allergies.   Neurological: Negative for dizziness, tremors, seizures, syncope, facial asymmetry, speech difficulty, weakness, light-headedness, numbness, headache, memory problem and confusion.   Hematological: Negative for adenopathy. Bruises/bleeds easily.   Psychiatric/Behavioral: Positive for sleep disturbance. Negative for agitation, behavioral problems, decreased concentration, dysphoric mood, hallucinations, self-injury, suicidal ideas, negative for hyperactivity, depressed mood and stress. The patient is not nervous/anxious.      I reviewed the above-noted review of systems    PHYSICAL EXAM   /80 (BP Location: Right arm, Patient Position: Sitting, Cuff Size: Adult)   Pulse 103   Temp 97.3 °F (36.3 °C) (Temporal)   Wt 64 kg (141 lb 3.2 oz)   BMI 21.47 kg/m²   General: Alert and oriented x 3. In no apparent or acute distress.  and No stigmata of chronic liver disease  HEENT: Anicteric slcera. Normal oropharynx  Neck: Supple. Without lymphadenopathy  CV: Regular rate and rhythm, S1, S2  Lungs: Clear to auscultation without rales or rhonchi bilateral wheezing noted.  Abdomen:  Soft,non-distended without palpable masses or hepatosplenomeagaly, areas of rebound tenderness or guarding. Left lower quadrant trigger point is identified with  reproducible pain upon very light palpation.  Extremeties: without clubbing, cyanosis or edema  Neurologic:  Alert and oriented x 3 without focal motor or sensory deficits  Rectal exam: deferred      Results Review:  Old records reviewed      ASSESSMENT  1.-  History of esophageal cancer status post esophagectomy with gastric pull-up.  Surveillance recommended  2.-  Early satiety rule out neoplasia.  Likely gastroparesis contributing to his symptoms rule out gastric outlet obstruction, neoplasia etc.  3.-  Patient is due for his colon cancer screening and is agreeable to scheduling this at the time of his upper endoscopy  4.-  Myofascial left lower quadrant abdominal pain/trigger point.  Trial of amitriptyline 25 to 50 mg nightly titrated to response and tolerance recommended  5.-  Diabetes mellitus with some element of diabetic gastroparesis suspected    PLAN  1.-  Schedule EGD for esophageal cancer surveillance and further evaluation of early satiety  2.-  Schedule colonoscopy for routine colon cancer screening purposes  3.-  Trial of Elavil 25 to 50 mg p.o. nightly titrated to response and tolerance  4.-  Further recommendation deferred pending findings of the above-noted work-up and response to Elavil      Wily Grigsby MD  7/8/2020   15:20

## 2020-07-09 PROBLEM — Z90.49 HISTORY OF ESOPHAGECTOMY: Status: ACTIVE | Noted: 2020-01-01

## 2020-07-09 PROBLEM — Z98.890 HISTORY OF ESOPHAGECTOMY: Status: ACTIVE | Noted: 2020-01-01

## 2020-07-09 PROBLEM — R68.81 EARLY SATIETY: Status: ACTIVE | Noted: 2020-01-01

## 2020-07-09 PROBLEM — R10.9 TRIGGER POINT OF ABDOMEN: Status: ACTIVE | Noted: 2020-01-01

## 2020-07-09 PROBLEM — R10.13 DYSPEPSIA: Status: ACTIVE | Noted: 2020-01-01

## 2020-07-21 NOTE — TELEPHONE ENCOUNTER
Patient call.     Patient has had recent loss of taste, fatigue, nausea, and diarrhea . Negative for fever, chills, cough, new onset headaches, muscle/body aches, new loss of smell, shortness of breath, difficulty breathing, sore throat, congestion, runny nose, or vomiting.       Notified the patient he should quarantine/self isolate.   Notified the patient he needs to go to [facility] on [address] for COVID -19 today for testing and they are open from 8:00AM- 8:00PM. Gave the patient [facility] number and notified him to call them prior to arriving for testing and wear a mask. Gave the patient the health department number at [phone] or [phone] to contact as well. Confirmed the patient understood and had no additional questions.     Notified Dr. Morrell of the information above.

## 2020-09-01 NOTE — PROGRESS NOTES
GASTROENTEROLOGY OFFICE NOTE  Francesco Arroyo  [id number]  1945    CARE TEAM  Patient Care Team:  Alex Velez DO as PCP - General (Family Medicine)    Referring Provider: Alex Velez DO    Chief Complaint   Patient presents with   • Abdominal Pain     egd/colon f/u         HISTORY OF PRESENT ILLNESS:  Mr. Arroyo presents in follow-up status post EGD for complaints of early satiety with a history of esophageal cancer remotely back in 2008 and esophagectomy.  EGD was unremarkable, biopsies of the anastomosis site were suggestive of reflux esophagitis.  Negative for dysplasia and malignancy.  He had additional complaints of left lower abdominal wall pain that was felt to be myofascial in nature and he has been treated with amitriptyline 50 mg at bedtime.  He reports that his pain has improved significantly.  On a rare occasion he will have some pain with movement but otherwise this pain has resolved.  He has regained his appetite and is eating much better and no longer complains of early satiety he associates this improvement with not having abdominal pain any longer.    Colonoscopy was performed for routine screening.  Multiple polyps were removed from the colon; sigmoid colon, transverse colon, rectum, and cecum.  The rectal polyp was hyperplastic but all others were tubular adenomatous with no dysplasia.    PAST MEDICAL HISTORY  Past Medical History:   Diagnosis Date   • Acid reflux    • Diabetes mellitus (CMS/HCC)    • Diabetes mellitus type 2 in nonobese (CMS/HCC)    • Esophageal cancer (CMS/HCC) 11/28/2016 2008 SURGERY   • Hyperlipidemia         PAST SURGICAL HISTORY  Past Surgical History:   Procedure Laterality Date   • CARDIAC CATHETERIZATION N/A 5/2/2019    Procedure: Left Heart Cath;  Surgeon: Jayme Castano MD;  Location: [facility] CATH INVASIVE LOCATION;  Service: Cardiovascular   • CARDIAC CATHETERIZATION N/A 11/26/2019    Procedure: Left Heart Cath;  Surgeon: Omaira  MD Jayme;  Location: [facility] CATH INVASIVE LOC[facility];  Service: Cardiovascular   • ESOPHAGUS SURGERY     • OTHER SURGICAL HISTORY  03/31/2008    Resection of esophageal cancer   • PERICARDIAL WINDOW N/A 5/6/2019    Procedure: PERICARDIAL WINDOW, THORACOTOMY APPROACH LEFT;  Surgeon: Zaid Pearson MD;  Location: [facility] OR;  Service: Cardiothoracic        MEDICATIONS:    Current Outpatient Medications:   •  albuterol (PROVENTIL) (2.5 MG/3ML) 0.083% nebulizer solution, USE CONTENTS OF 1 VIAL VIA NEBULIZER EVERY 6 HOURS, Disp: , Rfl: 0  •  amitriptyline (Elavil) 25 MG tablet, Take 2 tablets by mouth Every Night., Disp: 60 tablet, Rfl: 4  •  aspirin 81 MG EC tablet, Take 81 mg by mouth Daily., Disp: , Rfl:   •  atorvastatin (LIPITOR) 10 MG tablet, Take 1 tablet by mouth Every Night., Disp: 90 tablet, Rfl: 3  •  clopidogrel (PLAVIX) 75 MG tablet, Take 1 tablet by mouth Daily., Disp: 30 tablet, Rfl: 11  •  ENTRESTO 24-26 MG tablet, TAKE 1 TABLET BY MOUTH TWICE A DAY, Disp: 60 tablet, Rfl: 11  •  esomeprazole (nexIUM) 40 MG capsule, TAKE ONE CAPSULE BY MOUTH EVERY DAY, Disp: , Rfl: 3  •  furosemide (LASIX) 40 MG tablet, Take 40 mg by mouth As Needed. 0.5 TAB DAILY PRN, Disp: , Rfl:   •  glyBURIDE (DIAbeta) 5 MG tablet, Take 5 mg by mouth 2 (Two) Times a Day. Take 2 tablets po in the am and 1 tab in the pm., Disp: , Rfl: 3  •  glyBURIDE-metFORMIN (GLUCOVANCE) 5-500 MG per tablet, Take 1 tablet by mouth Daily., Disp: , Rfl:   •  HYDROcodone-acetaminophen (NORCO) 5-325 MG per tablet, Take 1 tablet by mouth Every 8 (Eight) Hours As Needed., Disp: , Rfl:   •  megestrol (MEGACE) 40 MG/ML suspension, Take 5 mL by mouth 3 (Three) Times a Day With Meals. (Patient taking differently: Take 200 mg by mouth 3 (Three) Times a Day With Meals. Patient does not always take it three times a day but takes it at least once daily), Disp: 480 mL, Rfl: 5  •  metFORMIN ER (GLUCOPHAGE-XR) 500 MG 24 hr tablet, Take 500 mg by mouth Every 12  "(Twelve) Hours., Disp: , Rfl: 1  •  metoprolol succinate XL (TOPROL-XL) 25 MG 24 hr tablet, Take 1 tablet by mouth Daily., Disp: 30 tablet, Rfl: 6  •  nystatin susp + lidocaine viscous (MAGIC MOUTHWASH) oral suspension, 5-10 ml swish and spit or swallow QID prn, Disp: 240 mL, Rfl: 9    ALLERGIES  No Known Allergies    FAMILY HISTORY:  Family History   Problem Relation Age of Onset   • Stroke Mother    • Cancer Father    • Colon cancer Neg Hx    • Colon polyps Neg Hx        SOCIAL HISTORY  Social History     Socioeconomic History   • Marital status:      Spouse name: Not on file   • Number of children: 1   • Years of education: Not on file   • Highest education level: Not on file   Occupational History   • Occupation: farmer     Employer: RETIRED   Tobacco Use   • Smoking status: Light Tobacco Smoker     Packs/day: 0.50     Years: 60.00     Pack years: 30.00     Types: Cigarettes     Last attempt to quit: 4/3/2019     Years since quittin.4   • Smokeless tobacco: Never Used   • Tobacco comment: 0.50-1 pack a day    Substance and Sexual Activity   • Alcohol use: No     Frequency: Never   • Drug use: No   • Sexual activity: Defer   Social History Narrative    Caffeine: 3 cups daily     Lives in [facility] with spouse and son       REVIEW OF SYSTEMS  Review of Systems   Constitutional: Negative for activity change, appetite change, chills, diaphoresis, fatigue, fever, unexpected weight gain and unexpected weight loss.   HENT: Negative for trouble swallowing and voice change.    Gastrointestinal: Negative for abdominal distention, abdominal pain, anal bleeding, blood in stool, constipation, diarrhea, nausea, rectal pain, vomiting, GERD and indigestion.       PHYSICAL EXAM   /86 (BP Location: Right arm, Patient Position: Sitting, Cuff Size: Adult)   Pulse 99   Temp 97.1 °F (36.2 °C) (Temporal)   Ht 172.7 cm (67.99\")   Wt 62.6 kg (138 lb)   BMI 20.99 kg/m²   Physical Exam   Constitutional: He is " oriented to person, place, and time. He appears well-developed and well-nourished.   HENT:   Head: Normocephalic.   Mouth/Throat: Oropharynx is clear and moist.   Eyes: Pupils are equal, round, and reactive to light. EOM are normal.   Neck: Normal range of motion. Neck supple.   Cardiovascular: Normal rate and regular rhythm.   Pulmonary/Chest: Effort normal. He has wheezes in the right upper field, the right middle field and the left upper field. He has no rales.   Abdominal: Soft. Bowel sounds are normal. He exhibits no mass. There is no tenderness. There is no rebound and no guarding. No hernia.   Musculoskeletal: Normal range of motion.   Neurological: He is alert and oriented to person, place, and time. No cranial nerve deficit.   Skin: Skin is warm and dry.   Psychiatric: He has a normal mood and affect. His behavior is normal. Judgment normal.   Nursing note and vitals reviewed.    Results Review:  I have reviewed the patient's new clinical results.    ASSESSMENT / PLAN  1.  Trigger point of abdomen-myofascial pain  -Continue amitriptyline 50 mg at bedtime  2.  Early satiety  -Now resolved  3.  Personal history of esophageal cancer  4.  History of esophagectomy  - No recurrence evident on EGD  5.  Adenomatous colon polyps  -Due to the number of polyps removed, surveillance colonoscopy recommended in 2 years (August 2022)    Return in about 6 months (around 3/1/2021).    I discussed the patients findings and my recommendations with patient    NIMCO Cartagena

## 2020-10-26 NOTE — PROGRESS NOTES
[facility] Cardiology  Subjective:     Encounter Date: 10/26/2020      Patient ID: Francesco Arroyo is a 75 y.o. male.    Chief Complaint: Atrial Fibrillation with RVR      PROBLEM LIST:  1. Atrial fibrillation/MAT:  a. Noted 2019. Amiodarone started.  b. Unsuccessful ECV, 05/01/2019, Dr. Coleman: Post-CV the patient displayed atrial flutter.  c. LENKA, 05/01/2019: EF 21-25% with global hypokinesis. Moderate (grade 4) plaque in the aortic arch present. Moderate (1-2cm) pericardial effusion adjacent with features of tamponade  2. Coronary artery disease:  a. C, 05/02/2019: Severe proximal LAD and distal LAD stenosis. Moderate-severe mid-LCx stenosis. Severe distal diffuse right PDA and posterior lateral disease. EF 25%.   b. Poor surgical candidate due to severe COPD.  c. [facility], 11/26/2019: Long diffuse proximal and mid LAD plaque, with sequential 90/90% stenosis s/p 3.5 x 12 and 3.25 x 33 Xience SANDEEP. 90% ramus stenosis s/p 2.75 x 23 Xience EES. 50-70% hazy proximal RCA stenosis. Diffuse distal disease in the apical LAD, and the right PDA. Felt too small for intervention. LVEF 40% with inferior hypokinesis  3. Systolic heart failure:  a. Echo, 05/01/2019: EF 30%.  b. LENKA, 05/01/2019: EF 21-25% with global hypokinesis.  c. Echo, 05/08/2019: EF 30%.  d. C, 11/26/2019: EF 40%.  4. Pericardial effusion:  a. Echo, 05/01/2019: EF 30%. Moderate size pericardial effusion with mild right atrial and mild right ventricular diastolic collapse. Moderate LV enlargement and global LV hypokinesis.  b. LENKA, 05/01/2019: Moderate (1-2cm) pericardial effusion adjacent to the RV adjacent to the right atrium.   c. Pericardial window, 05/06/2019, Dr. Pearson  5. Calcification of the aortic valve:  a. Mild per echocardiogram, 05/01/2019, mainly affecting the non, left and right coronary cusp.   6. Esophageal cancer s/p resection.  7. DM2.  8. COPD.  9. GERD.  10. Surgeries:  a. Esophageal resection,  "2008  b. Pericardial window      History of Present Illness  Francesco Arroyo returns today for a 6 month follow up with a history of multifocal atrial tachycardia, CAD, systolic heart failure, pericardial effusion, and cardiac risk factors. Since last visit, patient reports he has been feeling weak recently and thinks it has to due with his blood sugar. He reports that his appetite has come and go. He gets short of breath whenever he walks so he has not been walking a lot. When he is home he mainly sits in his recliner and sleeps. Patient denies chest pain, palpitations, edema, dizziness, and syncope.  Patient has not had any ER visits, hospitalizations, surgeries, or new diagnoses since he was last seen. He says his biggest issue is his stomach in which he says some food he normally eats \"runs right through him\". He is still smoking despite being encouraged by his family to use nicotine patches.    No Known Allergies      Current Outpatient Medications:   •  albuterol (PROVENTIL) (2.5 MG/3ML) 0.083% nebulizer solution, USE CONTENTS OF 1 VIAL VIA NEBULIZER EVERY 6 HOURS, Disp: , Rfl: 0  •  amitriptyline (Elavil) 25 MG tablet, Take 2 tablets by mouth Every Night., Disp: 60 tablet, Rfl: 4  •  aspirin 81 MG EC tablet, Take 81 mg by mouth Daily., Disp: , Rfl:   •  atorvastatin (LIPITOR) 10 MG tablet, Take 1 tablet by mouth Every Night., Disp: 90 tablet, Rfl: 3  •  clopidogrel (PLAVIX) 75 MG tablet, Take 1 tablet by mouth Daily., Disp: 30 tablet, Rfl: 11  •  ENTRESTO 24-26 MG tablet, TAKE 1 TABLET BY MOUTH TWICE A DAY, Disp: 60 tablet, Rfl: 11  •  esomeprazole (nexIUM) 40 MG capsule, TAKE ONE CAPSULE BY MOUTH EVERY DAY, Disp: , Rfl: 3  •  furosemide (LASIX) 40 MG tablet, Take 40 mg by mouth As Needed. 0.5 TAB DAILY PRN, Disp: , Rfl:   •  glyBURIDE (DIAbeta) 5 MG tablet, Take 5 mg by mouth As Needed. Take 2 tablets po in the am and 1 tab in the pm., Disp: , Rfl: 3  •  glyBURIDE-metFORMIN (GLUCOVANCE) 5-500 MG per tablet, " Take 1 tablet by mouth Daily., Disp: , Rfl:   •  HYDROcodone-acetaminophen (NORCO) 5-325 MG per tablet, Take 1 tablet by mouth Every 8 (Eight) Hours As Needed., Disp: , Rfl:   •  megestrol (MEGACE) 40 MG/ML suspension, Take 5 mL by mouth 3 (Three) Times a Day With Meals. (Patient taking differently: Take 200 mg by mouth 3 (Three) Times a Day With Meals. Patient does not always take it three times a day but takes it at least once daily), Disp: 480 mL, Rfl: 5  •  metFORMIN ER (GLUCOPHAGE-XR) 500 MG 24 hr tablet, Take 500 mg by mouth Every 12 (Twelve) Hours., Disp: , Rfl: 1  •  metoprolol succinate XL (TOPROL-XL) 25 MG 24 hr tablet, Take 1 tablet by mouth Daily., Disp: 30 tablet, Rfl: 6  •  nystatin susp + lidocaine viscous (MAGIC MOUTHWASH) oral suspension, 5-10 ml swish and spit or swallow QID prn, Disp: 240 mL, Rfl: 9    The following portions of the patient's history were reviewed and updated as appropriate: allergies, current medications, past family history, past medical history, past social history, past surgical history and problem list.    Review of Systems   Constitution: Negative.   HENT: Positive for ear pain and hearing loss.    Cardiovascular: Negative for chest pain, dyspnea on exertion, leg swelling, palpitations and syncope.   Respiratory: Positive for sleep disturbances due to breathing, snoring and wheezing. Negative for shortness of breath.    Endocrine: Positive for cold intolerance.   Hematologic/Lymphatic: Bruises/bleeds easily.   Skin: Positive for dry skin. Negative for rash.   Musculoskeletal: Positive for back pain. Negative for muscle weakness and myalgias.   Gastrointestinal: Positive for heartburn. Negative for nausea and vomiting.   Genitourinary: Positive for frequency.   Neurological: Negative for dizziness, light-headedness, loss of balance and numbness.          Objective:     Vitals:    10/26/20 1037   BP: 140/66   BP Location: Left arm   Patient Position: Sitting   Pulse: 62   Temp:  "96 °F (35.6 °C)   SpO2: 95%   Weight: 64.8 kg (142 lb 12.8 oz)   Height: 170.2 cm (67\")         Vitals signs reviewed.   Constitutional:       Appearance: Well-developed and not in distress.   Neck:      Thyroid: No thyromegaly.      Vascular: No carotid bruit or JVD.   Pulmonary:      Breath sounds: Normal breath sounds.   Cardiovascular:      Regular rhythm.      No gallop. No S3 and S4 gallop.   Edema:     Peripheral edema absent.   Abdominal:      General: Bowel sounds are normal.      Palpations: Abdomen is soft. There is no abdominal mass.      Tenderness: There is no abdominal tenderness.   Musculoskeletal:         General: No deformity.      Extremities: No clubbing present.  Skin:     General: Skin is warm and dry.      Findings: No rash.   Neurological:      Mental Status: Alert and oriented to person, place, and time.         Lab Review:  Lab Results   Component Value Date    GLUCOSE 235 (H) 11/26/2019    BUN 10 11/26/2019    CREATININE 0.90 05/07/2020    EGFRIFNONA 87 11/26/2019    BCR 11.6 11/26/2019    K 4.2 11/26/2019    CO2 23.0 11/26/2019    CALCIUM 9.2 11/26/2019    ALBUMIN 3.50 11/26/2019    ALKPHOS 73 11/26/2019    AST 11 11/26/2019    ALT 7 11/26/2019     Lab Results   Component Value Date    CHOL 104 11/26/2019    TRIG 129 11/26/2019    HDL 47 11/26/2019    LDL 31 11/26/2019      Lab Results   Component Value Date    WBC 7.92 11/26/2019    RBC 5.14 11/26/2019    HGB 13.6 11/26/2019    HCT 44.6 11/26/2019    MCV 86.8 11/26/2019     11/26/2019     Lab Results   Component Value Date    TSH 1.380 04/30/2019     Lab Results   Component Value Date    HGBA1C 10.40 (H) 11/26/2019        Procedures        Assessment:   Diagnoses and all orders for this visit:    1. Multifocal atrial tachycardia (CMS/HCC) (Primary)    2. Coronary artery disease involving native coronary artery of native heart without angina pectoris    3. Chronic systolic congestive heart failure (CMS/HCC)    4. " Dyslipidemia        Impression:  1. Coronary artery disease, on aspirin and Plavix for DAPT. Stable and asymptomatic.   2. Multifocal atrial tachycardia, stable and asymptomatic.  3. Nonischemic cardiomyopathy, on Entresto and metoprolol for LV dysfunction. Last EF was 40% by LV gram in November 2019.  4. Dyslipidemia, well-controlled on atorvastatin 10 mg.  5. Dyspnea on exertion, believe this is due to his significant COPD.    Plan:  1. Schedule laboratory studies with PCP.   2. Smoking cessation counseled, the patient is not seem willing to do so  3. Discontinue furosemide.  4. Discontinue Plavix.  5. Stable cardiac status overall.   6. Continue current medications.  7. Revisit in 12 MO, or sooner as needed.    Scribed for Jayme Castano MD by Dontae Kaiser 10/26/2020  11:10 EDT    Jayme Castano MD      Please note that portions of this note may have been completed with a voice recognition program. Efforts were made to edit the dictations, but occasionally words are mistranscribed.

## 2020-11-12 NOTE — TELEPHONE ENCOUNTER
Patient is having edema in lower extremities since stopping lasix, she advises it gets better when he elevates. Advised to elevate, watch sodium intake and fluid intake and to monitor.  Understanding verbalized, she will call back if doesn't get better or symptoms get worse.

## 2020-12-14 NOTE — PROGRESS NOTES
[facility]  [facility]    12/11/2020         Francesco Arroyo  [address]  [facility] [address]  [unfilled]    1945    Alex Velez DO    Francesco Arroyo is a 75 y.o. male.      Subjective:     Chief Complaint:  Follow-up and Edema           HPI 75 male presents the office today for ongoing evaluation of his edema, worsening dyspnea.  Symptoms have been present intermittently for the past few weeks but are continuing to worsen.  Patient reports his primary care recently started him back on Lasix.  Unclear why Lasix was stopped.  He denies chest pain, palpitations, syncope, orthopnea, PND.      Patient Active Problem List   Diagnosis   • Esophageal cancer (CMS/HCC)   • Atrial fibrillation with RVR (CMS/HCC)   • Personal history of esophageal cancer   • Type 2 diabetes mellitus (CMS/HCC)   • COPD (chronic obstructive pulmonary disease), Severe physiology, FEV1 0.9L (34%)   • GERD without esophagitis   • Elevated lactic acid level   • Acute CHF (CMS/HCC)   • Hyponatremia   • Bilateral pleural effusion   • Pericardial effusion (noninflammatory)   • Systolic congestive heart failure (CMS/HCC)   • Left ventricular dysfunction   • Coronary artery disease involving native heart with angina pectoris (CMS/HCC)   • Coronary artery disease involving native coronary artery of native heart without angina pectoris   • Multifocal atrial tachycardia (CMS/HCC)   • History of esophagectomy   • Early satiety   • Dyspepsia   • Trigger point of abdomen       Past Medical History:   Diagnosis Date   • Acid reflux    • Diabetes mellitus (CMS/HCC)    • Diabetes mellitus type 2 in nonobese (CMS/HCC)    • Esophageal cancer (CMS/HCC) 11/28/2016    2008 SURGERY   • Hyperlipidemia        Past Surgical History:   Procedure Laterality Date   • CARDIAC CATHETERIZATION N/A 5/2/2019    Procedure: Left Heart Cath;  Surgeon: Jayme Castano MD;  Location: [facility] CATH INVASIVE LOCATION;  Service:  Cardiovascular   • CARDIAC CATHETERIZATION N/A 11/26/2019    Procedure: Left Heart Cath;  Surgeon: Jayme Castano MD;  Location:  [facility] CATH INVASIVE LOCATION;  Service: Cardiovascular   • ESOPHAGUS SURGERY     • OTHER SURGICAL HISTORY  03/31/2008    Resection of esophageal cancer   • PERICARDIAL WINDOW N/A 5/6/2019    Procedure: PERICARDIAL WINDOW, THORACOTOMY APPROACH LEFT;  Surgeon: Zaid Pearson MD;  Location:  [facility] OR;  Service: Cardiothoracic       Family History   Problem Relation Age of Onset   • Stroke Mother    • Cancer Father    • Colon cancer Neg Hx    • Colon polyps Neg Hx        Social History     Socioeconomic History   • Marital status:      Spouse name: Not on file   • Number of children: 1   • Years of education: Not on file   • Highest education level: Not on file   Occupational History   • Occupation: farmer     Employer: RETIRED   Tobacco Use   • Smoking status: Current Every Day Smoker     Packs/day: 1.00     Years: 60.00     Pack years: 60.00     Types: Cigarettes   • Smokeless tobacco: Never Used   Substance and Sexual Activity   • Alcohol use: No     Frequency: Never   • Drug use: No   • Sexual activity: Defer   Social History Narrative    Caffeine: 3 cups daily     Lives in [facility] with spouse and son       No Known Allergies      Current Outpatient Medications:   •  albuterol (PROVENTIL) (2.5 MG/3ML) 0.083% nebulizer solution, USE CONTENTS OF 1 VIAL VIA NEBULIZER EVERY 6 HOURS, Disp: , Rfl: 0  •  aspirin 81 MG EC tablet, Take 81 mg by mouth Daily., Disp: , Rfl:   •  atorvastatin (LIPITOR) 10 MG tablet, Take 1 tablet by mouth Every Night., Disp: 90 tablet, Rfl: 3  •  clopidogrel (PLAVIX) 75 MG tablet, Take 1 tablet by mouth Daily., Disp: 30 tablet, Rfl: 11  •  ENTRESTO 24-26 MG tablet, TAKE 1 TABLET BY MOUTH TWICE A DAY, Disp: 60 tablet, Rfl: 11  •  esomeprazole (nexIUM) 40 MG capsule, TAKE ONE CAPSULE BY MOUTH EVERY DAY, Disp: , Rfl: 3  •  furosemide (LASIX) 40 MG  tablet, Take 40 mg by mouth Daily. 0.5 TAB DAILY PRN, Disp: , Rfl:   •  glyBURIDE (DIAbeta) 5 MG tablet, Take 5 mg by mouth As Needed. Take 2 tablets po in the am and 1 tab in the pm., Disp: , Rfl: 3  •  glyBURIDE-metFORMIN (GLUCOVANCE) 5-500 MG per tablet, Take 1 tablet by mouth Daily., Disp: , Rfl:   •  HYDROcodone-acetaminophen (NORCO) 5-325 MG per tablet, Take 1 tablet by mouth Every 8 (Eight) Hours As Needed., Disp: , Rfl:   •  metFORMIN ER (GLUCOPHAGE-XR) 500 MG 24 hr tablet, Take 500 mg by mouth Every 12 (Twelve) Hours., Disp: , Rfl: 1  •  metoprolol succinate XL (TOPROL-XL) 25 MG 24 hr tablet, Take 1 tablet by mouth Daily., Disp: 30 tablet, Rfl: 6  •  amitriptyline (Elavil) 25 MG tablet, Take 2 tablets by mouth Every Night., Disp: 60 tablet, Rfl: 4  •  megestrol (MEGACE) 40 MG/ML suspension, Take 5 mL by mouth 3 (Three) Times a Day With Meals. (Patient taking differently: Take 200 mg by mouth 3 (Three) Times a Day With Meals. Patient does not always take it three times a day but takes it at least once daily), Disp: 480 mL, Rfl: 5  •  nystatin susp + lidocaine viscous (MAGIC MOUTHWASH) oral suspension, 5-10 ml swish and spit or swallow QID prn, Disp: 240 mL, Rfl: 9    The following portions of the patient's history were reviewed today and updated as appropriate: allergies, current medications, past family history, past medical history, past social history, past surgical history and problem list     Review of Systems   Constitution: Positive for malaise/fatigue. Negative for chills, decreased appetite, diaphoresis, fever, night sweats, weight gain and weight loss.   HENT: Negative for congestion, hearing loss, hoarse voice and nosebleeds.    Eyes: Negative for blurred vision, visual disturbance and visual halos.   Cardiovascular: Positive for dyspnea on exertion and leg swelling. Negative for chest pain, claudication, cyanosis, irregular heartbeat, near-syncope, orthopnea, palpitations, paroxysmal nocturnal  "dyspnea and syncope.   Respiratory: Negative for cough, hemoptysis, shortness of breath, sleep disturbances due to breathing, snoring, sputum production and wheezing.    Hematologic/Lymphatic: Negative for bleeding problem. Does not bruise/bleed easily.   Skin: Negative for dry skin, itching and rash.   Musculoskeletal: Negative for arthritis, joint pain, joint swelling and myalgias.   Gastrointestinal: Negative for bloating, abdominal pain, constipation, diarrhea, flatus, heartburn, hematemesis, hematochezia, melena, nausea and vomiting.   Genitourinary: Negative for dysuria, frequency, hematuria, nocturia and urgency.   Neurological: Negative for excessive daytime sleepiness, dizziness, headaches, light-headedness, loss of balance and weakness.   Psychiatric/Behavioral: Negative for depression. The patient does not have insomnia and is not nervous/anxious.        Objective:     Vitals:    12/11/20 1307   BP: 137/72   BP Location: Left arm   Patient Position: Sitting   Cuff Size: Adult   Pulse: 95   Resp: 18   Temp: 96.2 °F (35.7 °C)   TempSrc: Temporal   SpO2: 95%   Weight: 67.1 kg (148 lb)   Height: 170.2 cm (67\")       Body mass index is 23.18 kg/m².    Vitals signs and nursing note reviewed.   Constitutional:       General: Not in acute distress.     Appearance: Well-developed.   Eyes:      Conjunctiva/sclera: Conjunctivae normal.      Pupils: Pupils are equal, round, and reactive to light.   HENT:      Head: Normocephalic and atraumatic.   Neck:      Musculoskeletal: Normal range of motion and neck supple.      Thyroid: No thyromegaly.      Vascular: No JVD.      Trachea: No tracheal deviation.   Pulmonary:      Effort: Pulmonary effort is normal.      Breath sounds: Bibasilar Rales present.   Cardiovascular:      PMI at left midclavicular line. Normal rate. Regular rhythm. Normal S1. Normal S2.      Murmurs: There is no murmur.      No gallop. No click. No rub.   Pulses:     Intact distal pulses.   Edema:     " Pretibial: bilateral 1+ pitting edema of the pretibial area.     Ankle: bilateral 1+ pitting edema of the ankle.     Feet: bilateral 1+ pitting edema of the feet.  Abdominal:      General: Bowel sounds are normal. There is no distension.      Palpations: Abdomen is soft.      Tenderness: There is no abdominal tenderness.   Musculoskeletal: Normal range of motion.   Skin:     General: Skin is warm and dry.   Neurological:      Mental Status: Alert and oriented to person, place, and time.   Psychiatric:         Behavior: Behavior normal. Behavior is cooperative.         Lab and Diagnostic Review:  Results for orders placed or performed in visit on 12/11/20   Basic Metabolic Panel    Specimen: Blood   Result Value Ref Range    Glucose 193 (H) 65 - 99 mg/dL    BUN 11 8 - 23 mg/dL    Creatinine 1.01 0.76 - 1.27 mg/dL    Sodium 136 136 - 145 mmol/L    Potassium 3.5 3.5 - 5.2 mmol/L    Chloride 96 (L) 98 - 107 mmol/L    CO2 30.5 (H) 22.0 - 29.0 mmol/L    Calcium 9.0 8.6 - 10.5 mg/dL    eGFR Non African Amer 72 >60 mL/min/1.73    BUN/Creatinine Ratio 10.9 7.0 - 25.0    Anion Gap 9.5 5.0 - 15.0 mmol/L   proBNP    Specimen: Blood   Result Value Ref Range    proBNP 1,755.0 0.0-1,800.0 pg/mL         Assessment and Plan:   1. Acute on chronic systolic CHF (congestive heart failure) (CMS/[facility])  Heart failure education today including signs and symptoms, the role of the heart failure center, daily weights, low sodium diet (less than 1500 mg per day), and daily physical activity. Reviewed HF Zones with patient and family.  Patient to continue current medications as previously ordered. IV diuresis today in office. Patient received 40 mg lasix   today through a butterfly in the  over slow IV push. During IV diuresis, vitals were monitored and stable. Please see IV diuresis record for those vitals. Patient voided 250 ml in the office prior to discharge from the office. butterfly was d/c'd and area was free of erythema, ecchymosis, or  drainage.  Patient was  instructed to record urinary output for the next 24 hours. Patient will receive a follow up call from the HF center in 24 hours to evaluate urinary output and reassess signs and symptoms.   - Basic Metabolic Panel; Future  - proBNP; Future  - Basic Metabolic Panel  - proBNP    2. Coronary artery disease involving native coronary artery of native heart without angina pectoris  Without angina     3. Chronic obstructive pulmonary disease, unspecified COPD type (CMS/HCC)    Without exacerbation       It has been a pleasure to participate in the care of this patient.  Patient was instructed to call the [facility] with any questions, concerns, or worsening symptoms.    *Please note that portions of this note were completed with a voice recognition program. Efforts were made to edit the dictations, but occasionally words are mistranscribed.

## 2020-12-14 NOTE — TELEPHONE ENCOUNTER
Reviewed labs with patient.  Patient reports pedal edema has improved but has not resolved.  Patient to continue Lasix 40 mg daily.  We will schedule follow-up for next week with [facility].  Patient verbalized understanding.  Patient instructed to call the office with any questions, concerns, or worsening symptoms.

## 2020-12-15 NOTE — TELEPHONE ENCOUNTER
Attempted to call patient regarding his prescription for metoprolol being refilled and sent to his pharmacy.

## 2020-12-16 NOTE — TELEPHONE ENCOUNTER
Spoke with patients wife and advised her that the refill has been sent to the patients pharmacy. Patients wife verbalized understanding.

## 2021-01-01 ENCOUNTER — OFFICE VISIT (OUTPATIENT)
Dept: GASTROENTEROLOGY | Facility: CLINIC | Age: 76
End: 2021-01-01

## 2021-01-01 ENCOUNTER — OFFICE VISIT (OUTPATIENT)
Dept: ONCOLOGY | Facility: CLINIC | Age: 76
End: 2021-01-01

## 2021-01-01 ENCOUNTER — TELEPHONE (OUTPATIENT)
Dept: GASTROENTEROLOGY | Facility: CLINIC | Age: 76
End: 2021-01-01

## 2021-01-01 ENCOUNTER — HOSPITAL ENCOUNTER (OUTPATIENT)
Dept: CT IMAGING | Facility: HOSPITAL | Age: 76
End: 2021-01-01

## 2021-01-01 ENCOUNTER — APPOINTMENT (OUTPATIENT)
Dept: CT IMAGING | Facility: HOSPITAL | Age: 76
End: 2021-01-01

## 2021-01-01 ENCOUNTER — PREP FOR SURGERY (OUTPATIENT)
Dept: OTHER | Facility: HOSPITAL | Age: 76
End: 2021-01-01

## 2021-01-01 ENCOUNTER — APPOINTMENT (OUTPATIENT)
Dept: PREADMISSION TESTING | Facility: HOSPITAL | Age: 76
End: 2021-01-01

## 2021-01-01 ENCOUNTER — TELEPHONE (OUTPATIENT)
Dept: CARDIOLOGY | Facility: CLINIC | Age: 76
End: 2021-01-01

## 2021-01-01 ENCOUNTER — TELEPHONE (OUTPATIENT)
Dept: ONCOLOGY | Facility: CLINIC | Age: 76
End: 2021-01-01

## 2021-01-01 ENCOUNTER — APPOINTMENT (OUTPATIENT)
Dept: GENERAL RADIOLOGY | Facility: HOSPITAL | Age: 76
End: 2021-01-01

## 2021-01-01 ENCOUNTER — HOSPITAL ENCOUNTER (EMERGENCY)
Facility: HOSPITAL | Age: 76
Discharge: HOME | End: 2021-03-06
Attending: EMERGENCY MEDICINE | Admitting: EMERGENCY MEDICINE

## 2021-01-01 VITALS
WEIGHT: 139.8 LBS | HEIGHT: 67 IN | SYSTOLIC BLOOD PRESSURE: 105 MMHG | BODY MASS INDEX: 21.94 KG/M2 | TEMPERATURE: 97.7 F | DIASTOLIC BLOOD PRESSURE: 77 MMHG | RESPIRATION RATE: 24 BRPM | HEART RATE: 133 BPM

## 2021-01-01 VITALS
HEART RATE: 107 BPM | SYSTOLIC BLOOD PRESSURE: 129 MMHG | DIASTOLIC BLOOD PRESSURE: 78 MMHG | WEIGHT: 143.2 LBS | TEMPERATURE: 97.8 F | BODY MASS INDEX: 22.43 KG/M2

## 2021-01-01 VITALS
DIASTOLIC BLOOD PRESSURE: 50 MMHG | TEMPERATURE: 98 F | OXYGEN SATURATION: 92 % | HEIGHT: 67 IN | WEIGHT: 148 LBS | HEART RATE: 116 BPM | SYSTOLIC BLOOD PRESSURE: 112 MMHG | BODY MASS INDEX: 23.23 KG/M2 | RESPIRATION RATE: 24 BRPM

## 2021-01-01 DIAGNOSIS — R10.13 DYSPEPSIA: ICD-10-CM

## 2021-01-01 DIAGNOSIS — C79.9 METASTATIC MALIGNANT NEOPLASM, UNSPECIFIED SITE (HCC): Primary | ICD-10-CM

## 2021-01-01 DIAGNOSIS — R10.9 ABDOMINAL PAIN OF UNKNOWN CAUSE: ICD-10-CM

## 2021-01-01 DIAGNOSIS — R93.3 ABNORMAL FINDING ON GI TRACT IMAGING: ICD-10-CM

## 2021-01-01 DIAGNOSIS — Z86.010 HISTORY OF ADENOMATOUS POLYP OF COLON: Primary | ICD-10-CM

## 2021-01-01 DIAGNOSIS — C15.3 MALIGNANT NEOPLASM OF UPPER THIRD OF ESOPHAGUS (HCC): Primary | ICD-10-CM

## 2021-01-01 DIAGNOSIS — R63.4 ABNORMAL WEIGHT LOSS: Primary | ICD-10-CM

## 2021-01-01 DIAGNOSIS — R68.81 EARLY SATIETY: ICD-10-CM

## 2021-01-01 DIAGNOSIS — R63.0 ANOREXIA: ICD-10-CM

## 2021-01-01 DIAGNOSIS — I47.1 ATRIAL TACHYCARDIA (HCC): ICD-10-CM

## 2021-01-01 DIAGNOSIS — R63.4 WEIGHT LOSS: ICD-10-CM

## 2021-01-01 DIAGNOSIS — C78.7 HEPATIC METASTASIS: Primary | ICD-10-CM

## 2021-01-01 DIAGNOSIS — Z86.010 HISTORY OF ADENOMATOUS POLYP OF COLON: ICD-10-CM

## 2021-01-01 DIAGNOSIS — Z85.01 PERSONAL HISTORY OF ESOPHAGEAL CANCER: Primary | ICD-10-CM

## 2021-01-01 DIAGNOSIS — R63.4 ABNORMAL WEIGHT LOSS: ICD-10-CM

## 2021-01-01 LAB
ALBUMIN SERPL-MCNC: 3.2 G/DL (ref 3.5–5.2)
ALBUMIN SERPL-MCNC: 3.5 G/DL (ref 3.5–5.2)
ALBUMIN/GLOB SERPL: 0.8 G/DL
ALBUMIN/GLOB SERPL: 0.9 G/DL
ALP SERPL-CCNC: 433 U/L (ref 39–117)
ALP SERPL-CCNC: 778 U/L (ref 39–117)
ALT SERPL W P-5'-P-CCNC: 23 U/L (ref 1–41)
ALT SERPL-CCNC: 17 U/L (ref 1–41)
ANION GAP SERPL CALCULATED.3IONS-SCNC: 14 MMOL/L (ref 5–15)
APTT PPP: 35 SECONDS (ref 24–37)
AST SERPL-CCNC: 49 U/L (ref 1–40)
AST SERPL-CCNC: 80 U/L (ref 1–40)
BACTERIA UR QL AUTO: ABNORMAL /HPF
BASOPHILS # BLD AUTO: 0.03 10*3/MM3 (ref 0–0.2)
BASOPHILS # BLD AUTO: 0.04 10*3/MM3 (ref 0–0.2)
BASOPHILS NFR BLD AUTO: 0.3 % (ref 0–1.5)
BASOPHILS NFR BLD AUTO: 0.5 % (ref 0–1.5)
BILIRUB SERPL-MCNC: 0.5 MG/DL (ref 0–1.2)
BILIRUB SERPL-MCNC: 0.8 MG/DL (ref 0–1.2)
BILIRUB UR QL STRIP: NEGATIVE
BUN SERPL-MCNC: 10 MG/DL (ref 8–23)
BUN SERPL-MCNC: 12 MG/DL (ref 8–23)
BUN/CREAT SERPL: 10.5 (ref 7–25)
BUN/CREAT SERPL: 9.9 (ref 7–25)
CALCIUM SERPL-MCNC: 9.6 MG/DL (ref 8.6–10.5)
CALCIUM SPEC-SCNC: 10.2 MG/DL (ref 8.6–10.5)
CHLORIDE SERPL-SCNC: 96 MMOL/L (ref 98–107)
CHLORIDE SERPL-SCNC: 99 MMOL/L (ref 98–107)
CLARITY UR: CLEAR
CO2 SERPL-SCNC: 24.8 MMOL/L (ref 22–29)
CO2 SERPL-SCNC: 27 MMOL/L (ref 22–29)
COLOR UR: YELLOW
CREAT SERPL-MCNC: 1.01 MG/DL (ref 0.76–1.27)
CREAT SERPL-MCNC: 1.14 MG/DL (ref 0.76–1.27)
DEPRECATED RDW RBC AUTO: 60 FL (ref 37–54)
EOSINOPHIL # BLD AUTO: 0.01 10*3/MM3 (ref 0–0.4)
EOSINOPHIL # BLD AUTO: 0.01 10*3/MM3 (ref 0–0.4)
EOSINOPHIL NFR BLD AUTO: 0.1 % (ref 0.3–6.2)
EOSINOPHIL NFR BLD AUTO: 0.1 % (ref 0.3–6.2)
ERYTHROCYTE [DISTWIDTH] IN BLOOD BY AUTOMATED COUNT: 17.7 % (ref 12.3–15.4)
ERYTHROCYTE [DISTWIDTH] IN BLOOD BY AUTOMATED COUNT: 21.2 % (ref 12.3–15.4)
GFR SERPL CREATININE-BSD FRML MDRD: 62 ML/MIN/1.73
GLOBULIN SER CALC-MCNC: 3.7 GM/DL
GLOBULIN UR ELPH-MCNC: 4.5 GM/DL
GLUCOSE SERPL-MCNC: 173 MG/DL (ref 65–99)
GLUCOSE SERPL-MCNC: 265 MG/DL (ref 65–99)
GLUCOSE UR STRIP-MCNC: NEGATIVE MG/DL
HCT VFR BLD AUTO: 43.1 % (ref 37.5–51)
HCT VFR BLD AUTO: 45.5 % (ref 37.5–51)
HGB BLD-MCNC: 13.8 G/DL (ref 13–17.7)
HGB BLD-MCNC: 14 G/DL (ref 13–17.7)
HGB UR QL STRIP.AUTO: ABNORMAL
HOLD SPECIMEN: NORMAL
HYALINE CASTS UR QL AUTO: ABNORMAL /LPF
IMM GRANULOCYTES # BLD AUTO: 0.03 10*3/MM3 (ref 0–0.05)
IMM GRANULOCYTES # BLD AUTO: 0.05 10*3/MM3 (ref 0–0.05)
IMM GRANULOCYTES NFR BLD AUTO: 0.3 % (ref 0–0.5)
IMM GRANULOCYTES NFR BLD AUTO: 0.5 % (ref 0–0.5)
INR PPP: 1.26 (ref 0.85–1.16)
KETONES UR QL STRIP: NEGATIVE
LEUKOCYTE ESTERASE UR QL STRIP.AUTO: ABNORMAL
LIPASE SERPL-CCNC: 15 U/L (ref 13–60)
LYMPHOCYTES # BLD AUTO: 0.73 10*3/MM3 (ref 0.7–3.1)
LYMPHOCYTES # BLD AUTO: 0.91 10*3/MM3 (ref 0.7–3.1)
LYMPHOCYTES NFR BLD AUTO: 10.5 % (ref 19.6–45.3)
LYMPHOCYTES NFR BLD AUTO: 7.2 % (ref 19.6–45.3)
MCH RBC QN AUTO: 25.3 PG (ref 26.6–33)
MCH RBC QN AUTO: 26.3 PG (ref 26.6–33)
MCHC RBC AUTO-ENTMCNC: 30.8 G/DL (ref 31.5–35.7)
MCHC RBC AUTO-ENTMCNC: 32 G/DL (ref 31.5–35.7)
MCV RBC AUTO: 82.1 FL (ref 79–97)
MCV RBC AUTO: 82.3 FL (ref 79–97)
MICROCYTES BLD QL: NORMAL
MONOCYTES # BLD AUTO: 0.72 10*3/MM3 (ref 0.1–0.9)
MONOCYTES # BLD AUTO: 0.83 10*3/MM3 (ref 0.1–0.9)
MONOCYTES NFR BLD AUTO: 8.1 % (ref 5–12)
MONOCYTES NFR BLD AUTO: 8.3 % (ref 5–12)
NEUTROPHILS # BLD AUTO: 6.98 10*3/MM3 (ref 1.7–7)
NEUTROPHILS NFR BLD AUTO: 8.54 10*3/MM3 (ref 1.7–7)
NEUTROPHILS NFR BLD AUTO: 80.3 % (ref 42.7–76)
NEUTROPHILS NFR BLD AUTO: 83.8 % (ref 42.7–76)
NITRITE UR QL STRIP: NEGATIVE
NRBC BLD AUTO-RTO: 0 /100 WBC (ref 0–0.2)
NRBC BLD AUTO-RTO: 0.1 /100 WBC (ref 0–0.2)
PH UR STRIP.AUTO: 6.5 [PH] (ref 5–8)
PLAT MORPH BLD: NORMAL
PLATELET # BLD AUTO: 335 10*3/MM3 (ref 140–450)
PLATELET # BLD AUTO: 441 10*3/MM3 (ref 140–450)
PMV BLD AUTO: 9.3 FL (ref 6–12)
POTASSIUM SERPL-SCNC: 3.4 MMOL/L (ref 3.5–5.2)
POTASSIUM SERPL-SCNC: 3.9 MMOL/L (ref 3.5–5.2)
PROT SERPL-MCNC: 6.9 G/DL (ref 6–8.5)
PROT SERPL-MCNC: 8 G/DL (ref 6–8.5)
PROT UR QL STRIP: NEGATIVE
PROTHROMBIN TIME: 15.5 SECONDS (ref 11.5–14)
QT INTERVAL: 348 MS
QTC INTERVAL: 504 MS
RBC # BLD AUTO: 5.25 10*6/MM3 (ref 4.14–5.8)
RBC # BLD AUTO: 5.53 10*6/MM3 (ref 4.14–5.8)
RBC # UR: ABNORMAL /HPF
REF LAB TEST METHOD: ABNORMAL
SODIUM SERPL-SCNC: 136 MMOL/L (ref 136–145)
SODIUM SERPL-SCNC: 137 MMOL/L (ref 136–145)
SP GR UR STRIP: 1.01 (ref 1–1.03)
SQUAMOUS #/AREA URNS HPF: ABNORMAL /HPF
TROPONIN T SERPL-MCNC: <0.01 NG/ML (ref 0–0.03)
UROBILINOGEN UR QL STRIP: ABNORMAL
WBC # BLD AUTO: 10.19 10*3/MM3 (ref 3.4–10.8)
WBC # BLD AUTO: 8.69 10*3/MM3 (ref 3.4–10.8)
WBC MORPH BLD: NORMAL
WBC UR QL AUTO: ABNORMAL /HPF
WHOLE BLOOD HOLD SPECIMEN: NORMAL
WHOLE BLOOD HOLD SPECIMEN: NORMAL

## 2021-01-01 PROCEDURE — 85025 COMPLETE CBC W/AUTO DIFF WBC: CPT | Performed by: EMERGENCY MEDICINE

## 2021-01-01 PROCEDURE — 93005 ELECTROCARDIOGRAM TRACING: CPT

## 2021-01-01 PROCEDURE — 99214 OFFICE O/P EST MOD 30 MIN: CPT | Performed by: INTERNAL MEDICINE

## 2021-01-01 PROCEDURE — 85007 BL SMEAR W/DIFF WBC COUNT: CPT | Performed by: EMERGENCY MEDICINE

## 2021-01-01 PROCEDURE — 94640 AIRWAY INHALATION TREATMENT: CPT

## 2021-01-01 PROCEDURE — 85610 PROTHROMBIN TIME: CPT | Performed by: EMERGENCY MEDICINE

## 2021-01-01 PROCEDURE — 80053 COMPREHEN METABOLIC PANEL: CPT | Performed by: EMERGENCY MEDICINE

## 2021-01-01 PROCEDURE — 0 DIATRIZOATE MEGLUMINE & SODIUM PER 1 ML: Performed by: EMERGENCY MEDICINE

## 2021-01-01 PROCEDURE — 85730 THROMBOPLASTIN TIME PARTIAL: CPT | Performed by: EMERGENCY MEDICINE

## 2021-01-01 PROCEDURE — 25010000002 IOPAMIDOL 61 % SOLUTION: Performed by: EMERGENCY MEDICINE

## 2021-01-01 PROCEDURE — 71045 X-RAY EXAM CHEST 1 VIEW: CPT

## 2021-01-01 PROCEDURE — 99212 OFFICE O/P EST SF 10 MIN: CPT | Performed by: INTERNAL MEDICINE

## 2021-01-01 PROCEDURE — 84484 ASSAY OF TROPONIN QUANT: CPT | Performed by: EMERGENCY MEDICINE

## 2021-01-01 PROCEDURE — 83690 ASSAY OF LIPASE: CPT | Performed by: EMERGENCY MEDICINE

## 2021-01-01 PROCEDURE — 74177 CT ABD & PELVIS W/CONTRAST: CPT

## 2021-01-01 PROCEDURE — 81001 URINALYSIS AUTO W/SCOPE: CPT | Performed by: EMERGENCY MEDICINE

## 2021-01-01 PROCEDURE — 93005 ELECTROCARDIOGRAM TRACING: CPT | Performed by: EMERGENCY MEDICINE

## 2021-01-01 PROCEDURE — 99284 EMERGENCY DEPT VISIT MOD MDM: CPT

## 2021-01-01 PROCEDURE — 71260 CT THORAX DX C+: CPT

## 2021-01-01 RX ORDER — IPRATROPIUM BROMIDE AND ALBUTEROL SULFATE 2.5; .5 MG/3ML; MG/3ML
3 SOLUTION RESPIRATORY (INHALATION) ONCE
Status: COMPLETED | OUTPATIENT
Start: 2021-01-01 | End: 2021-01-01

## 2021-01-01 RX ORDER — AMITRIPTYLINE HYDROCHLORIDE 25 MG/1
TABLET, FILM COATED ORAL
Qty: 120 TABLET | Refills: 2 | Status: SHIPPED | OUTPATIENT
Start: 2021-01-01

## 2021-01-01 RX ORDER — SODIUM CHLORIDE 0.9 % (FLUSH) 0.9 %
10 SYRINGE (ML) INJECTION AS NEEDED
Status: DISCONTINUED | OUTPATIENT
Start: 2021-01-01 | End: 2021-01-01 | Stop reason: HOSPADM

## 2021-01-01 RX ADMIN — DIATRIZOATE MEGLUMINE AND DIATRIZOATE SODIUM 15 ML: 660; 100 LIQUID ORAL; RECTAL at 12:16

## 2021-01-01 RX ADMIN — IOPAMIDOL 85 ML: 612 INJECTION, SOLUTION INTRAVENOUS at 14:01

## 2021-01-01 RX ADMIN — IPRATROPIUM BROMIDE AND ALBUTEROL SULFATE 3 ML: 2.5; .5 SOLUTION RESPIRATORY (INHALATION) at 12:57

## 2021-01-12 NOTE — TELEPHONE ENCOUNTER
Wife took patient off 40 mg Lasix because she felt making him weak, advised was ordered by Sasha Mace in December so to notify their office for more instructions.

## 2021-02-04 NOTE — TELEPHONE ENCOUNTER
Patient states that his current dose of Elavil is not working anymore and he is still having pains can we please refill and increase this medication or would you like to change it?

## 2021-02-23 NOTE — PROGRESS NOTES
GASTROENTEROLOGY OFFICE NOTE  Francesco Arroyo  [id number]    1945    CARE TEAM  Patient Care Team:  Alex Velez DO as PCP - General (Family Medicine)    No ref. provider found     Chief Complaint   Patient presents with   • Abdominal Pain   • Weight Loss   • Fatigue        HISTORY OF PRESENT ILLNESS:  Patient with history of esophageal cancer in 2008 status post esophagectomy presents with weight loss and failure to thrive status post EGD and colonoscopy by me in August 2020 which were unremarkable.    He is here today with fatigue, 15 pound weight loss since we last saw him and poor oral intake.  He did have some colonic polyps on his colonoscopy but for the most part EGD and colonoscopy were unrevealing.    He denies dysphagia odynophagia but his appetite's been poor he denies melena or bright red blood per rectum.  He continues have some vague left lower quadrant abdominal pain but this has been present for many years and is unchanged.    PAST MEDICAL HISTORY  Past Medical History:   Diagnosis Date   • Acid reflux    • Colon polyp    • Diabetes mellitus (CMS/HCC)    • Diabetes mellitus type 2 in nonobese (CMS/HCC)    • Esophageal cancer (CMS/HCC) 11/28/2016    2008 SURGERY   • Hyperlipidemia         PAST SURGICAL HISTORY  Past Surgical History:   Procedure Laterality Date   • CARDIAC CATHETERIZATION N/A 5/2/2019    Procedure: Left Heart Cath;  Surgeon: Jayme Castano MD;  Location:  [facility] CATH INVASIVE LOCATION;  Service: Cardiovascular   • CARDIAC CATHETERIZATION N/A 11/26/2019    Procedure: Left Heart Cath;  Surgeon: Jayme Castano MD;  Location:  [facility] CATH INVASIVE LOCATION;  Service: Cardiovascular   • ESOPHAGUS SURGERY     • OTHER SURGICAL HISTORY  03/31/2008    Resection of esophageal cancer   • PERICARDIAL WINDOW N/A 5/6/2019    Procedure: PERICARDIAL WINDOW, THORACOTOMY APPROACH LEFT;  Surgeon: Zaid Pearson MD;  Location:  [facility] OR;  Service: Cardiothoracic         MEDICATIONS:    Current Outpatient Medications:   •  albuterol (PROVENTIL) (2.5 MG/3ML) 0.083% nebulizer solution, USE CONTENTS OF 1 VIAL VIA NEBULIZER EVERY 6 HOURS, Disp: , Rfl: 0  •  amitriptyline (Elavil) 25 MG tablet, Take 3 tabs at bedtime for one week then increase to 4 tabs at bedtime, Disp: 120 tablet, Rfl: 2  •  aspirin 81 MG EC tablet, Take 81 mg by mouth Daily., Disp: , Rfl:   •  atorvastatin (LIPITOR) 10 MG tablet, Take 1 tablet by mouth Every Night., Disp: 90 tablet, Rfl: 3  •  ENTRESTO 24-26 MG tablet, TAKE 1 TABLET BY MOUTH TWICE A DAY, Disp: 60 tablet, Rfl: 11  •  esomeprazole (nexIUM) 40 MG capsule, TAKE ONE CAPSULE BY MOUTH EVERY DAY, Disp: , Rfl: 3  •  furosemide (LASIX) 40 MG tablet, Take 40 mg by mouth Daily. 0.5 TAB DAILY PRN, Disp: , Rfl:   •  glyBURIDE (DIAbeta) 5 MG tablet, Take 5 mg by mouth As Needed. Take 2 tablets po in the am and 1 tab in the pm., Disp: , Rfl: 3  •  glyBURIDE-metFORMIN (GLUCOVANCE) 5-500 MG per tablet, Take 1 tablet by mouth Every 12 (Twelve) Hours., Disp: , Rfl:   •  HYDROcodone-acetaminophen (NORCO) 5-325 MG per tablet, Take 1 tablet by mouth Every 8 (Eight) Hours As Needed., Disp: , Rfl:   •  megestrol (MEGACE) 40 MG/ML suspension, Take 5 mL by mouth 3 (Three) Times a Day With Meals. (Patient taking differently: Take 200 mg by mouth 3 (Three) Times a Day With Meals. Patient does not always take it three times a day but takes it at least once daily), Disp: 480 mL, Rfl: 5  •  metFORMIN ER (GLUCOPHAGE-XR) 500 MG 24 hr tablet, Take 500 mg by mouth Every 12 (Twelve) Hours., Disp: , Rfl: 1  •  metoprolol succinate XL (TOPROL-XL) 25 MG 24 hr tablet, Take 1 tablet by mouth Daily., Disp: 30 tablet, Rfl: 3  •  clopidogrel (PLAVIX) 75 MG tablet, Take 1 tablet by mouth Daily., Disp: 30 tablet, Rfl: 11  •  nystatin susp + lidocaine viscous (MAGIC MOUTHWASH) oral suspension, 5-10 ml swish and spit or swallow QID prn, Disp: 240 mL, Rfl: 9    ALLERGIES  No Known  Allergies    FAMILY HISTORY:  Family History   Problem Relation Age of Onset   • Stroke Mother    • Cancer Father    • Colon cancer Neg Hx    • Colon polyps Neg Hx        SOCIAL HISTORY  Social History     Socioeconomic History   • Marital status:      Spouse name: Not on file   • Number of children: 1   • Years of education: Not on file   • Highest education level: Not on file   Occupational History   • Occupation: farmer     Employer: RETIRED   Tobacco Use   • Smoking status: Current Every Day Smoker     Packs/day: 1.00     Years: 60.00     Pack years: 60.00     Types: Cigarettes   • Smokeless tobacco: Never Used   Substance and Sexual Activity   • Alcohol use: No     Frequency: Never   • Drug use: No   • Sexual activity: Defer   Social History Narrative    Caffeine: 3 cups daily     Lives in [facility] with spouse and son     Socioeconomic History:  .  One son.  1 pack/day smoker cigarettes.  Does not abuse alcohol.       REVIEW OF SYSTEMS  Review of Systems   Constitutional: Positive for activity change, appetite change, fatigue and unexpected weight loss. Negative for chills, diaphoresis, fever and unexpected weight gain.   HENT: Negative for trouble swallowing and voice change.    Gastrointestinal: Positive for abdominal distention, abdominal pain, constipation, diarrhea and indigestion. Negative for anal bleeding, blood in stool, nausea, rectal pain, vomiting and GERD.     I reviewed the above-noted review of systems    PHYSICAL EXAM   /78 (BP Location: Right arm, Patient Position: Sitting, Cuff Size: Adult)   Pulse 107   Temp 97.8 °F (36.6 °C) (Temporal)   Wt 65 kg (143 lb 3.2 oz)   BMI 22.43 kg/m²   General: Alert and oriented x 3. In no apparent or acute distress.  and No stigmata of chronic liver disease  HEENT: Wearing facemask.  Anicteric sclera  Neck: Supple. Without lymphadenopathy  CV: Regular rate and rhythm, S1, S2  Lungs: Clear to ausculation. Without rales, rhonchi  and wheezing  Abdomen:  Soft,non-distended without palpable masses or hepatosplenomeagaly, areas of rebound tenderness or guarding.   Extremeties: without clubbing, cyanosis or edema  Neurologic:  Alert and oriented x 3 without focal motor or sensory deficits  Rectal exam: deferred     Results for orders placed or performed in visit on 02/23/21   Comprehensive Metabolic Panel    Specimen: Blood    BLOOD   Result Value Ref Range    Glucose 265 (H) 65 - 99 mg/dL    BUN 10 8 - 23 mg/dL    Creatinine 1.01 0.76 - 1.27 mg/dL    eGFR Non African Am 72 >60 mL/min/1.73    eGFR African Am 87 >60 mL/min/1.73    BUN/Creatinine Ratio 9.9 7.0 - 25.0    Sodium 136 136 - 145 mmol/L    Potassium 3.9 3.5 - 5.2 mmol/L    Chloride 99 98 - 107 mmol/L    Total CO2 24.8 22.0 - 29.0 mmol/L    Calcium 9.6 8.6 - 10.5 mg/dL    Total Protein 6.9 6.0 - 8.5 g/dL    Albumin 3.20 (L) 3.50 - 5.20 g/dL    Globulin 3.7 gm/dL    A/G Ratio 0.9 g/dL    Total Bilirubin 0.5 0.0 - 1.2 mg/dL    Alkaline Phosphatase 433 (H) 39 - 117 U/L    AST (SGOT) 49 (H) 1 - 40 U/L    ALT (SGPT) 17 1 - 41 U/L   CBC & Differential    BLOOD   Result Value Ref Range    WBC 8.69 3.40 - 10.80 10*3/mm3    RBC 5.25 4.14 - 5.80 10*6/mm3    Hemoglobin 13.8 13.0 - 17.7 g/dL    Hematocrit 43.1 37.5 - 51.0 %    MCV 82.1 79.0 - 97.0 fL    MCH 26.3 (L) 26.6 - 33.0 pg    MCHC 32.0 31.5 - 35.7 g/dL    RDW 17.7 (H) 12.3 - 15.4 %    Platelets 335 140 - 450 10*3/mm3    Neutrophil Rel % 80.3 (H) 42.7 - 76.0 %    Lymphocyte Rel % 10.5 (L) 19.6 - 45.3 %    Monocyte Rel % 8.3 5.0 - 12.0 %    Eosinophil Rel % 0.1 (L) 0.3 - 6.2 %    Basophil Rel % 0.5 0.0 - 1.5 %    Neutrophils Absolute 6.98 1.70 - 7.00 10*3/mm3    Lymphocytes Absolute 0.91 0.70 - 3.10 10*3/mm3    Monocytes Absolute 0.72 0.10 - 0.90 10*3/mm3    Eosinophils Absolute 0.01 0.00 - 0.40 10*3/mm3    Basophils Absolute 0.04 0.00 - 0.20 10*3/mm3    Immature Granulocyte Rel % 0.3 0.0 - 0.5 %    Immature Grans Absolute 0.03 0.00 - 0.05  10*3/mm3    nRBC 0.1 0.0 - 0.2 /100 WBC        Results Review:  I reviewed the patient's new clinical results.      ASSESSMENT  1.-Failure to thrive and generalized malaise and weight loss of unclear etiology in a patient with history of esophageal cancer.  2.-Remote history of esophageal cancer with no evidence for recurrent disease on August 2020 EGD  3.-History of adenomatous colonic polyps with large polyps removed on his colonoscopy of August 2020 with surveillance recommended in 2 years  4.-History of left lower quadrant myofascial abdominal pain    PLAN  1.-Obtain CT of chest, abdomen and pelvis for exclusion of neoplasia/metastatic disease  2.-Follow-up post CAT scan  3.-Repeat EGD given predominance of upper GI complaints      Wily Grigsby MD  2/26/2021   14:59 EST

## 2021-02-24 NOTE — PROGRESS NOTES
Recent labs noted.  AST is up from 11 units/L up to 49 units/L and alkaline phosphatase up from 73 units/L to 433 units/L when compared to a year ago.  CT of abdomen, pelvis and chest currently pending for evaluation.  Overall failure to thrive.  CBC is a relatively unremarkable/unchanged from baseline

## 2021-02-26 PROBLEM — Z86.010 HISTORY OF ADENOMATOUS POLYP OF COLON: Status: ACTIVE | Noted: 2021-01-01

## 2021-02-26 PROBLEM — R63.4 ABNORMAL WEIGHT LOSS: Status: ACTIVE | Noted: 2021-01-01

## 2021-03-06 NOTE — ED PROVIDER NOTES
Subjective   Mr. Arroyo is a 76 yo male who is here with ongoing complaints of weight loss, anorexia, and abdominal pain. He is a poor historian, giving vague answers to questions. The anorexia is always there, his pain comes and goes for no obvious reason. It is in different locations at different times. He has been having normal BMs, no vomiting. He has lost at least 15 pounds in several months. He hasn't found anything that helps his appetite. As stated already, there is nothing he does that helps or worsens the abdominal pain. He has recently been seen by GI and is to have CT scanning of abd/pelvis and chest. He has a remote history of esophageal cancer and is s/p esophagectomy.   PMH reviewed. In addition to that listed below, he has COPD, systolic CHF, multifocal atrial tachycardia, and CAD.  SH , ex-smoker      History provided by:  Medical records  History limited by: poor historian.      Review of Systems   Constitutional: Positive for appetite change and unexpected weight change.   HENT: Negative.    Respiratory: Positive for shortness of breath (chronic, mild) and wheezing.         Uses oxygen at night   Cardiovascular: Positive for leg swelling (He has had swelling but not noticing any currently). Negative for chest pain.   Gastrointestinal: Positive for abdominal pain. Negative for diarrhea, nausea and vomiting.   Neurological: Negative.    Psychiatric/Behavioral: Negative.    All other systems reviewed and are negative.      Past Medical History:   Diagnosis Date   • Acid reflux    • Colon polyp    • Diabetes mellitus (CMS/HCC)    • Diabetes mellitus type 2 in nonobese (CMS/HCC)    • Esophageal cancer (CMS/HCC) 11/28/2016 2008 SURGERY   • Hyperlipidemia        No Known Allergies    Past Surgical History:   Procedure Laterality Date   • CARDIAC CATHETERIZATION N/A 5/2/2019    Procedure: Left Heart Cath;  Surgeon: Jayme Castano MD;  Location: [facility] CATH INVASIVE LOCATION;  Service:  Cardiovascular   • CARDIAC CATHETERIZATION N/A 11/26/2019    Procedure: Left Heart Cath;  Surgeon: Jayme Castano MD;  Location:  [facility] CATH INVASIVE LOCATION;  Service: Cardiovascular   • ESOPHAGUS SURGERY     • OTHER SURGICAL HISTORY  03/31/2008    Resection of esophageal cancer   • PERICARDIAL WINDOW N/A 5/6/2019    Procedure: PERICARDIAL WINDOW, THORACOTOMY APPROACH LEFT;  Surgeon: Zaid Pearson MD;  Location:  [facility] OR;  Service: Cardiothoracic       Family History   Problem Relation Age of Onset   • Stroke Mother    • Cancer Father    • Colon cancer Neg Hx    • Colon polyps Neg Hx        Social History     Socioeconomic History   • Marital status:      Spouse name: Not on file   • Number of children: 1   • Years of education: Not on file   • Highest education level: Not on file   Tobacco Use   • Smoking status: Current Every Day Smoker     Packs/day: 1.00     Years: 60.00     Pack years: 60.00     Types: Cigarettes   • Smokeless tobacco: Never Used   Substance and Sexual Activity   • Alcohol use: No   • Drug use: No   • Sexual activity: Defer           Objective   Physical Exam  Vitals and nursing note reviewed.   Constitutional:       General: He is not in acute distress.     Appearance: Normal appearance. He is normal weight.      Comments: Pleasant older man, rambling historian   HENT:      Head: Atraumatic.      Mouth/Throat:      Comments: Airway patent  Eyes:      General: No scleral icterus.     Conjunctiva/sclera: Conjunctivae normal.   Neck:      Trachea: Phonation normal.   Cardiovascular:      Rate and Rhythm: Tachycardia present. Rhythm irregular.      Heart sounds: Normal heart sounds.   Pulmonary:      Effort: Pulmonary effort is normal. No respiratory distress.      Breath sounds: Wheezing and rhonchi present.   Abdominal:      Palpations: Abdomen is soft.      Tenderness: There is abdominal tenderness (primarily epigastric and RUQ). There is no guarding.   Musculoskeletal:       Cervical back: Neck supple.      Comments: Trace pretibial pitting edema   Skin:     General: Skin is warm and dry.   Neurological:      Mental Status: He is alert and oriented to person, place, and time.   Psychiatric:         Mood and Affect: Mood normal.         Behavior: Behavior normal.         Procedures           ED Course  ED Course as of Mar 06 1836   Sat Mar 06, 2021   1457 Discussed with Dr. Thurman who will notify Dr. Grigsby' office to set up liver biopsy. I have informed patient of the worrisome CT scans. He feels comfortable enough to have outpatient workup and is accepting of the bad news. He has had persisting tachycardia here but does have an already existing multifocal atrial tachycardia diagnosis. He is hemodynamically stable with this tachycardia.    [LI]      ED Course User Index  [LI] Rito Galindo MD      Recent Results (from the past 24 hour(s))   Comprehensive Metabolic Panel    Collection Time: 03/06/21 11:37 AM    Specimen: Blood   Result Value Ref Range    Glucose 173 (H) 65 - 99 mg/dL    BUN 12 8 - 23 mg/dL    Creatinine 1.14 0.76 - 1.27 mg/dL    Sodium 137 136 - 145 mmol/L    Potassium 3.4 (L) 3.5 - 5.2 mmol/L    Chloride 96 (L) 98 - 107 mmol/L    CO2 27.0 22.0 - 29.0 mmol/L    Calcium 10.2 8.6 - 10.5 mg/dL    Total Protein 8.0 6.0 - 8.5 g/dL    Albumin 3.50 3.50 - 5.20 g/dL    ALT (SGPT) 23 1 - 41 U/L    AST (SGOT) 80 (H) 1 - 40 U/L    Alkaline Phosphatase 778 (H) 39 - 117 U/L    Total Bilirubin 0.8 0.0 - 1.2 mg/dL    eGFR Non African Amer 62 >60 mL/min/1.73    Globulin 4.5 gm/dL    A/G Ratio 0.8 g/dL    BUN/Creatinine Ratio 10.5 7.0 - 25.0    Anion Gap 14.0 5.0 - 15.0 mmol/L   Lipase    Collection Time: 03/06/21 11:37 AM    Specimen: Blood   Result Value Ref Range    Lipase 15 13 - 60 U/L   Troponin    Collection Time: 03/06/21 11:37 AM    Specimen: Blood   Result Value Ref Range    Troponin T <0.010 0.000 - 0.030 ng/mL   Light Blue Top    Collection Time: 03/06/21 11:37 AM    Result Value Ref Range    Extra Tube hold for add-on    Green Top (Gel)    Collection Time: 03/06/21 11:37 AM   Result Value Ref Range    Extra Tube Hold for add-ons.    Lavender Top    Collection Time: 03/06/21 11:37 AM   Result Value Ref Range    Extra Tube hold for add-on    Gold Top - SST    Collection Time: 03/06/21 11:37 AM   Result Value Ref Range    Extra Tube Hold for add-ons.    Gray Top - Ice    Collection Time: 03/06/21 11:37 AM   Result Value Ref Range    Extra Tube Hold for add-ons.    CBC Auto Differential    Collection Time: 03/06/21 11:37 AM    Specimen: Blood   Result Value Ref Range    WBC 10.19 3.40 - 10.80 10*3/mm3    RBC 5.53 4.14 - 5.80 10*6/mm3    Hemoglobin 14.0 13.0 - 17.7 g/dL    Hematocrit 45.5 37.5 - 51.0 %    MCV 82.3 79.0 - 97.0 fL    MCH 25.3 (L) 26.6 - 33.0 pg    MCHC 30.8 (L) 31.5 - 35.7 g/dL    RDW 21.2 (H) 12.3 - 15.4 %    RDW-SD 60.0 (H) 37.0 - 54.0 fl    MPV 9.3 6.0 - 12.0 fL    Platelets 441 140 - 450 10*3/mm3    Neutrophil % 83.8 (H) 42.7 - 76.0 %    Lymphocyte % 7.2 (L) 19.6 - 45.3 %    Monocyte % 8.1 5.0 - 12.0 %    Eosinophil % 0.1 (L) 0.3 - 6.2 %    Basophil % 0.3 0.0 - 1.5 %    Immature Grans % 0.5 0.0 - 0.5 %    Neutrophils, Absolute 8.54 (H) 1.70 - 7.00 10*3/mm3    Lymphocytes, Absolute 0.73 0.70 - 3.10 10*3/mm3    Monocytes, Absolute 0.83 0.10 - 0.90 10*3/mm3    Eosinophils, Absolute 0.01 0.00 - 0.40 10*3/mm3    Basophils, Absolute 0.03 0.00 - 0.20 10*3/mm3    Immature Grans, Absolute 0.05 0.00 - 0.05 10*3/mm3    nRBC 0.0 0.0 - 0.2 /100 WBC   Scan Slide    Collection Time: 03/06/21 11:37 AM    Specimen: Blood   Result Value Ref Range    Microcytes Slight/1+ None Seen    WBC Morphology Normal Normal    Platelet Morphology Normal Normal   Protime-INR    Collection Time: 03/06/21 11:37 AM    Specimen: Blood   Result Value Ref Range    Protime 15.5 (H) 11.5 - 14.0 Seconds    INR 1.26 (H) 0.85 - 1.16   aPTT    Collection Time: 03/06/21 11:37 AM    Specimen: Blood   Result  Value Ref Range    PTT 35.0 24.0 - 37.0 seconds   ECG 12 Lead    Collection Time: 03/06/21 11:43 AM   Result Value Ref Range    QT Interval 348 ms    QTC Interval 504 ms   Urinalysis With Microscopic If Indicated (No Culture) - Urine, Clean Catch    Collection Time: 03/06/21 12:21 PM    Specimen: Urine, Clean Catch   Result Value Ref Range    Color, UA Yellow Yellow, Straw    Appearance, UA Clear Clear    pH, UA 6.5 5.0 - 8.0    Specific Gravity, UA 1.008 1.001 - 1.030    Glucose, UA Negative Negative    Ketones, UA Negative Negative    Bilirubin, UA Negative Negative    Blood, UA Trace (A) Negative    Protein, UA Negative Negative    Leuk Esterase, UA Small (1+) (A) Negative    Nitrite, UA Negative Negative    Urobilinogen, UA 0.2 E.U./dL 0.2 - 1.0 E.U./dL   Urinalysis, Microscopic Only - Urine, Clean Catch    Collection Time: 03/06/21 12:21 PM    Specimen: Urine, Clean Catch   Result Value Ref Range    RBC, UA 3-6 (A) None Seen, 0-2 /HPF    WBC, UA 6-12 (A) None Seen, 0-2 /HPF    Bacteria, UA None Seen None Seen, Trace /HPF    Squamous Epithelial Cells, UA None Seen None Seen, 0-2 /HPF    Hyaline Casts, UA 0-6 0 - 6 /LPF    Methodology Automated Microscopy      Note: In addition to lab results from this visit, the labs listed above may include labs taken at another facility or during a different encounter within the last 24 hours. Please correlate lab times with ED admission and discharge times for further clarification of the services performed during this visit.    CT Abdomen Pelvis With Contrast   Preliminary Result   Development of metastatic disease within the mediastinum and   right hilar region as well as pulmonary mass seen in the right lower   lobe and 2 pleural-based nodule seen anteriorly within the upper lobes.   There is diffuse hepatic metastatic disease identified. Remainder of the   chest, abdomen and pelvis reveals no acute abnormality.       DICTATED:   03/06/2021   EDITED/ls :   03/06/2021               CT Chest With Contrast Diagnostic   Preliminary Result   Development of metastatic disease within the mediastinum and   right hilar region as well as pulmonary mass seen in the right lower   lobe and 2 pleural-based nodule seen anteriorly within the upper lobes.   There is diffuse hepatic metastatic disease identified. Remainder of the   chest, abdomen and pelvis reveals no acute abnormality.       DICTATED:   03/06/2021   EDITED/ls :   03/06/2021              XR Chest 1 View   Preliminary Result   Worsening of the aeration at the lung bases with increased   markings in the lung bases bilaterally and development of a small left   pleural effusion.       DICTATED:   03/06/2021   EDITED/ls :   03/06/2021                Vitals:    03/06/21 1257 03/06/21 1330 03/06/21 1430 03/06/21 1500   BP:  112/71 106/78 112/50   BP Location:       Patient Position:       Pulse: (!) 130 (!) 123 116    Resp: 20 20 24    Temp:       TempSrc:       SpO2: 95% 99% 93% 92%   Weight:       Height:         Medications   diatrizoate meglumine-sodium (GASTROGRAFIN) 66-10 % solution 15 mL (15 mL Oral Given 3/6/21 1216)   ipratropium-albuterol (DUO-NEB) nebulizer solution 3 mL (3 mL Nebulization Given 3/6/21 1257)   iopamidol (ISOVUE-300) 61 % injection 100 mL (85 mL Intravenous Given 3/6/21 1401)     ECG/EMG Results (last 24 hours)     Procedure Component Value Units Date/Time    ECG 12 Lead [[id number]] Collected: 03/06/21 1143     Updated: 03/06/21 1153     QT Interval 348 ms      QTC Interval 504 ms     Narrative:      Test Reason : Upper Abdominal Pain Triage Protocol  Blood Pressure : **/** mmHG  Vent. Rate : 126 BPM     Atrial Rate : 087 BPM     P-R Int : 000 ms          QRS Dur : 122 ms      QT Int : 348 ms       P-R-T Axes : 000 254 036 degrees     QTc Int : 504 ms    sinus rhythm with PACs, some sequential and tachycardic  Right bundle branch block  Abnormal ECG  When compared with ECG of 07-AUG-2020 11:03,  atrial tachycardia  now present  Questionable change in QRS axis  T wave inversion now evident in Anterior leads  Confirmed by RITO GALINDO MD ([id number]) on 3/6/2021 11:53:01 AM    Referred By:  LINSEY           Confirmed By:RITO GALINDO MD        ECG 12 Lead   Final Result   Test Reason : Upper Abdominal Pain Triage Protocol   Blood Pressure : **/** mmHG   Vent. Rate : 126 BPM     Atrial Rate : 087 BPM      P-R Int : 000 ms          QRS Dur : 122 ms       QT Int : 348 ms       P-R-T Axes : 000 254 036 degrees      QTc Int : 504 ms      sinus rhythm with PACs, some sequential and tachycardic   Right bundle branch block   Abnormal ECG   When compared with ECG of 07-AUG-2020 11:03,   atrial tachycardia now present   Questionable change in QRS axis   T wave inversion now evident in Anterior leads   Confirmed by RITO GALINDO MD ([id number]) on 3/6/2021 11:53:01 AM      Referred By:  LINSEY           Confirmed By:RITO GALINDO MD                                               [facility]    Final diagnoses:   Metastatic malignant neoplasm, unspecified site (CMS/HCC)   Atrial tachycardia (CMS/HCC)   Weight loss   Anorexia            Rito Galindo MD  03/06/21 7305

## 2021-03-06 NOTE — DISCHARGE INSTRUCTIONS
If you have not heard from Dr. Grigsby' office by early afternoon Monday, 3/8, call them about getting a liver biopsy scheduled.  Continue your regular medications.    Please review the medications you are supposed to be taking according to prior physician recommendations. I have not changed your home medications during this visit. If your discharge instructions indicate that I have changed your home medications, this is not the case, and you should disregard. If you have any questions about the medication you should be taking at home, please call your physician.

## 2021-03-09 NOTE — TELEPHONE ENCOUNTER
Spoke with Aislinn PASCUAL with Dr. Grigsby office. Patient was in ED on 3/6/2021. CT scans show metastatic disease. He is scheduled for liver biopsy on 3/12/2021. Patient is in agreement to go ahead with liver biopsy and will follow up next week with Dr. Sykes to discuss treatment options. Patient and his wife are in agreement to plan.

## 2021-03-10 NOTE — TELEPHONE ENCOUNTER
The patient and his wife called because he would like to cancel the liver biopsy at this time. Confirmed I understood and would notify Central scheduling as well as .     Patient is still scheduled for a video visit with  on 03/16/2021.

## 2021-03-16 NOTE — PROGRESS NOTES
GASTROENTEROLOGY OFFICE NOTE  Francesco Arroyo  [id number]  1945       CARE TEAM  Patient Care Team:  Alex Velez,  as PCP - General (Family Medicine)     You have chosen to receive care through a telephone visit. Do you consent to use a telephone visit for your medical care today? Yes      No ref. provider found     Chief Complaint   Patient presents with   • Follow-up     from [facility]         HISTORY OF PRESENT ILLNESS:  Patient presents for follow-up after recent emergency department evaluation resulted in CT of chest and abdomen indicative of pulmonary malignancy and metastatic disease to the liver.  Percutaneous liver biopsy was recommended but patient declined as both he and his wife feel he is not up for any aggressive therapy.    From a GI standpoint he is appetite is poor he has some vague intermittent abdominal pain but he denies dysphagia, odynophagia early satiety melena or bright red blood per rectum.    PAST MEDICAL HISTORY  Past Medical History:   Diagnosis Date   • Acid reflux    • Colon polyp    • Diabetes mellitus (CMS/HCC)    • Diabetes mellitus type 2 in nonobese (CMS/HCC)    • Esophageal cancer (CMS/HCC) 11/28/2016    2008 SURGERY   • Hyperlipidemia         PAST SURGICAL HISTORY  Past Surgical History:   Procedure Laterality Date   • CARDIAC CATHETERIZATION N/A 5/2/2019    Procedure: Left Heart Cath;  Surgeon: Jayme Castano MD;  Location:  [facility] CATH INVASIVE LOCATION;  Service: Cardiovascular   • CARDIAC CATHETERIZATION N/A 11/26/2019    Procedure: Left Heart Cath;  Surgeon: Jayme Castano MD;  Location:  [facility] CATH INVASIVE LOCATION;  Service: Cardiovascular   • ESOPHAGUS SURGERY     • OTHER SURGICAL HISTORY  03/31/2008    Resection of esophageal cancer   • PERICARDIAL WINDOW N/A 5/6/2019    Procedure: PERICARDIAL WINDOW, THORACOTOMY APPROACH LEFT;  Surgeon: Zaid Pearson MD;  Location:  [facility] OR;  Service: Cardiothoracic        MEDICATIONS:    Current  Outpatient Medications:   •  albuterol (PROVENTIL) (2.5 MG/3ML) 0.083% nebulizer solution, USE CONTENTS OF 1 VIAL VIA NEBULIZER EVERY 6 HOURS, Disp: , Rfl: 0  •  amitriptyline (Elavil) 25 MG tablet, Take 3 tabs at bedtime for one week then increase to 4 tabs at bedtime, Disp: 120 tablet, Rfl: 2  •  aspirin 81 MG EC tablet, Take 81 mg by mouth Daily., Disp: , Rfl:   •  atorvastatin (LIPITOR) 10 MG tablet, Take 1 tablet by mouth Every Night., Disp: 90 tablet, Rfl: 3  •  ENTRESTO 24-26 MG tablet, TAKE 1 TABLET BY MOUTH TWICE A DAY, Disp: 60 tablet, Rfl: 11  •  esomeprazole (nexIUM) 40 MG capsule, TAKE ONE CAPSULE BY MOUTH EVERY DAY, Disp: , Rfl: 3  •  furosemide (LASIX) 40 MG tablet, Take 40 mg by mouth Daily. 0.5 TAB DAILY PRN, Disp: , Rfl:   •  glyBURIDE-metFORMIN (GLUCOVANCE) 5-500 MG per tablet, Take 1 tablet by mouth Every 12 (Twelve) Hours., Disp: , Rfl:   •  HYDROcodone-acetaminophen (NORCO) 5-325 MG per tablet, Take 1 tablet by mouth Every 8 (Eight) Hours As Needed., Disp: , Rfl:   •  megestrol (MEGACE) 40 MG/ML suspension, Take 5 mL by mouth 3 (Three) Times a Day With Meals. (Patient taking differently: Take 200 mg by mouth 3 (Three) Times a Day With Meals. Patient does not always take it three times a day but takes it at least once daily), Disp: 480 mL, Rfl: 5  •  metFORMIN ER (GLUCOPHAGE-XR) 500 MG 24 hr tablet, Take 500 mg by mouth Every 12 (Twelve) Hours., Disp: , Rfl: 1  •  metoprolol succinate XL (TOPROL-XL) 25 MG 24 hr tablet, Take 1 tablet by mouth Daily., Disp: 30 tablet, Rfl: 3  •  nystatin susp + lidocaine viscous (MAGIC MOUTHWASH) oral suspension, 5-10 ml swish and spit or swallow QID prn, Disp: 240 mL, Rfl: 9    ALLERGIES  No Known Allergies    FAMILY HISTORY:  Family History   Problem Relation Age of Onset   • Stroke Mother    • Cancer Father    • Colon cancer Neg Hx    • Colon polyps Neg Hx        SOCIAL HISTORY  Social History     Socioeconomic History   • Marital status:      Spouse  name: Not on file   • Number of children: 1   • Years of education: Not on file   • Highest education level: Not on file   Tobacco Use   • Smoking status: Current Every Day Smoker     Packs/day: 1.00     Years: 60.00     Pack years: 60.00     Types: Cigarettes   • Smokeless tobacco: Never Used   Vaping Use   • Vaping Use: Never used   Substance and Sexual Activity   • Alcohol use: No   • Drug use: No   • Sexual activity: Defer     Socioeconomic History:  .  Lives at home with his wife.  Smokes a pack of cigarettes per day and does not abuse alcohol.       REVIEW OF SYSTEMS  Review of Systems   Constitutional: Positive for appetite change. Negative for activity change, chills, diaphoresis, fatigue, fever, unexpected weight gain and unexpected weight loss.   HENT: Negative for trouble swallowing and voice change.    Gastrointestinal: Positive for abdominal distention and diarrhea. Negative for abdominal pain, anal bleeding, blood in stool, constipation, nausea, rectal pain, vomiting, GERD and indigestion.     I reviewed the above-noted review of systems.    PHYSICAL EXAM   General: Pleasant male in no apparent or acute distress  HEENT: Normal speech  Lungs: Grossly normal respirations without labored breathing or audible wheezing.  Speaking in full sentences  Neurologic: Normal cognition.  Normal affect.  Alert and oriented.  Normal speech.    Results for orders placed or performed during the hospital encounter of 03/06/21   Comprehensive Metabolic Panel    Specimen: Blood   Result Value Ref Range    Glucose 173 (H) 65 - 99 mg/dL    BUN 12 8 - 23 mg/dL    Creatinine 1.14 0.76 - 1.27 mg/dL    Sodium 137 136 - 145 mmol/L    Potassium 3.4 (L) 3.5 - 5.2 mmol/L    Chloride 96 (L) 98 - 107 mmol/L    CO2 27.0 22.0 - 29.0 mmol/L    Calcium 10.2 8.6 - 10.5 mg/dL    Total Protein 8.0 6.0 - 8.5 g/dL    Albumin 3.50 3.50 - 5.20 g/dL    ALT (SGPT) 23 1 - 41 U/L    AST (SGOT) 80 (H) 1 - 40 U/L    Alkaline Phosphatase 778 (H)  39 - 117 U/L    Total Bilirubin 0.8 0.0 - 1.2 mg/dL    eGFR Non African Amer 62 >60 mL/min/1.73    Globulin 4.5 gm/dL    A/G Ratio 0.8 g/dL    BUN/Creatinine Ratio 10.5 7.0 - 25.0    Anion Gap 14.0 5.0 - 15.0 mmol/L   Lipase    Specimen: Blood   Result Value Ref Range    Lipase 15 13 - 60 U/L   Troponin    Specimen: Blood   Result Value Ref Range    Troponin T <0.010 0.000 - 0.030 ng/mL   Urinalysis With Microscopic If Indicated (No Culture) - Urine, Clean Catch    Specimen: Urine, Clean Catch   Result Value Ref Range    Color, UA Yellow Yellow, Straw    Appearance, UA Clear Clear    pH, UA 6.5 5.0 - 8.0    Specific Gravity, UA 1.008 1.001 - 1.030    Glucose, UA Negative Negative    Ketones, UA Negative Negative    Bilirubin, UA Negative Negative    Blood, UA Trace (A) Negative    Protein, UA Negative Negative    Leuk Esterase, UA Small (1+) (A) Negative    Nitrite, UA Negative Negative    Urobilinogen, UA 0.2 E.U./dL 0.2 - 1.0 E.U./dL   Gray Top - Ice   Result Value Ref Range    Extra Tube Hold for add-ons.    CBC Auto Differential    Specimen: Blood   Result Value Ref Range    WBC 10.19 3.40 - 10.80 10*3/mm3    RBC 5.53 4.14 - 5.80 10*6/mm3    Hemoglobin 14.0 13.0 - 17.7 g/dL    Hematocrit 45.5 37.5 - 51.0 %    MCV 82.3 79.0 - 97.0 fL    MCH 25.3 (L) 26.6 - 33.0 pg    MCHC 30.8 (L) 31.5 - 35.7 g/dL    RDW 21.2 (H) 12.3 - 15.4 %    RDW-SD 60.0 (H) 37.0 - 54.0 fl    MPV 9.3 6.0 - 12.0 fL    Platelets 441 140 - 450 10*3/mm3    Neutrophil % 83.8 (H) 42.7 - 76.0 %    Lymphocyte % 7.2 (L) 19.6 - 45.3 %    Monocyte % 8.1 5.0 - 12.0 %    Eosinophil % 0.1 (L) 0.3 - 6.2 %    Basophil % 0.3 0.0 - 1.5 %    Immature Grans % 0.5 0.0 - 0.5 %    Neutrophils, Absolute 8.54 (H) 1.70 - 7.00 10*3/mm3    Lymphocytes, Absolute 0.73 0.70 - 3.10 10*3/mm3    Monocytes, Absolute 0.83 0.10 - 0.90 10*3/mm3    Eosinophils, Absolute 0.01 0.00 - 0.40 10*3/mm3    Basophils, Absolute 0.03 0.00 - 0.20 10*3/mm3    Immature Grans, Absolute 0.05 0.00  - 0.05 10*3/mm3    nRBC 0.0 0.0 - 0.2 /100 WBC   Scan Slide    Specimen: Blood   Result Value Ref Range    Microcytes Slight/1+ None Seen    WBC Morphology Normal Normal    Platelet Morphology Normal Normal   Urinalysis, Microscopic Only - Urine, Clean Catch    Specimen: Urine, Clean Catch   Result Value Ref Range    RBC, UA 3-6 (A) None Seen, 0-2 /HPF    WBC, UA 6-12 (A) None Seen, 0-2 /HPF    Bacteria, UA None Seen None Seen, Trace /HPF    Squamous Epithelial Cells, UA None Seen None Seen, 0-2 /HPF    Hyaline Casts, UA 0-6 0 - 6 /LPF    Methodology Automated Microscopy    Protime-INR    Specimen: Blood   Result Value Ref Range    Protime 15.5 (H) 11.5 - 14.0 Seconds    INR 1.26 (H) 0.85 - 1.16   aPTT    Specimen: Blood   Result Value Ref Range    PTT 35.0 24.0 - 37.0 seconds   ECG 12 Lead   Result Value Ref Range    QT Interval 348 ms    QTC Interval 504 ms   Light Blue Top   Result Value Ref Range    Extra Tube hold for add-on    Green Top (Gel)   Result Value Ref Range    Extra Tube Hold for add-ons.    Lavender Top   Result Value Ref Range    Extra Tube hold for add-on    Gold Top - SST   Result Value Ref Range    Extra Tube Hold for add-ons.         Results Review:  I reviewed the patient's new clinical results.      ASSESSMENT  1.-Suspected pulmonary malignancy  2.-Suspected metastatic disease to liver  3-history of esophageal cancer status post recent EGD without evidence of recurrent malignancy  4.-Status post colonoscopy August 2020.  Negative for malignancy    PLAN  1.-Patient will discuss with oncology (Dr. Sykes) further work-up or evaluation options.  No specific GI recommendations at this time.        Wily Grigsby MD  3/16/2021   13:48 EDT

## 2021-03-22 NOTE — PROGRESS NOTES
PROBLEM LIST:  1. Adenocarcinoma of the distal esophagus.   a) Clinical stage [id number], before neoadjuvant therapy.   b) Neoadjuvant chemoradiation.   c) Resection with stage [id number], stage Hebrew, resection 03/31/2008        REASON FOR VISIT: Metastatic cancer    HISTORY OF PRESENT ILLNESS:   75 y.o.  male presents today for discussion of his recent finding of a metastatic cancer.  Patient has had significant decline over the last several months.  He has significant anorexia.  He has shortness of breath and his performance status is declined.  He also has weight loss.    Past medical history, social history and family history was reviewed and unchanged from prior visit.    Review of Systems:    Review of Systems - Oncology         Medications:        Current Outpatient Medications:   •  albuterol (PROVENTIL) (2.5 MG/3ML) 0.083% nebulizer solution, USE CONTENTS OF 1 VIAL VIA NEBULIZER EVERY 6 HOURS, Disp: , Rfl: 0  •  amitriptyline (Elavil) 25 MG tablet, Take 3 tabs at bedtime for one week then increase to 4 tabs at bedtime, Disp: 120 tablet, Rfl: 2  •  aspirin 81 MG EC tablet, Take 81 mg by mouth Daily., Disp: , Rfl:   •  atorvastatin (LIPITOR) 10 MG tablet, Take 1 tablet by mouth Every Night., Disp: 90 tablet, Rfl: 3  •  ENTRESTO 24-26 MG tablet, TAKE 1 TABLET BY MOUTH TWICE A DAY, Disp: 60 tablet, Rfl: 11  •  esomeprazole (nexIUM) 40 MG capsule, TAKE ONE CAPSULE BY MOUTH EVERY DAY, Disp: , Rfl: 3  •  furosemide (LASIX) 40 MG tablet, Take 40 mg by mouth Daily. 0.5 TAB DAILY PRN, Disp: , Rfl:   •  glyBURIDE-metFORMIN (GLUCOVANCE) 5-500 MG per tablet, Take 1 tablet by mouth Every 12 (Twelve) Hours., Disp: , Rfl:   •  HYDROcodone-acetaminophen (NORCO) 5-325 MG per tablet, Take 1 tablet by mouth Every 8 (Eight) Hours As Needed., Disp: , Rfl:   •  megestrol (MEGACE) 40 MG/ML suspension, Take 5 mL by mouth 3 (Three) Times a Day With Meals. (Patient taking differently: Take 200 mg by mouth 3 (Three) Times a Day With Meals.  "Patient does not always take it three times a day but takes it at least once daily), Disp: 480 mL, Rfl: 5  •  metFORMIN ER (GLUCOPHAGE-XR) 500 MG 24 hr tablet, Take 500 mg by mouth Every 12 (Twelve) Hours., Disp: , Rfl: 1  •  metoprolol succinate XL (TOPROL-XL) 25 MG 24 hr tablet, Take 1 tablet by mouth Daily., Disp: 30 tablet, Rfl: 3  •  nystatin susp + lidocaine viscous (MAGIC MOUTHWASH) oral suspension, 5-10 ml swish and spit or swallow QID prn, Disp: 240 mL, Rfl: 9    Pain Medications             amitriptyline (Elavil) 25 MG tablet Take 3 tabs at bedtime for one week then increase to 4 tabs at bedtime    aspirin 81 MG EC tablet Take 81 mg by mouth Daily.    HYDROcodone-acetaminophen (NORCO) 5-325 MG per tablet Take 1 tablet by mouth Every 8 (Eight) Hours As Needed.             ALLERGIES:  No Known Allergies      Physical Exam    VITAL SIGNS:  /77   Pulse (!) 133   Temp 97.7 °F (36.5 °C) (Temporal)   Resp 24   Ht 170.2 cm (67\")   Wt 63.4 kg (139 lb 12.8 oz)   BMI 21.90 kg/m²                 Wt Readings from Last 3 Encounters:   03/22/21 63.4 kg (139 lb 12.8 oz)   03/06/21 67.1 kg (148 lb)   02/23/21 65 kg (143 lb 3.2 oz)       Body mass index is 21.9 kg/m². Body surface area is 1.74 meters squared.    Pain Score    03/22/21 1505   PainSc:   9   PainLoc: Buttocks         Performance Status: 3    General: Cachectic appearing, in no acute distress  HEENT: sclera anicteric, neck is supple  Lymphatics: no cervical, supraclavicular, or axillary adenopathy  Cardiovascular: regular rate and rhythm, no murmurs, rubs or gallops  Lungs: Decreased breath sounds bilaterally in the bases  Abdomen: soft, nontender, nondistended.  No palpable organomegaly  Extremities: no lower extremity edema  Skin: no rashes, lesions, bruising, or petechiae  Msk: Weak requiring a wheelchair to ambulate  Psych: Mood is stable        RECENT LABS:    Lab Results   Component Value Date    HGB 14.0 03/06/2021    HCT 45.5 03/06/2021    " MCV 82.3 03/06/2021     03/06/2021    WBC 10.19 03/06/2021    NEUTROABS 8.54 (H) 03/06/2021    LYMPHSABS 0.73 03/06/2021    MONOSABS 0.83 03/06/2021    EOSABS 0.01 03/06/2021    BASOSABS 0.03 03/06/2021       Lab Results   Component Value Date    GLUCOSE 173 (H) 03/06/2021    BUN 12 03/06/2021    CREATININE 1.14 03/06/2021     03/06/2021    K 3.4 (L) 03/06/2021    CL 96 (L) 03/06/2021    CO2 27.0 03/06/2021    CALCIUM 10.2 03/06/2021    PROTEINTOT 8.0 03/06/2021    ALBUMIN 3.50 03/06/2021    BILITOT 0.8 03/06/2021    ALKPHOS 778 (H) 03/06/2021    AST 80 (H) 03/06/2021    ALT 23 03/06/2021       CT Chest With Contrast Diagnostic    Result Date: 3/8/2021  Development of metastatic disease within the mediastinum and right hilar region as well as pulmonary mass seen in the right lower lobe and 2 pleural-based nodule seen anteriorly within the upper lobes. There is diffuse hepatic metastatic disease identified. Remainder of the chest, abdomen and pelvis reveals no acute abnormality.  DICTATED:   03/06/2021 EDITED/ls :   03/06/2021   This report was finalized on 3/8/2021 10:34 AM by Dr. Helena Ortega MD.      CT Abdomen Pelvis With Contrast    Result Date: 3/8/2021  Development of metastatic disease within the mediastinum and right hilar region as well as pulmonary mass seen in the right lower lobe and 2 pleural-based nodule seen anteriorly within the upper lobes. There is diffuse hepatic metastatic disease identified. Remainder of the chest, abdomen and pelvis reveals no acute abnormality.  DICTATED:   03/06/2021 EDITED/ls :   03/06/2021   This report was finalized on 3/8/2021 10:34 AM by Dr. Helena Ortega MD.      XR Chest 1 View    Result Date: 3/8/2021  Worsening of the aeration at the lung bases with increased markings in the lung bases bilaterally and development of a small left pleural effusion.  DICTATED:   03/06/2021 EDITED/ls :   03/06/2021  This report was finalized on 3/8/2021 10:33 AM  by Dr. Helena Ortega MD.            Assessment/Plan    1.  Widely metastatic disease likely stage IV cancer of GI primary.  He has had a considerable decline in his performance status and the patient is not interested in chemotherapy.  At this time I feel that we need to avoid any further testing or imaging.  I will cancel his biopsy.  I have recommended hospice care and the patient will be enrolled in hospice at home.  They are very comfortable with this decision.  At this point palliative care is what we really need to focus on.  We reviewed the images together and I answered all the questions that he had for me today.    Total time of patient care on day of service including time prior to, face to face with patient, and following visit spent in reviewing records, lab results, imaging studies, discussion with patient, and documentation/charting was > 25 minutes.        Greg Sykes MD  [facility]         No orders of the defined types were placed in this encounter.      3/22/2021

## (undated) DEVICE — GLIDESHEATH BASIC HYDROPHILIC COATED INTRODUCER SHEATH: Brand: GLIDESHEATH

## (undated) DEVICE — SUT VIC 2 TP1 54IN J880T

## (undated) DEVICE — SUT SILK 0 SH 30IN K834H

## (undated) DEVICE — CATH DIAG EXPO M/ PK 6FR FL4/FR4 PIG 3PK

## (undated) DEVICE — GUIDE CATHETER: Brand: MACH1™

## (undated) DEVICE — CVR HNDL LT SURG ACCSSRY BLU STRL

## (undated) DEVICE — DEV INFL MONARCH 25W

## (undated) DEVICE — MODEL BT2000 P/N 700287-012KIT CONTENTS: MANIFOLD WITH SALINE AND CONTRAST PORTS, SALINE TUBING WITH SPIKE AND HAND SYRINGE, TRANSDUCER: Brand: BT2000 AUTOMATED MANIFOLD KIT

## (undated) DEVICE — CATH DIAG EXPO .056 FL3.5 6F 100CM

## (undated) DEVICE — Device

## (undated) DEVICE — HI-TORQUE WHISPER MS GUIDE WIRE .014 STRAIGHT TIP 3.0 CM X 190 CM: Brand: HI-TORQUE WHISPER

## (undated) DEVICE — UNDYED BRAIDED (POLYGLACTIN 910), SYNTHETIC ABSORBABLE SUTURE: Brand: COATED VICRYL

## (undated) DEVICE — TRAP,MUCUS SPECIMEN,40CC: Brand: MEDLINE

## (undated) DEVICE — TREK CORONARY DILATATION CATHETER 3.0 MM X 15 MM / RAPID-EXCHANGE: Brand: TREK

## (undated) DEVICE — PK CATH CARD 10

## (undated) DEVICE — DRSNG SURESITE WNDW 4X4.5

## (undated) DEVICE — NC TREK CORONARY DILATATION CATHETER 3.5 MM X 20 MM / RAPID-EXCHANGE: Brand: NC TREK

## (undated) DEVICE — TP MICROFM 3IN LF

## (undated) DEVICE — MODEL AT P65, P/N 701554-001KIT CONTENTS: HAND CONTROLLER, 3-WAY HIGH-PRESSURE STOPCOCK WITH ROTATING END AND PREMIUM HIGH-PRESSURE TUBING: Brand: ANGIOTOUCH® KIT

## (undated) DEVICE — GLV SURG SENSICARE MICRO PF LF 8 STRL

## (undated) DEVICE — DEV COMP RAD PRELUDESYNC 24CM

## (undated) DEVICE — SKIN AFFIX SURG ADHESIVE 72/CS 0.55ML: Brand: MEDLINE

## (undated) DEVICE — SUT SILK 2/0 TIES 18IN A185H

## (undated) DEVICE — ENCORE® LATEX MICRO SIZE 7.5, STERILE LATEX POWDER-FREE SURGICAL GLOVE: Brand: ENCORE

## (undated) DEVICE — SUT MNCRYL 3/0 SH 27 IN Y416H

## (undated) DEVICE — SPNG GZ WOVN 4X4IN 12PLY 10/BX STRL

## (undated) DEVICE — ST EXT IV SMARTSITE 2VLV SP M LL 5ML IV1

## (undated) DEVICE — TREK CORONARY DILATATION CATHETER 2.50 MM X 15 MM / RAPID-EXCHANGE: Brand: TREK

## (undated) DEVICE — ST INF PRI SMRTSTE 20DRP 2VLV 24ML 117

## (undated) DEVICE — GW PRESS VERRATA STR 185CM 10185P

## (undated) DEVICE — PK THORACOTOMY 10

## (undated) DEVICE — 3M™ IOBAN™ 2 ANTIMICROBIAL INCISE DRAPE 6650EZ: Brand: IOBAN™ 2

## (undated) DEVICE — SUT SILK 3/0 TIES 18IN A184H

## (undated) DEVICE — COVADERM PLUS: Brand: DEROYAL